# Patient Record
Sex: FEMALE | Race: WHITE
[De-identification: names, ages, dates, MRNs, and addresses within clinical notes are randomized per-mention and may not be internally consistent; named-entity substitution may affect disease eponyms.]

---

## 2017-10-20 ENCOUNTER — CHARTING TRANS (OUTPATIENT)
Dept: OTHER | Age: 47
End: 2017-10-20

## 2018-02-16 ENCOUNTER — LAB ENCOUNTER (OUTPATIENT)
Dept: LAB | Age: 48
End: 2018-02-16
Attending: INTERNAL MEDICINE
Payer: COMMERCIAL

## 2018-02-16 DIAGNOSIS — Z00.00 ROUTINE GENERAL MEDICAL EXAMINATION AT A HEALTH CARE FACILITY: Primary | ICD-10-CM

## 2018-02-16 LAB
25-HYDROXYVITAMIN D (TOTAL): 22.3 NG/ML (ref 30–100)
ALBUMIN SERPL-MCNC: 3.9 G/DL (ref 3.5–4.8)
ALP LIVER SERPL-CCNC: 76 U/L (ref 39–100)
ALT SERPL-CCNC: 27 U/L (ref 14–54)
AST SERPL-CCNC: 15 U/L (ref 15–41)
BASOPHILS # BLD AUTO: 0.03 X10(3) UL (ref 0–0.1)
BASOPHILS NFR BLD AUTO: 0.4 %
BILIRUB SERPL-MCNC: 0.3 MG/DL (ref 0.1–2)
BUN BLD-MCNC: 12 MG/DL (ref 8–20)
CALCIUM BLD-MCNC: 9.8 MG/DL (ref 8.3–10.3)
CHLORIDE: 106 MMOL/L (ref 101–111)
CHOLEST SMN-MCNC: 189 MG/DL (ref ?–200)
CO2: 28 MMOL/L (ref 22–32)
CREAT BLD-MCNC: 0.8 MG/DL (ref 0.55–1.02)
EOSINOPHIL # BLD AUTO: 0.18 X10(3) UL (ref 0–0.3)
EOSINOPHIL NFR BLD AUTO: 2.6 %
ERYTHROCYTE [DISTWIDTH] IN BLOOD BY AUTOMATED COUNT: 11.7 % (ref 11.5–16)
GLUCOSE BLD-MCNC: 73 MG/DL (ref 70–99)
HCT VFR BLD AUTO: 42.2 % (ref 34–50)
HDLC SERPL-MCNC: 46 MG/DL (ref 45–?)
HDLC SERPL: 4.11 {RATIO} (ref ?–4.44)
HGB BLD-MCNC: 13.6 G/DL (ref 12–16)
IMMATURE GRANULOCYTE COUNT: 0.01 X10(3) UL (ref 0–1)
IMMATURE GRANULOCYTE RATIO %: 0.1 %
LDLC SERPL CALC-MCNC: 107 MG/DL (ref ?–130)
LYMPHOCYTES # BLD AUTO: 2.24 X10(3) UL (ref 0.9–4)
LYMPHOCYTES NFR BLD AUTO: 32.7 %
M PROTEIN MFR SERPL ELPH: 8.4 G/DL (ref 6.1–8.3)
MCH RBC QN AUTO: 30 PG (ref 27–33.2)
MCHC RBC AUTO-ENTMCNC: 32.2 G/DL (ref 31–37)
MCV RBC AUTO: 93 FL (ref 81–100)
MONOCYTES # BLD AUTO: 0.63 X10(3) UL (ref 0.1–1)
MONOCYTES NFR BLD AUTO: 9.2 %
NEUTROPHIL ABS PRELIM: 3.76 X10 (3) UL (ref 1.3–6.7)
NEUTROPHILS # BLD AUTO: 3.76 X10(3) UL (ref 1.3–6.7)
NEUTROPHILS NFR BLD AUTO: 55 %
NONHDLC SERPL-MCNC: 143 MG/DL (ref ?–130)
PLATELET # BLD AUTO: 339 10(3)UL (ref 150–450)
POTASSIUM SERPL-SCNC: 5 MMOL/L (ref 3.6–5.1)
RBC # BLD AUTO: 4.54 X10(6)UL (ref 3.8–5.1)
RED CELL DISTRIBUTION WIDTH-SD: 39.9 FL (ref 35.1–46.3)
SODIUM SERPL-SCNC: 139 MMOL/L (ref 136–144)
TRIGL SERPL-MCNC: 178 MG/DL (ref ?–150)
TSI SER-ACNC: 0.65 MIU/ML (ref 0.35–5.5)
VLDLC SERPL CALC-MCNC: 36 MG/DL (ref 5–40)
WBC # BLD AUTO: 6.9 X10(3) UL (ref 4–13)

## 2018-02-16 PROCEDURE — 84443 ASSAY THYROID STIM HORMONE: CPT

## 2018-02-16 PROCEDURE — 82306 VITAMIN D 25 HYDROXY: CPT

## 2018-02-16 PROCEDURE — 80053 COMPREHEN METABOLIC PANEL: CPT

## 2018-02-16 PROCEDURE — 36415 COLL VENOUS BLD VENIPUNCTURE: CPT

## 2018-02-16 PROCEDURE — 85025 COMPLETE CBC W/AUTO DIFF WBC: CPT

## 2018-02-16 PROCEDURE — 80061 LIPID PANEL: CPT

## 2018-02-21 ENCOUNTER — HOSPITAL ENCOUNTER (OUTPATIENT)
Dept: MAMMOGRAPHY | Age: 48
Discharge: HOME | End: 2018-02-21
Attending: INTERNAL MEDICINE
Payer: COMMERCIAL

## 2018-02-21 DIAGNOSIS — R92.2 DENSE BREASTS: ICD-10-CM

## 2018-02-21 DIAGNOSIS — Z12.31 VISIT FOR SCREENING MAMMOGRAM: ICD-10-CM

## 2018-02-21 PROCEDURE — 77067 SCR MAMMO BI INCL CAD: CPT | Performed by: INTERNAL MEDICINE

## 2018-02-21 PROCEDURE — 77063 BREAST TOMOSYNTHESIS BI: CPT | Performed by: INTERNAL MEDICINE

## 2018-10-25 ENCOUNTER — CHARTING TRANS (OUTPATIENT)
Dept: OTHER | Age: 48
End: 2018-10-25

## 2019-02-22 ENCOUNTER — HOSPITAL ENCOUNTER (OUTPATIENT)
Dept: MAMMOGRAPHY | Age: 49
Discharge: HOME | End: 2019-02-22
Attending: INTERNAL MEDICINE
Payer: COMMERCIAL

## 2019-02-22 ENCOUNTER — LAB ENCOUNTER (OUTPATIENT)
Dept: LAB | Age: 49
End: 2019-02-22
Attending: INTERNAL MEDICINE
Payer: COMMERCIAL

## 2019-02-22 DIAGNOSIS — Z00.00 ROUTINE GENERAL MEDICAL EXAMINATION AT A HEALTH CARE FACILITY: Primary | ICD-10-CM

## 2019-02-22 DIAGNOSIS — Z12.39 ENCOUNTER FOR SCREENING FOR MALIGNANT NEOPLASM OF BREAST: ICD-10-CM

## 2019-02-22 LAB
ALBUMIN SERPL-MCNC: 3.5 G/DL (ref 3.4–5)
ALBUMIN/GLOB SERPL: 0.8 {RATIO} (ref 1–2)
ALP LIVER SERPL-CCNC: 72 U/L (ref 39–100)
ALT SERPL-CCNC: 42 U/L (ref 13–56)
ANION GAP SERPL CALC-SCNC: 11 MMOL/L (ref 0–18)
AST SERPL-CCNC: 33 U/L (ref 15–37)
BILIRUB SERPL-MCNC: 0.4 MG/DL (ref 0.1–2)
BUN BLD-MCNC: 12 MG/DL (ref 7–18)
BUN/CREAT SERPL: 16 (ref 10–20)
CALCIUM BLD-MCNC: 9.1 MG/DL (ref 8.5–10.1)
CHLORIDE SERPL-SCNC: 104 MMOL/L (ref 98–107)
CHOLEST SMN-MCNC: 214 MG/DL (ref ?–200)
CO2 SERPL-SCNC: 23 MMOL/L (ref 21–32)
CREAT BLD-MCNC: 0.75 MG/DL (ref 0.55–1.02)
GLOBULIN PLAS-MCNC: 4.2 G/DL (ref 2.8–4.4)
GLUCOSE BLD-MCNC: 79 MG/DL (ref 70–99)
HDLC SERPL-MCNC: 47 MG/DL (ref 40–59)
LDLC SERPL CALC-MCNC: 133 MG/DL (ref ?–100)
M PROTEIN MFR SERPL ELPH: 7.7 G/DL (ref 6.4–8.2)
NONHDLC SERPL-MCNC: 167 MG/DL (ref ?–130)
OSMOLALITY SERPL CALC.SUM OF ELEC: 285 MOSM/KG (ref 275–295)
POTASSIUM SERPL-SCNC: 4.7 MMOL/L (ref 3.5–5.1)
SODIUM SERPL-SCNC: 138 MMOL/L (ref 136–145)
TRIGL SERPL-MCNC: 172 MG/DL (ref 30–149)
TSI SER-ACNC: 0.55 MIU/ML (ref 0.36–3.74)
VIT D+METAB SERPL-MCNC: 34.9 NG/ML (ref 30–100)
VLDLC SERPL CALC-MCNC: 34 MG/DL (ref 0–30)

## 2019-02-22 PROCEDURE — 85025 COMPLETE CBC W/AUTO DIFF WBC: CPT

## 2019-02-22 PROCEDURE — 80053 COMPREHEN METABOLIC PANEL: CPT

## 2019-02-22 PROCEDURE — 77063 BREAST TOMOSYNTHESIS BI: CPT | Performed by: INTERNAL MEDICINE

## 2019-02-22 PROCEDURE — 84443 ASSAY THYROID STIM HORMONE: CPT

## 2019-02-22 PROCEDURE — 82306 VITAMIN D 25 HYDROXY: CPT

## 2019-02-22 PROCEDURE — 77067 SCR MAMMO BI INCL CAD: CPT | Performed by: INTERNAL MEDICINE

## 2019-02-22 PROCEDURE — 80061 LIPID PANEL: CPT

## 2019-03-01 ENCOUNTER — LAB ENCOUNTER (OUTPATIENT)
Dept: LAB | Age: 49
End: 2019-03-01
Attending: INTERNAL MEDICINE
Payer: COMMERCIAL

## 2019-03-01 DIAGNOSIS — Z00.00 ROUTINE GENERAL MEDICAL EXAMINATION AT A HEALTH CARE FACILITY: ICD-10-CM

## 2019-03-01 LAB
BASOPHILS # BLD AUTO: 0.02 X10(3) UL (ref 0–0.2)
BASOPHILS NFR BLD AUTO: 0.3 %
DEPRECATED RDW RBC AUTO: 40.6 FL (ref 35.1–46.3)
EOSINOPHIL # BLD AUTO: 0.17 X10(3) UL (ref 0–0.7)
EOSINOPHIL NFR BLD AUTO: 2.4 %
ERYTHROCYTE [DISTWIDTH] IN BLOOD BY AUTOMATED COUNT: 11.9 % (ref 11–15)
HCT VFR BLD AUTO: 40.7 % (ref 35–48)
HGB BLD-MCNC: 13.2 G/DL (ref 12–16)
IMM GRANULOCYTES # BLD AUTO: 0.03 X10(3) UL (ref 0–1)
IMM GRANULOCYTES NFR BLD: 0.4 %
LYMPHOCYTES # BLD AUTO: 1.72 X10(3) UL (ref 1–4)
LYMPHOCYTES NFR BLD AUTO: 24.1 %
MCH RBC QN AUTO: 30.3 PG (ref 26–34)
MCHC RBC AUTO-ENTMCNC: 32.4 G/DL (ref 31–37)
MCV RBC AUTO: 93.3 FL (ref 80–100)
MONOCYTES # BLD AUTO: 0.67 X10(3) UL (ref 0.1–1)
MONOCYTES NFR BLD AUTO: 9.4 %
NEUTROPHILS # BLD AUTO: 4.53 X10 (3) UL (ref 1.5–7.7)
NEUTROPHILS # BLD AUTO: 4.53 X10(3) UL (ref 1.5–7.7)
NEUTROPHILS NFR BLD AUTO: 63.4 %
PLATELET # BLD AUTO: 343 10(3)UL (ref 150–450)
RBC # BLD AUTO: 4.36 X10(6)UL (ref 3.8–5.3)
WBC # BLD AUTO: 7.1 X10(3) UL (ref 4–11)

## 2019-03-01 PROCEDURE — 36415 COLL VENOUS BLD VENIPUNCTURE: CPT

## 2019-03-01 PROCEDURE — 85025 COMPLETE CBC W/AUTO DIFF WBC: CPT

## 2019-10-18 ENCOUNTER — WALK IN (OUTPATIENT)
Dept: URGENT CARE | Age: 49
End: 2019-10-18

## 2019-10-18 DIAGNOSIS — Z23 NEED FOR IMMUNIZATION AGAINST INFLUENZA: Primary | ICD-10-CM

## 2019-10-18 PROCEDURE — 90686 IIV4 VACC NO PRSV 0.5 ML IM: CPT | Performed by: NURSE PRACTITIONER

## 2019-10-18 PROCEDURE — 90471 IMMUNIZATION ADMIN: CPT | Performed by: NURSE PRACTITIONER

## 2020-02-26 ENCOUNTER — HOSPITAL ENCOUNTER (OUTPATIENT)
Dept: MAMMOGRAPHY | Age: 50
Discharge: HOME | End: 2020-02-26
Payer: COMMERCIAL

## 2020-02-26 DIAGNOSIS — Z12.31 SCREENING MAMMOGRAM, ENCOUNTER FOR: ICD-10-CM

## 2020-02-26 DIAGNOSIS — Z12.39 SCREENING FOR BREAST CANCER: ICD-10-CM

## 2020-02-26 PROCEDURE — 77063 BREAST TOMOSYNTHESIS BI: CPT | Performed by: INTERNAL MEDICINE

## 2020-02-26 PROCEDURE — 77067 SCR MAMMO BI INCL CAD: CPT | Performed by: INTERNAL MEDICINE

## 2020-10-09 ENCOUNTER — IMMUNIZATION (OUTPATIENT)
Dept: URGENT CARE | Age: 50
End: 2020-10-09

## 2020-10-09 DIAGNOSIS — Z23 INFLUENZA VACCINE NEEDED: Primary | ICD-10-CM

## 2020-10-09 PROCEDURE — 90686 IIV4 VACC NO PRSV 0.5 ML IM: CPT | Performed by: OBSTETRICS & GYNECOLOGY

## 2020-10-09 PROCEDURE — 90471 IMMUNIZATION ADMIN: CPT | Performed by: OBSTETRICS & GYNECOLOGY

## 2021-02-01 ENCOUNTER — MED REC SCAN ONLY (OUTPATIENT)
Dept: INTERNAL MEDICINE CLINIC | Facility: CLINIC | Age: 51
End: 2021-02-01

## 2021-02-23 ENCOUNTER — OFFICE VISIT (OUTPATIENT)
Dept: INTERNAL MEDICINE CLINIC | Facility: CLINIC | Age: 51
End: 2021-02-23
Payer: COMMERCIAL

## 2021-02-23 VITALS
HEART RATE: 97 BPM | HEIGHT: 65.5 IN | WEIGHT: 199.38 LBS | RESPIRATION RATE: 16 BRPM | TEMPERATURE: 98 F | SYSTOLIC BLOOD PRESSURE: 140 MMHG | OXYGEN SATURATION: 99 % | DIASTOLIC BLOOD PRESSURE: 80 MMHG | BODY MASS INDEX: 32.82 KG/M2

## 2021-02-23 DIAGNOSIS — Z00.00 LABORATORY EXAM ORDERED AS PART OF ROUTINE GENERAL MEDICAL EXAMINATION: ICD-10-CM

## 2021-02-23 DIAGNOSIS — E55.9 HYPOVITAMINOSIS D: ICD-10-CM

## 2021-02-23 DIAGNOSIS — Z86.018 HISTORY OF UTERINE FIBROID: ICD-10-CM

## 2021-02-23 DIAGNOSIS — Z12.31 ENCOUNTER FOR SCREENING MAMMOGRAM FOR BREAST CANCER: ICD-10-CM

## 2021-02-23 DIAGNOSIS — E61.1 IRON DEFICIENCY: ICD-10-CM

## 2021-02-23 DIAGNOSIS — Z00.00 ENCOUNTER FOR ROUTINE ADULT MEDICAL EXAMINATION: Primary | ICD-10-CM

## 2021-02-23 PROCEDURE — 3079F DIAST BP 80-89 MM HG: CPT | Performed by: INTERNAL MEDICINE

## 2021-02-23 PROCEDURE — 99386 PREV VISIT NEW AGE 40-64: CPT | Performed by: INTERNAL MEDICINE

## 2021-02-23 PROCEDURE — 3008F BODY MASS INDEX DOCD: CPT | Performed by: INTERNAL MEDICINE

## 2021-02-23 PROCEDURE — 3077F SYST BP >= 140 MM HG: CPT | Performed by: INTERNAL MEDICINE

## 2021-02-23 RX ORDER — MULTIVIT-MIN/IRON/FOLIC ACID/K 18-600-40
1 CAPSULE ORAL DAILY
COMMUNITY

## 2021-02-23 RX ORDER — NYSTATIN 100000 [USP'U]/G
1 POWDER TOPICAL
COMMUNITY
Start: 2020-11-20

## 2021-02-23 RX ORDER — D-METHORPHAN/PE/ACETAMINOPHEN 10-5-325MG
1 CAPSULE ORAL 2 TIMES DAILY
COMMUNITY

## 2021-02-23 RX ORDER — PHENOL 1.4 %
1 AEROSOL, SPRAY (ML) MUCOUS MEMBRANE NIGHTLY
COMMUNITY

## 2021-02-23 NOTE — PATIENT INSTRUCTIONS
Call to schedule appointment for blood work and mammogram    Call to schedule appointment with gynecology

## 2021-02-23 NOTE — PROGRESS NOTES
[facility] Office Note  Chief Complaint:   Patient presents with:  New Patient  CPX      HPI:   This is a 53year old female coming in for establishing care  HPI    She notes she had a lot of issues around menopause.  She had irr • Nystatin 047065 UNIT/GM External Powder Apply 1 Application topically. • hydrocortisone 2.5 % External Cream Use on AA QD-BID prn 30 g 2   • ketoconazole 2 % External Cream Apply to AA QD-BID prn.  60 g 2   • Calcium Carbonate-Vit D-Min (CALCIUM 120 and affect.        ASSESSMENT AND PLAN:   Marlen Arroyo is a 53year old female with  Encounter for routine adult medical examination  (primary encounter diagnosis)  History of uterine fibroid  Laboratory exam ordered as part of routine general medical exam George Fulton MD

## 2021-03-02 ENCOUNTER — LAB ENCOUNTER (OUTPATIENT)
Dept: LAB | Age: 51
End: 2021-03-02
Attending: INTERNAL MEDICINE
Payer: COMMERCIAL

## 2021-03-02 LAB
ALBUMIN SERPL-MCNC: 3.7 G/DL (ref 3.4–5)
ALBUMIN/GLOB SERPL: 0.9 {RATIO} (ref 1–2)
ALP LIVER SERPL-CCNC: 90 U/L
ALT SERPL-CCNC: 31 U/L
ANION GAP SERPL CALC-SCNC: 5 MMOL/L (ref 0–18)
AST SERPL-CCNC: 12 U/L (ref 15–37)
BASOPHILS # BLD AUTO: 0.05 X10(3) UL (ref 0–0.2)
BASOPHILS NFR BLD AUTO: 0.6 %
BILIRUB SERPL-MCNC: 0.3 MG/DL (ref 0.1–2)
BUN BLD-MCNC: 11 MG/DL (ref 7–18)
BUN/CREAT SERPL: 14.9 (ref 10–20)
CALCIUM BLD-MCNC: 9 MG/DL (ref 8.5–10.1)
CHLORIDE SERPL-SCNC: 106 MMOL/L (ref 98–112)
CHOLEST SMN-MCNC: 234 MG/DL (ref ?–200)
CO2 SERPL-SCNC: 28 MMOL/L (ref 21–32)
CREAT BLD-MCNC: 0.74 MG/DL
DEPRECATED HBV CORE AB SER IA-ACNC: 31.2 NG/ML
DEPRECATED RDW RBC AUTO: 40.9 FL (ref 35.1–46.3)
EOSINOPHIL # BLD AUTO: 0.18 X10(3) UL (ref 0–0.7)
EOSINOPHIL NFR BLD AUTO: 2.3 %
ERYTHROCYTE [DISTWIDTH] IN BLOOD BY AUTOMATED COUNT: 12 % (ref 11–15)
GLOBULIN PLAS-MCNC: 4 G/DL (ref 2.8–4.4)
GLUCOSE BLD-MCNC: 85 MG/DL (ref 70–99)
HCT VFR BLD AUTO: 43.4 %
HDLC SERPL-MCNC: 48 MG/DL (ref 40–59)
HGB BLD-MCNC: 13.9 G/DL
IMM GRANULOCYTES # BLD AUTO: 0.02 X10(3) UL (ref 0–1)
IMM GRANULOCYTES NFR BLD: 0.3 %
IRON SATURATION: 17 %
IRON SERPL-MCNC: 80 UG/DL
LDLC SERPL CALC-MCNC: 139 MG/DL (ref ?–100)
LYMPHOCYTES # BLD AUTO: 1.95 X10(3) UL (ref 1–4)
LYMPHOCYTES NFR BLD AUTO: 24.5 %
M PROTEIN MFR SERPL ELPH: 7.7 G/DL (ref 6.4–8.2)
MCH RBC QN AUTO: 29.6 PG (ref 26–34)
MCHC RBC AUTO-ENTMCNC: 32 G/DL (ref 31–37)
MCV RBC AUTO: 92.5 FL
MONOCYTES # BLD AUTO: 0.87 X10(3) UL (ref 0.1–1)
MONOCYTES NFR BLD AUTO: 10.9 %
NEUTROPHILS # BLD AUTO: 4.89 X10 (3) UL (ref 1.5–7.7)
NEUTROPHILS # BLD AUTO: 4.89 X10(3) UL (ref 1.5–7.7)
NEUTROPHILS NFR BLD AUTO: 61.4 %
NONHDLC SERPL-MCNC: 186 MG/DL (ref ?–130)
OSMOLALITY SERPL CALC.SUM OF ELEC: 287 MOSM/KG (ref 275–295)
PATIENT FASTING Y/N/NP: YES
PATIENT FASTING Y/N/NP: YES
PLATELET # BLD AUTO: 346 10(3)UL (ref 150–450)
POTASSIUM SERPL-SCNC: 4.6 MMOL/L (ref 3.5–5.1)
RBC # BLD AUTO: 4.69 X10(6)UL
SODIUM SERPL-SCNC: 139 MMOL/L (ref 136–145)
TOTAL IRON BINDING CAPACITY: 475 UG/DL (ref 240–450)
TRANSFERRIN SERPL-MCNC: 319 MG/DL (ref 200–360)
TRIGL SERPL-MCNC: 236 MG/DL (ref 30–149)
TSI SER-ACNC: 0.87 MIU/ML (ref 0.36–3.74)
VIT D+METAB SERPL-MCNC: 37.7 NG/ML (ref 30–100)
VLDLC SERPL CALC-MCNC: 47 MG/DL (ref 0–30)
WBC # BLD AUTO: 8 X10(3) UL (ref 4–11)

## 2021-03-02 PROCEDURE — 83540 ASSAY OF IRON: CPT | Performed by: INTERNAL MEDICINE

## 2021-03-02 PROCEDURE — 84443 ASSAY THYROID STIM HORMONE: CPT | Performed by: INTERNAL MEDICINE

## 2021-03-02 PROCEDURE — 36415 COLL VENOUS BLD VENIPUNCTURE: CPT | Performed by: INTERNAL MEDICINE

## 2021-03-02 PROCEDURE — 83550 IRON BINDING TEST: CPT | Performed by: INTERNAL MEDICINE

## 2021-03-02 PROCEDURE — 82728 ASSAY OF FERRITIN: CPT | Performed by: INTERNAL MEDICINE

## 2021-03-02 PROCEDURE — 80061 LIPID PANEL: CPT | Performed by: INTERNAL MEDICINE

## 2021-03-02 PROCEDURE — 80053 COMPREHEN METABOLIC PANEL: CPT | Performed by: INTERNAL MEDICINE

## 2021-03-02 PROCEDURE — 85025 COMPLETE CBC W/AUTO DIFF WBC: CPT | Performed by: INTERNAL MEDICINE

## 2021-03-02 PROCEDURE — 82306 VITAMIN D 25 HYDROXY: CPT | Performed by: INTERNAL MEDICINE

## 2021-03-03 ENCOUNTER — HOSPITAL ENCOUNTER (OUTPATIENT)
Dept: MAMMOGRAPHY | Age: 51
Discharge: HOME | End: 2021-03-03
Attending: INTERNAL MEDICINE
Payer: COMMERCIAL

## 2021-03-03 DIAGNOSIS — Z12.31 ENCOUNTER FOR SCREENING MAMMOGRAM FOR BREAST CANCER: ICD-10-CM

## 2021-03-03 PROCEDURE — 77063 BREAST TOMOSYNTHESIS BI: CPT | Performed by: INTERNAL MEDICINE

## 2021-03-03 PROCEDURE — 77067 SCR MAMMO BI INCL CAD: CPT | Performed by: INTERNAL MEDICINE

## 2021-03-10 ENCOUNTER — TELEPHONE (OUTPATIENT)
Dept: INTERNAL MEDICINE CLINIC | Facility: CLINIC | Age: 51
End: 2021-03-10

## 2021-03-10 NOTE — TELEPHONE ENCOUNTER
Pt thinks she may have pulled her neck muscle about 7 days ago. C/o pain back right side of neck, head sensitivity, sore jaw muscle after eating and throat tightness when swallowing. Difficulty sleeping at night.   Nasal congestion started about 4-5 days

## 2021-03-10 NOTE — TELEPHONE ENCOUNTER
Patient called and stated that she has been experiencing neck pain since last Wednesday. She stated that it hurts to move her neck and swallow. She has been using ice packs and it's not helping. She would like to speak with a nurse. Please advise.

## 2021-03-10 NOTE — TELEPHONE ENCOUNTER
Future Appointments   Date Time Provider Hero Joseph   3/11/2021  1:30 PM Megan Reyes MD [facility] [place]   4/2/2021 11:45 AM Ayesha Wilson MD [facility] OB[facility] P [facility] [address]

## 2021-03-11 ENCOUNTER — TELEMEDICINE (OUTPATIENT)
Dept: INTERNAL MEDICINE CLINIC | Facility: CLINIC | Age: 51
End: 2021-03-11

## 2021-03-11 DIAGNOSIS — B34.9 VIRAL SYNDROME: ICD-10-CM

## 2021-03-11 DIAGNOSIS — M54.2 NECK PAIN: Primary | ICD-10-CM

## 2021-03-11 DIAGNOSIS — M62.838 MUSCLE SPASM: ICD-10-CM

## 2021-03-11 PROCEDURE — 99213 OFFICE O/P EST LOW 20 MIN: CPT | Performed by: INTERNAL MEDICINE

## 2021-03-11 RX ORDER — CYCLOBENZAPRINE HCL 5 MG
5 TABLET ORAL NIGHTLY
Qty: 20 TABLET | Refills: 0 | Status: SHIPPED | OUTPATIENT
Start: 2021-03-11 | End: 2021-08-03

## 2021-03-11 NOTE — PATIENT INSTRUCTIONS
Naproxen 1-2 tablets with breakfast and with dinner.      Cyclobenzaprine 5mg in evening as needed for muscle spasm

## 2021-03-11 NOTE — PROGRESS NOTES
This is a telemedicine visit with live, interactive video and audio. Patient understands and accepts financial responsibility for any deductible, co-insurance and/or co-pays associated with this service.     SUBJECTIVE  She started having a sore neck a D 800 IU          OBJECTIVE  Physical Exam:   No acute distress. Range of motion of neck is limited to the right.  She points out soreness in the right     ASSESSMENT & PLAN  Diagnoses and all orders for this visit:    Neck pain and  Muscle spasm - Naproxen

## 2021-04-08 ENCOUNTER — IMMUNIZATION (OUTPATIENT)
Dept: LAB | Age: 51
End: 2021-04-08
Attending: HOSPITALIST
Payer: COMMERCIAL

## 2021-04-08 DIAGNOSIS — Z23 NEED FOR VACCINATION: Primary | ICD-10-CM

## 2021-04-08 PROCEDURE — 0001A SARSCOV2 VAC 30MCG/0.3ML IM: CPT

## 2021-04-29 ENCOUNTER — IMMUNIZATION (OUTPATIENT)
Dept: LAB | Age: 51
End: 2021-04-29
Attending: HOSPITALIST
Payer: COMMERCIAL

## 2021-04-29 DIAGNOSIS — Z23 NEED FOR VACCINATION: Primary | ICD-10-CM

## 2021-04-29 PROCEDURE — 0002A SARSCOV2 VAC 30MCG/0.3ML IM: CPT

## 2021-08-03 ENCOUNTER — OFFICE VISIT (OUTPATIENT)
Dept: OBGYN CLINIC | Facility: CLINIC | Age: 51
End: 2021-08-03
Payer: COMMERCIAL

## 2021-08-03 VITALS
WEIGHT: 200.38 LBS | DIASTOLIC BLOOD PRESSURE: 76 MMHG | HEIGHT: 65.5 IN | SYSTOLIC BLOOD PRESSURE: 122 MMHG | HEART RATE: 80 BPM | BODY MASS INDEX: 32.98 KG/M2

## 2021-08-03 DIAGNOSIS — B37.2 CANDIDAL INTERTRIGO: ICD-10-CM

## 2021-08-03 DIAGNOSIS — D25.1 INTRAMURAL LEIOMYOMA OF UTERUS: ICD-10-CM

## 2021-08-03 DIAGNOSIS — B37.9 YEAST INFECTION: ICD-10-CM

## 2021-08-03 DIAGNOSIS — N95.1 HOT FLUSHES, PERIMENOPAUSAL: Primary | ICD-10-CM

## 2021-08-03 PROCEDURE — 3074F SYST BP LT 130 MM HG: CPT | Performed by: OBSTETRICS & GYNECOLOGY

## 2021-08-03 PROCEDURE — 3078F DIAST BP <80 MM HG: CPT | Performed by: OBSTETRICS & GYNECOLOGY

## 2021-08-03 PROCEDURE — 3008F BODY MASS INDEX DOCD: CPT | Performed by: OBSTETRICS & GYNECOLOGY

## 2021-08-03 PROCEDURE — 99204 OFFICE O/P NEW MOD 45 MIN: CPT | Performed by: OBSTETRICS & GYNECOLOGY

## 2021-08-03 RX ORDER — NYSTATIN 100000 [USP'U]/G
1 POWDER TOPICAL 3 TIMES DAILY
Qty: 30 G | Refills: 3 | Status: SHIPPED | OUTPATIENT
Start: 2021-08-03

## 2021-08-03 RX ORDER — FLUCONAZOLE 150 MG/1
TABLET ORAL
Qty: 2 TABLET | Refills: 0 | Status: SHIPPED | OUTPATIENT
Start: 2021-08-03

## 2021-08-03 NOTE — PATIENT INSTRUCTIONS
You could try the bonafide Relizen for hot flashes to see if it helps your symptoms  Please notify our office with any prolonged menses > 10 days, very heavy soaking pad per hour, or bleeding in between periods, or severe abdominal pain

## 2021-08-03 NOTE — PROGRESS NOTES
GYN H&P     8/3/2021  11:59 AM    CC: Patient is here for menopausal symptoms, fibroids, vaginal discomfort    HPI: patient is a 53year old  here for her menopausal symptoms, fibroids vaginal discomfort    Pt had AUB 10/2019 with prolonged bleeding 2   SAB TAB Ectopic Multiple Live Births   0 0 0 0 2      # Outcome Date GA Lbr Joshua/2nd Weight Sex Delivery Anes PTL Lv   2 Term 01/12/02 40w0d   F NORMAL SPONT   ESPERANZA   1 Term 10/31/99 40w0d   M NORMAL SPONT   ESPERANZA       GYN hx:    Hx Prior Abnormal Pap: No weight. Abdominal:      Palpations: Abdomen is soft. Tenderness: There is no abdominal tenderness. Genitourinary:     Labia:         Right: Rash present. Left: Rash present.            Comments: Between labia majora and minora there is a wh Suspected yeast infection: also intertrigo noted on inner thighs. Ok to use nystatin powder PRN. For yeast in vulva, recommend diflucan x 2. Rx sent.     Her MMG is UTD 3/2021    F/u in November for repeat US and annual exam    Amber Vargas MD

## 2021-08-05 PROBLEM — D25.1 INTRAMURAL LEIOMYOMA OF UTERUS: Status: ACTIVE | Noted: 2021-08-05

## 2021-08-05 PROBLEM — B37.9 YEAST INFECTION: Status: ACTIVE | Noted: 2021-08-05

## 2021-08-05 PROBLEM — E66.811 CLASS 1 OBESITY DUE TO EXCESS CALORIES WITHOUT SERIOUS COMORBIDITY WITH BODY MASS INDEX (BMI) OF 30.0 TO 30.9 IN ADULT: Status: ACTIVE | Noted: 2019-02-20

## 2021-08-05 PROBLEM — N95.1 HOT FLUSHES, PERIMENOPAUSAL: Status: ACTIVE | Noted: 2019-02-20

## 2021-08-05 PROBLEM — B37.2 CANDIDAL INTERTRIGO: Status: ACTIVE | Noted: 2019-02-20

## 2021-08-05 PROBLEM — E66.09 CLASS 1 OBESITY DUE TO EXCESS CALORIES WITHOUT SERIOUS COMORBIDITY WITH BODY MASS INDEX (BMI) OF 30.0 TO 30.9 IN ADULT: Status: ACTIVE | Noted: 2019-02-20

## 2021-10-12 ENCOUNTER — IMMUNIZATION (OUTPATIENT)
Dept: INTERNAL MEDICINE CLINIC | Facility: CLINIC | Age: 51
End: 2021-10-12
Payer: COMMERCIAL

## 2021-10-12 DIAGNOSIS — Z23 NEED FOR VACCINATION: Primary | ICD-10-CM

## 2021-10-12 PROCEDURE — 90471 IMMUNIZATION ADMIN: CPT | Performed by: INTERNAL MEDICINE

## 2021-10-12 PROCEDURE — 90686 IIV4 VACC NO PRSV 0.5 ML IM: CPT | Performed by: INTERNAL MEDICINE

## 2021-11-19 ENCOUNTER — OFFICE VISIT (OUTPATIENT)
Dept: OBGYN CLINIC | Facility: CLINIC | Age: 51
End: 2021-11-19
Payer: COMMERCIAL

## 2021-11-19 ENCOUNTER — ULTRASOUND ENCOUNTER (OUTPATIENT)
Dept: OBGYN CLINIC | Facility: CLINIC | Age: 51
End: 2021-11-19
Payer: COMMERCIAL

## 2021-11-19 VITALS
BODY MASS INDEX: 32.1 KG/M2 | SYSTOLIC BLOOD PRESSURE: 156 MMHG | WEIGHT: 195 LBS | HEIGHT: 65.5 IN | HEART RATE: 76 BPM | DIASTOLIC BLOOD PRESSURE: 100 MMHG

## 2021-11-19 DIAGNOSIS — Z12.39 ENCOUNTER FOR SCREENING FOR MALIGNANT NEOPLASM OF BREAST, UNSPECIFIED SCREENING MODALITY: ICD-10-CM

## 2021-11-19 DIAGNOSIS — Z01.419 WELL WOMAN EXAM WITH ROUTINE GYNECOLOGICAL EXAM: Primary | ICD-10-CM

## 2021-11-19 DIAGNOSIS — Z12.4 SCREENING FOR MALIGNANT NEOPLASM OF CERVIX: ICD-10-CM

## 2021-11-19 DIAGNOSIS — N81.11 CYSTOCELE, MIDLINE: ICD-10-CM

## 2021-11-19 DIAGNOSIS — N95.1 HOT FLUSHES, PERIMENOPAUSAL: ICD-10-CM

## 2021-11-19 DIAGNOSIS — D25.9 UTERINE LEIOMYOMA, UNSPECIFIED LOCATION: ICD-10-CM

## 2021-11-19 PROBLEM — B37.9 YEAST INFECTION: Status: RESOLVED | Noted: 2021-08-05 | Resolved: 2021-11-19

## 2021-11-19 PROCEDURE — 99396 PREV VISIT EST AGE 40-64: CPT | Performed by: OBSTETRICS & GYNECOLOGY

## 2021-11-19 PROCEDURE — 87624 HPV HI-RISK TYP POOLED RSLT: CPT | Performed by: OBSTETRICS & GYNECOLOGY

## 2021-11-19 PROCEDURE — 3077F SYST BP >= 140 MM HG: CPT | Performed by: OBSTETRICS & GYNECOLOGY

## 2021-11-19 PROCEDURE — 88175 CYTOPATH C/V AUTO FLUID REDO: CPT | Performed by: OBSTETRICS & GYNECOLOGY

## 2021-11-19 PROCEDURE — 76856 US EXAM PELVIC COMPLETE: CPT | Performed by: OBSTETRICS & GYNECOLOGY

## 2021-11-19 PROCEDURE — 3080F DIAST BP >= 90 MM HG: CPT | Performed by: OBSTETRICS & GYNECOLOGY

## 2021-11-19 PROCEDURE — 3008F BODY MASS INDEX DOCD: CPT | Performed by: OBSTETRICS & GYNECOLOGY

## 2021-11-19 PROCEDURE — 76830 TRANSVAGINAL US NON-OB: CPT | Performed by: OBSTETRICS & GYNECOLOGY

## 2021-11-19 NOTE — PROGRESS NOTES
GYN H&P     2021  11:52 AM    CC: Patient is here for annual exam, US review    HPI: patient is a 49year old  here for her annual exam and US review  Since her last visit she has had overall regular menses which have been short and overall li noted on impression)  Intramural fundal: 2.6x2. 4x2.0 cm  Left uterine body: 1.7x1. 7x1.5 cm  Posterior uterine body: 1.8x1. 6x1.5 cm     Also reports vaginal irritation and not feeling as fresh and normal. Unsure if she has an infection or if this is menopau medications on file prior to visit. Family History   Problem Relation Age of Onset   • Cancer Father 76        psa and lungs   • Hypertension Mother              Review of Systems   Constitutional: Positive for unexpected weight change.  Negative for act Speech: Speech normal.        Ivory Gains presents for pelvic US for fibroid surveillance  Uterus: 9x6x4 cm  Endometrium is 7 mm (having menses)  There is one visualized anterior subserosal fibroid noted measuring 1.6x1.7 cm, which when compared to outs

## 2022-02-14 RX ORDER — NYSTATIN 100000 [USP'U]/G
POWDER TOPICAL
Qty: 30 G | Refills: 3 | Status: SHIPPED | OUTPATIENT
Start: 2022-02-14 | End: 2022-02-25

## 2022-02-14 NOTE — TELEPHONE ENCOUNTER
Last OV: 11/19/21  Last refill date: 8/3/21  Follow-up: one year  Next appt.: not scheduled    Called pt and left message on VM. Did pt need this medication? Is pt having symptoms?

## 2022-02-14 NOTE — TELEPHONE ENCOUNTER
Patient states that she takes this daily per Dr Radha Seth. She uses this to prevent.  Please refill

## 2022-02-25 ENCOUNTER — OFFICE VISIT (OUTPATIENT)
Dept: INTERNAL MEDICINE CLINIC | Facility: CLINIC | Age: 52
End: 2022-02-25
Payer: COMMERCIAL

## 2022-02-25 VITALS
HEIGHT: 65 IN | TEMPERATURE: 98 F | OXYGEN SATURATION: 99 % | WEIGHT: 202.38 LBS | HEART RATE: 92 BPM | SYSTOLIC BLOOD PRESSURE: 120 MMHG | RESPIRATION RATE: 16 BRPM | BODY MASS INDEX: 33.72 KG/M2 | DIASTOLIC BLOOD PRESSURE: 70 MMHG

## 2022-02-25 DIAGNOSIS — E66.09 CLASS 1 OBESITY DUE TO EXCESS CALORIES WITH SERIOUS COMORBIDITY AND BODY MASS INDEX (BMI) OF 33.0 TO 33.9 IN ADULT: ICD-10-CM

## 2022-02-25 DIAGNOSIS — Z00.00 ROUTINE GENERAL MEDICAL EXAMINATION AT A HEALTH CARE FACILITY: Primary | ICD-10-CM

## 2022-02-25 PROCEDURE — 90715 TDAP VACCINE 7 YRS/> IM: CPT | Performed by: INTERNAL MEDICINE

## 2022-02-25 PROCEDURE — 90750 HZV VACC RECOMBINANT IM: CPT | Performed by: INTERNAL MEDICINE

## 2022-02-25 PROCEDURE — 3008F BODY MASS INDEX DOCD: CPT | Performed by: INTERNAL MEDICINE

## 2022-02-25 PROCEDURE — 99396 PREV VISIT EST AGE 40-64: CPT | Performed by: INTERNAL MEDICINE

## 2022-02-25 PROCEDURE — 3078F DIAST BP <80 MM HG: CPT | Performed by: INTERNAL MEDICINE

## 2022-02-25 PROCEDURE — 3074F SYST BP LT 130 MM HG: CPT | Performed by: INTERNAL MEDICINE

## 2022-02-25 PROCEDURE — 90472 IMMUNIZATION ADMIN EACH ADD: CPT | Performed by: INTERNAL MEDICINE

## 2022-02-25 PROCEDURE — 90471 IMMUNIZATION ADMIN: CPT | Performed by: INTERNAL MEDICINE

## 2022-02-25 NOTE — PATIENT INSTRUCTIONS
You need 3 eight ounce servings of calcium/vitamin D daily. This includes spinach, kale, broccoli, almonds, milk, yogurt, or cottage cheese. Please complete fasting labs as soon as possible.

## 2022-03-03 ENCOUNTER — LAB ENCOUNTER (OUTPATIENT)
Dept: LAB | Age: 52
End: 2022-03-03
Payer: COMMERCIAL

## 2022-03-03 DIAGNOSIS — Z00.00 ROUTINE GENERAL MEDICAL EXAMINATION AT A HEALTH CARE FACILITY: ICD-10-CM

## 2022-03-03 LAB
ALT SERPL-CCNC: 80 U/L
ANION GAP SERPL CALC-SCNC: 6 MMOL/L (ref 0–18)
AST SERPL-CCNC: 25 U/L (ref 15–37)
BASOPHILS # BLD AUTO: 0.06 X10(3) UL (ref 0–0.2)
BASOPHILS NFR BLD AUTO: 0.7 %
BUN BLD-MCNC: 10 MG/DL (ref 7–18)
CALCIUM BLD-MCNC: 9.4 MG/DL (ref 8.5–10.1)
CHLORIDE SERPL-SCNC: 104 MMOL/L (ref 98–112)
CHOLEST SERPL-MCNC: 237 MG/DL (ref ?–200)
CO2 SERPL-SCNC: 28 MMOL/L (ref 21–32)
CREAT BLD-MCNC: 0.7 MG/DL
EOSINOPHIL # BLD AUTO: 0.2 X10(3) UL (ref 0–0.7)
EOSINOPHIL NFR BLD AUTO: 2.4 %
ERYTHROCYTE [DISTWIDTH] IN BLOOD BY AUTOMATED COUNT: 12.2 %
FASTING PATIENT LIPID ANSWER: YES
GLUCOSE BLD-MCNC: 83 MG/DL (ref 70–99)
HCT VFR BLD AUTO: 42.3 %
HCV AB SERPL QL IA: NONREACTIVE
HDLC SERPL-MCNC: 41 MG/DL (ref 40–59)
HGB BLD-MCNC: 13.5 G/DL
IMM GRANULOCYTES # BLD AUTO: 0.03 X10(3) UL (ref 0–1)
IMM GRANULOCYTES NFR BLD: 0.4 %
LDLC SERPL CALC-MCNC: 148 MG/DL (ref ?–100)
LYMPHOCYTES # BLD AUTO: 2.16 X10(3) UL (ref 1–4)
MCH RBC QN AUTO: 29.3 PG (ref 26–34)
MCHC RBC AUTO-ENTMCNC: 31.9 G/DL (ref 31–37)
MCV RBC AUTO: 92 FL
MONOCYTES # BLD AUTO: 0.72 X10(3) UL (ref 0.1–1)
MONOCYTES NFR BLD AUTO: 8.7 %
NEUTROPHILS # BLD AUTO: 5.11 X10 (3) UL (ref 1.5–7.7)
NEUTROPHILS # BLD AUTO: 5.11 X10(3) UL (ref 1.5–7.7)
NEUTROPHILS NFR BLD AUTO: 61.7 %
NONHDLC SERPL-MCNC: 196 MG/DL (ref ?–130)
OSMOLALITY SERPL CALC.SUM OF ELEC: 284 MOSM/KG (ref 275–295)
PLATELET # BLD AUTO: 347 10(3)UL (ref 150–450)
POTASSIUM SERPL-SCNC: 5.1 MMOL/L (ref 3.5–5.1)
RBC # BLD AUTO: 4.6 X10(6)UL
SODIUM SERPL-SCNC: 138 MMOL/L (ref 136–145)
T4 FREE SERPL-MCNC: 1 NG/DL (ref 0.8–1.7)
TRIGL SERPL-MCNC: 260 MG/DL (ref 30–149)
TSI SER-ACNC: 0.7 MIU/ML (ref 0.36–3.74)
VLDLC SERPL CALC-MCNC: 50 MG/DL (ref 0–30)
WBC # BLD AUTO: 8.3 X10(3) UL (ref 4–11)

## 2022-03-03 PROCEDURE — 36415 COLL VENOUS BLD VENIPUNCTURE: CPT

## 2022-03-03 PROCEDURE — 84443 ASSAY THYROID STIM HORMONE: CPT

## 2022-03-03 PROCEDURE — 84460 ALANINE AMINO (ALT) (SGPT): CPT

## 2022-03-03 PROCEDURE — 84439 ASSAY OF FREE THYROXINE: CPT

## 2022-03-03 PROCEDURE — 80048 BASIC METABOLIC PNL TOTAL CA: CPT

## 2022-03-03 PROCEDURE — 85025 COMPLETE CBC W/AUTO DIFF WBC: CPT

## 2022-03-03 PROCEDURE — 84450 TRANSFERASE (AST) (SGOT): CPT

## 2022-03-03 PROCEDURE — 86803 HEPATITIS C AB TEST: CPT

## 2022-03-03 PROCEDURE — 80061 LIPID PANEL: CPT

## 2022-03-04 ENCOUNTER — HOSPITAL ENCOUNTER (OUTPATIENT)
Dept: MAMMOGRAPHY | Age: 52
Discharge: HOME | End: 2022-03-04
Attending: OBSTETRICS & GYNECOLOGY
Payer: COMMERCIAL

## 2022-03-04 DIAGNOSIS — Z12.39 ENCOUNTER FOR SCREENING FOR MALIGNANT NEOPLASM OF BREAST, UNSPECIFIED SCREENING MODALITY: ICD-10-CM

## 2022-03-04 PROCEDURE — 77067 SCR MAMMO BI INCL CAD: CPT | Performed by: OBSTETRICS & GYNECOLOGY

## 2022-03-04 PROCEDURE — 77063 BREAST TOMOSYNTHESIS BI: CPT | Performed by: OBSTETRICS & GYNECOLOGY

## 2022-03-17 ENCOUNTER — TELEPHONE (OUTPATIENT)
Dept: OBGYN CLINIC | Facility: CLINIC | Age: 52
End: 2022-03-17

## 2022-03-17 NOTE — TELEPHONE ENCOUNTER
Patient is calling with complaint of 13 days of bleeding for her cycle this month  Some days heavy some light.

## 2022-03-17 NOTE — TELEPHONE ENCOUNTER
Gyne complaint- irregular bleeding  This is her 3rd time having irregular bleeding since 2019. Tried BC pills in he past- didn't like the side effects so she stop taking them  Tired Progesterone in the past for 21 days- had heavy and more painful periods  Last period was in November 12th  Period started March 5th   Patient knows to try to take Ibuprofen to see if that can help with her bleeding. She was notified that this is not uncommon for her to go 3 months with no period and then have irregular bleeding especially if she is perimenopause. Bleeding precautions given to patient  Dr. Law Sebastian any other advice for patient? She knows we will only call her if Dr. Law Sebastian has any additional advice for her.

## 2022-03-18 NOTE — TELEPHONE ENCOUNTER
Bleeding is lighter today  Today is day 13 th for bleeding  She knows to call the office if she is still bleeding by March 23rd to schedule an appointment    PSRs, please schedule patient in an acute slot for the week of March 28th if she calls stating she needs an appointment and there are no appts.

## 2022-03-18 NOTE — TELEPHONE ENCOUNTER
Per Dr. Purvis Favorite To clarify, is she still bleeding currently? If menses last > 2 weeks she should come in to discuss. Otherwise if skipping months I am ok with that in the perimenopausal transition     Yes patient stated she started bleeding March 5th. It slowed down March 16th but started back yesterday.       Left message on voicemail for patient to return phone call

## 2022-03-30 ENCOUNTER — OFFICE VISIT (OUTPATIENT)
Dept: OBGYN CLINIC | Facility: CLINIC | Age: 52
End: 2022-03-30
Payer: COMMERCIAL

## 2022-03-30 VITALS
BODY MASS INDEX: 33.22 KG/M2 | WEIGHT: 201.81 LBS | SYSTOLIC BLOOD PRESSURE: 128 MMHG | HEART RATE: 85 BPM | HEIGHT: 65.5 IN | DIASTOLIC BLOOD PRESSURE: 82 MMHG

## 2022-03-30 DIAGNOSIS — N92.4 EXCESSIVE BLEEDING IN PREMENOPAUSAL PERIOD: Primary | ICD-10-CM

## 2022-03-30 PROCEDURE — 3079F DIAST BP 80-89 MM HG: CPT | Performed by: OBSTETRICS & GYNECOLOGY

## 2022-03-30 PROCEDURE — 3008F BODY MASS INDEX DOCD: CPT | Performed by: OBSTETRICS & GYNECOLOGY

## 2022-03-30 PROCEDURE — 3074F SYST BP LT 130 MM HG: CPT | Performed by: OBSTETRICS & GYNECOLOGY

## 2022-03-30 PROCEDURE — 99213 OFFICE O/P EST LOW 20 MIN: CPT | Performed by: OBSTETRICS & GYNECOLOGY

## 2022-03-30 RX ORDER — MEDROXYPROGESTERONE ACETATE 5 MG/1
5 TABLET ORAL DAILY
Qty: 5 TABLET | Refills: 0 | Status: SHIPPED | OUTPATIENT
Start: 2022-03-30

## 2022-03-31 ENCOUNTER — LAB ENCOUNTER (OUTPATIENT)
Dept: LAB | Age: 52
End: 2022-03-31
Attending: OBSTETRICS & GYNECOLOGY
Payer: COMMERCIAL

## 2022-03-31 DIAGNOSIS — N92.4 EXCESSIVE BLEEDING IN PREMENOPAUSAL PERIOD: ICD-10-CM

## 2022-03-31 LAB
BASOPHILS # BLD AUTO: 0.06 X10(3) UL (ref 0–0.2)
BASOPHILS NFR BLD AUTO: 0.8 %
DEPRECATED HBV CORE AB SER IA-ACNC: 25.5 NG/ML
EOSINOPHIL # BLD AUTO: 0.24 X10(3) UL (ref 0–0.7)
EOSINOPHIL NFR BLD AUTO: 3.4 %
ERYTHROCYTE [DISTWIDTH] IN BLOOD BY AUTOMATED COUNT: 12.4 %
ESTRADIOL SERPL-MCNC: 38.1 PG/ML
FSH SERPL-ACNC: 33.3 MIU/ML
HCT VFR BLD AUTO: 39 %
HGB BLD-MCNC: 12.1 G/DL
IMM GRANULOCYTES # BLD AUTO: 0.04 X10(3) UL (ref 0–1)
IMM GRANULOCYTES NFR BLD: 0.6 %
LYMPHOCYTES # BLD AUTO: 2.16 X10(3) UL (ref 1–4)
LYMPHOCYTES NFR BLD AUTO: 30.4 %
MCH RBC QN AUTO: 29.3 PG (ref 26–34)
MCHC RBC AUTO-ENTMCNC: 31 G/DL (ref 31–37)
MCV RBC AUTO: 94.4 FL
MONOCYTES # BLD AUTO: 0.76 X10(3) UL (ref 0.1–1)
MONOCYTES NFR BLD AUTO: 10.7 %
NEUTROPHILS # BLD AUTO: 3.84 X10 (3) UL (ref 1.5–7.7)
NEUTROPHILS # BLD AUTO: 3.84 X10(3) UL (ref 1.5–7.7)
NEUTROPHILS NFR BLD AUTO: 54.1 %
PLATELET # BLD AUTO: 377 10(3)UL (ref 150–450)
RBC # BLD AUTO: 4.13 X10(6)UL
WBC # BLD AUTO: 7.1 X10(3) UL (ref 4–11)

## 2022-03-31 PROCEDURE — 36415 COLL VENOUS BLD VENIPUNCTURE: CPT

## 2022-03-31 PROCEDURE — 85025 COMPLETE CBC W/AUTO DIFF WBC: CPT

## 2022-03-31 PROCEDURE — 82728 ASSAY OF FERRITIN: CPT

## 2022-03-31 PROCEDURE — 82670 ASSAY OF TOTAL ESTRADIOL: CPT

## 2022-03-31 PROCEDURE — 83001 ASSAY OF GONADOTROPIN (FSH): CPT

## 2022-04-12 ENCOUNTER — TELEPHONE (OUTPATIENT)
Dept: OBGYN CLINIC | Facility: CLINIC | Age: 52
End: 2022-04-12

## 2022-04-12 NOTE — TELEPHONE ENCOUNTER
Surgery scheduled for 4/21/22 at [address] op   Future Appointments   Date Time Provider Hero Opal   4/26/2022  3:00 PM [facility] 08 NURSE [facility] [place]   5/5/2022  2:45 PM Junior Davis MD [facility] P [facility] [address]     Orders entered  Added to calendar  PA - no auth needed  REF # [id number] Applied

## 2022-04-12 NOTE — TELEPHONE ENCOUNTER
Regarding: FW: Test results and bleeding issue  Next Thursday sounds great! Claudean Au see below. I will also respond back to the patient    Please schedule for surgery:    Date: as per above    Surgeon: Lanie Miles    Assistant: none    Type of admit: outpatient    Preop dx: dysfunctional uterine bleeding    Procedure:   1. Dilation and curettage  2.  Hysteroscopic endometrial sampling with truclear    Anesthesia type: choice    Special equipment/implants: Truclear    Preop orders: Initiate my preop orders, if none exist please use [facility]'s Standing Preop Orders    Labs: CBC, urine HCG

## 2022-04-18 RX ORDER — MULTIVIT-MIN/IRON FUM/FOLIC AC 7.5 MG-4
1 TABLET ORAL DAILY
COMMUNITY

## 2022-04-19 ENCOUNTER — LAB ENCOUNTER (OUTPATIENT)
Dept: LAB | Age: 52
End: 2022-04-19
Attending: OBSTETRICS & GYNECOLOGY
Payer: COMMERCIAL

## 2022-04-19 DIAGNOSIS — Z01.818 PREOP TESTING: ICD-10-CM

## 2022-04-20 LAB — SARS-COV-2 RNA RESP QL NAA+PROBE: NOT DETECTED

## 2022-04-21 ENCOUNTER — ANESTHESIA EVENT (OUTPATIENT)
Dept: SURGERY | Facility: HOSPITAL | Age: 52
End: 2022-04-21
Payer: COMMERCIAL

## 2022-04-21 ENCOUNTER — ANESTHESIA (OUTPATIENT)
Dept: SURGERY | Facility: HOSPITAL | Age: 52
End: 2022-04-21
Payer: COMMERCIAL

## 2022-04-21 ENCOUNTER — HOSPITAL ENCOUNTER (OUTPATIENT)
Facility: HOSPITAL | Age: 52
Setting detail: HOSPITAL OUTPATIENT SURGERY
Discharge: HOME | End: 2022-04-21
Attending: OBSTETRICS & GYNECOLOGY | Admitting: OBSTETRICS & GYNECOLOGY
Payer: COMMERCIAL

## 2022-04-21 VITALS
HEIGHT: 65.5 IN | OXYGEN SATURATION: 100 % | HEART RATE: 83 BPM | DIASTOLIC BLOOD PRESSURE: 74 MMHG | BODY MASS INDEX: 33.48 KG/M2 | RESPIRATION RATE: 18 BRPM | SYSTOLIC BLOOD PRESSURE: 138 MMHG | TEMPERATURE: 97 F | WEIGHT: 203.38 LBS

## 2022-04-21 DIAGNOSIS — N93.8 DYSFUNCTIONAL UTERINE BLEEDING: ICD-10-CM

## 2022-04-21 DIAGNOSIS — Z01.818 PREOP TESTING: Primary | ICD-10-CM

## 2022-04-21 PROBLEM — Z98.890 STATUS POST HYSTEROSCOPIC POLYPECTOMY: Status: ACTIVE | Noted: 2022-04-21

## 2022-04-21 LAB — B-HCG UR QL: NEGATIVE

## 2022-04-21 PROCEDURE — 58558 HYSTEROSCOPY BIOPSY: CPT | Performed by: OBSTETRICS & GYNECOLOGY

## 2022-04-21 PROCEDURE — 0UB98ZZ EXCISION OF UTERUS, VIA NATURAL OR ARTIFICIAL OPENING ENDOSCOPIC: ICD-10-PCS | Performed by: OBSTETRICS & GYNECOLOGY

## 2022-04-21 PROCEDURE — 0UDB7ZX EXTRACTION OF ENDOMETRIUM, VIA NATURAL OR ARTIFICIAL OPENING, DIAGNOSTIC: ICD-10-PCS | Performed by: OBSTETRICS & GYNECOLOGY

## 2022-04-21 RX ORDER — DIPHENHYDRAMINE HYDROCHLORIDE 50 MG/ML
12.5 INJECTION INTRAMUSCULAR; INTRAVENOUS AS NEEDED
Status: DISCONTINUED | OUTPATIENT
Start: 2022-04-21 | End: 2022-04-21

## 2022-04-21 RX ORDER — HYDROMORPHONE HYDROCHLORIDE 1 MG/ML
0.4 INJECTION, SOLUTION INTRAMUSCULAR; INTRAVENOUS; SUBCUTANEOUS EVERY 5 MIN PRN
Status: DISCONTINUED | OUTPATIENT
Start: 2022-04-21 | End: 2022-04-21

## 2022-04-21 RX ORDER — IBUPROFEN 600 MG/1
600 TABLET ORAL EVERY 6 HOURS PRN
Qty: 30 TABLET | Refills: 0 | Status: SHIPPED | OUTPATIENT
Start: 2022-04-21

## 2022-04-21 RX ORDER — HYDROCODONE BITARTRATE AND ACETAMINOPHEN 5; 325 MG/1; MG/1
2 TABLET ORAL AS NEEDED
Status: COMPLETED | OUTPATIENT
Start: 2022-04-21 | End: 2022-04-21

## 2022-04-21 RX ORDER — DEXAMETHASONE SODIUM PHOSPHATE 4 MG/ML
VIAL (ML) INJECTION AS NEEDED
Status: DISCONTINUED | OUTPATIENT
Start: 2022-04-21 | End: 2022-04-21 | Stop reason: SURG

## 2022-04-21 RX ORDER — MIDAZOLAM HYDROCHLORIDE 1 MG/ML
1 INJECTION INTRAMUSCULAR; INTRAVENOUS EVERY 5 MIN PRN
Status: DISCONTINUED | OUTPATIENT
Start: 2022-04-21 | End: 2022-04-21

## 2022-04-21 RX ORDER — KETOROLAC TROMETHAMINE 30 MG/ML
INJECTION, SOLUTION INTRAMUSCULAR; INTRAVENOUS AS NEEDED
Status: DISCONTINUED | OUTPATIENT
Start: 2022-04-21 | End: 2022-04-21 | Stop reason: SURG

## 2022-04-21 RX ORDER — SODIUM CHLORIDE, SODIUM LACTATE, POTASSIUM CHLORIDE, CALCIUM CHLORIDE 600; 310; 30; 20 MG/100ML; MG/100ML; MG/100ML; MG/100ML
INJECTION, SOLUTION INTRAVENOUS CONTINUOUS
Status: DISCONTINUED | OUTPATIENT
Start: 2022-04-21 | End: 2022-04-21

## 2022-04-21 RX ORDER — ACETAMINOPHEN 500 MG
1000 TABLET ORAL ONCE
Status: DISCONTINUED | OUTPATIENT
Start: 2022-04-21 | End: 2022-04-21 | Stop reason: HOSPADM

## 2022-04-21 RX ORDER — MEPERIDINE HYDROCHLORIDE 25 MG/ML
12.5 INJECTION INTRAMUSCULAR; INTRAVENOUS; SUBCUTANEOUS AS NEEDED
Status: DISCONTINUED | OUTPATIENT
Start: 2022-04-21 | End: 2022-04-21

## 2022-04-21 RX ORDER — LABETALOL HYDROCHLORIDE 5 MG/ML
5 INJECTION, SOLUTION INTRAVENOUS EVERY 5 MIN PRN
Status: DISCONTINUED | OUTPATIENT
Start: 2022-04-21 | End: 2022-04-21

## 2022-04-21 RX ORDER — HYDROCODONE BITARTRATE AND ACETAMINOPHEN 5; 325 MG/1; MG/1
1 TABLET ORAL AS NEEDED
Status: COMPLETED | OUTPATIENT
Start: 2022-04-21 | End: 2022-04-21

## 2022-04-21 RX ORDER — OXYCODONE HYDROCHLORIDE 5 MG/1
5 TABLET ORAL EVERY 4 HOURS PRN
Qty: 6 TABLET | Refills: 0 | Status: SHIPPED | OUTPATIENT
Start: 2022-04-21

## 2022-04-21 RX ORDER — KETAMINE HYDROCHLORIDE 50 MG/ML
INJECTION, SOLUTION, CONCENTRATE INTRAMUSCULAR; INTRAVENOUS AS NEEDED
Status: DISCONTINUED | OUTPATIENT
Start: 2022-04-21 | End: 2022-04-21 | Stop reason: SURG

## 2022-04-21 RX ORDER — ONDANSETRON 2 MG/ML
INJECTION INTRAMUSCULAR; INTRAVENOUS AS NEEDED
Status: DISCONTINUED | OUTPATIENT
Start: 2022-04-21 | End: 2022-04-21 | Stop reason: SURG

## 2022-04-21 RX ORDER — ONDANSETRON 2 MG/ML
4 INJECTION INTRAMUSCULAR; INTRAVENOUS AS NEEDED
Status: DISCONTINUED | OUTPATIENT
Start: 2022-04-21 | End: 2022-04-21

## 2022-04-21 RX ORDER — ACETAMINOPHEN 500 MG
1000 TABLET ORAL ONCE
COMMUNITY
End: 2022-04-21

## 2022-04-21 RX ORDER — GLYCOPYRROLATE 0.2 MG/ML
INJECTION, SOLUTION INTRAMUSCULAR; INTRAVENOUS AS NEEDED
Status: DISCONTINUED | OUTPATIENT
Start: 2022-04-21 | End: 2022-04-21 | Stop reason: SURG

## 2022-04-21 RX ORDER — NALOXONE HYDROCHLORIDE 0.4 MG/ML
80 INJECTION, SOLUTION INTRAMUSCULAR; INTRAVENOUS; SUBCUTANEOUS AS NEEDED
Status: DISCONTINUED | OUTPATIENT
Start: 2022-04-21 | End: 2022-04-21

## 2022-04-21 RX ADMIN — SODIUM CHLORIDE, SODIUM LACTATE, POTASSIUM CHLORIDE, CALCIUM CHLORIDE: 600; 310; 30; 20 INJECTION, SOLUTION INTRAVENOUS at 08:14:00

## 2022-04-21 RX ADMIN — ONDANSETRON 4 MG: 2 INJECTION INTRAMUSCULAR; INTRAVENOUS at 08:10:00

## 2022-04-21 RX ADMIN — DEXAMETHASONE SODIUM PHOSPHATE 4 MG: 4 MG/ML VIAL (ML) INJECTION at 08:14:00

## 2022-04-21 RX ADMIN — ONDANSETRON 4 MG: 2 INJECTION INTRAMUSCULAR; INTRAVENOUS at 08:14:00

## 2022-04-21 RX ADMIN — SODIUM CHLORIDE, SODIUM LACTATE, POTASSIUM CHLORIDE, CALCIUM CHLORIDE: 600; 310; 30; 20 INJECTION, SOLUTION INTRAVENOUS at 07:36:00

## 2022-04-21 RX ADMIN — KETOROLAC TROMETHAMINE 30 MG: 30 INJECTION, SOLUTION INTRAMUSCULAR; INTRAVENOUS at 08:10:00

## 2022-04-21 RX ADMIN — KETAMINE HYDROCHLORIDE 25 MG: 50 INJECTION, SOLUTION, CONCENTRATE INTRAMUSCULAR; INTRAVENOUS at 07:31:00

## 2022-04-21 RX ADMIN — GLYCOPYRROLATE 0.2 MG: 0.2 INJECTION, SOLUTION INTRAMUSCULAR; INTRAVENOUS at 07:31:00

## 2022-04-21 NOTE — OPERATIVE REPORT
OPERATIVE REPORT:     PATIENT: Margo Tan  MRN: [id number]  DATE OF PROCEDURE: 04/21/22    PRE-OP DIAGNOSIS:   Abnormal uterine bleeding, perimenopausal     POST-OP DIAGNOSIS:   Same    PROCEDURE(S):   1. Dilation and curettage  2. Hysteroscopic polypectomy    ANESTHESIA:  MAC    SURGEON(S): Dr.Kimberly Kathrin MD    ESTIMATED BLOOD LOSS: 10 mL           DRAINS: 620 mL of urine    UTERINE DISTENSION MEDIUM: Normal Saline 0.9%  DEFICIT:60 mL     SPECIMENS:   1. Endometrial polyps  2. Endometrial curettage           IMPLANTS: None           COMPLICATIONS:  None    FINDINGS:   1. Midline uterus approximately 8 week size with multiple fluffy sessile endometrial polyps throughout the cavity, easily resected    INDICATIONS: see HPI    PROCEDURE: This procedure was fully reviewed with the patient and written informed consent was obtained after discussing risks, benefits, indication and alternatives. All questions were answered. The patient was taken to operative room and  MAC anesthesia was initiated. She was placed in dorsal lithotomy position. She was prepped and draped in normal sterile fashion. The bladder was emptied using a straight catheter. A sterile speculum was placed in the vagina. A single tooth tenaculum was used to grasp the anterior lip of the cervix. The cervix was gently dilated with hegar dilators to 7 mm. After the Cognia hysteroscope system was calibrated, the hysteroscope was then gently advanced into the endometrial cavity. The findings are noted above. The TRUCLEAR hysteroscopic rotary blade was then advanced through the hysteroscope under direct visualization. The TRUCLEAR hysteroscopic rotary blade simultaneously aspirated and cut the polypoid tissue throughout without any complications. This was repeat along the anterior, posterior and lateral surface of the endometrial cavity for a thorough sampling of the endometrium and until all polyps were removed.  This tissue was collected and sent to pathology. The TRUCLEAR hysteroscopic rotary blade and hysteroscope were then removed from the uterus. A smooth curette was then gently advanced into the uterine cavity and curettage was performed until a gritty texture was noted. Minimal tissue was collected and sent to pathology. The single tooth tenaculum was removed and good hemostasis was noted. Sponge, lap, needle, and instrument counts correct by two counts. The patient was taken to recovery room in stable condition. She was instructed to avoid anything in vagina for two weeks.        DISPOSITION:  To recovery room in stable condition        CONDITION: Stable      Reyes Moses MD   [facility] - OBGYN

## 2022-04-25 ENCOUNTER — TELEPHONE (OUTPATIENT)
Dept: INTERNAL MEDICINE CLINIC | Facility: CLINIC | Age: 52
End: 2022-04-25

## 2022-04-26 ENCOUNTER — NURSE ONLY (OUTPATIENT)
Dept: INTERNAL MEDICINE CLINIC | Facility: CLINIC | Age: 52
End: 2022-04-26
Payer: COMMERCIAL

## 2022-04-26 PROCEDURE — 90471 IMMUNIZATION ADMIN: CPT | Performed by: INTERNAL MEDICINE

## 2022-04-26 PROCEDURE — 90750 HZV VACC RECOMBINANT IM: CPT | Performed by: INTERNAL MEDICINE

## 2022-05-05 ENCOUNTER — OFFICE VISIT (OUTPATIENT)
Dept: OBGYN CLINIC | Facility: CLINIC | Age: 52
End: 2022-05-05
Payer: COMMERCIAL

## 2022-05-05 VITALS
BODY MASS INDEX: 33.75 KG/M2 | SYSTOLIC BLOOD PRESSURE: 132 MMHG | HEART RATE: 91 BPM | DIASTOLIC BLOOD PRESSURE: 82 MMHG | HEIGHT: 65.5 IN | WEIGHT: 205 LBS

## 2022-05-05 DIAGNOSIS — Z98.890 STATUS POST HYSTEROSCOPIC POLYPECTOMY: Primary | ICD-10-CM

## 2022-05-05 PROCEDURE — 3008F BODY MASS INDEX DOCD: CPT | Performed by: OBSTETRICS & GYNECOLOGY

## 2022-05-05 PROCEDURE — 3079F DIAST BP 80-89 MM HG: CPT | Performed by: OBSTETRICS & GYNECOLOGY

## 2022-05-05 PROCEDURE — 3075F SYST BP GE 130 - 139MM HG: CPT | Performed by: OBSTETRICS & GYNECOLOGY

## 2022-07-26 ENCOUNTER — TELEMEDICINE (OUTPATIENT)
Dept: OBGYN CLINIC | Facility: CLINIC | Age: 52
End: 2022-07-26
Payer: COMMERCIAL

## 2022-07-26 DIAGNOSIS — N93.9 ABNORMAL UTERINE BLEEDING: Primary | ICD-10-CM

## 2022-07-26 PROCEDURE — 99213 OFFICE O/P EST LOW 20 MIN: CPT | Performed by: OBSTETRICS & GYNECOLOGY

## 2022-07-26 RX ORDER — MEDROXYPROGESTERONE ACETATE 10 MG/1
10 TABLET ORAL DAILY
Qty: 30 TABLET | Refills: 2 | Status: SHIPPED | OUTPATIENT
Start: 2022-07-26

## 2022-08-18 ENCOUNTER — ULTRASOUND ENCOUNTER (OUTPATIENT)
Dept: OBGYN CLINIC | Facility: CLINIC | Age: 52
End: 2022-08-18
Payer: COMMERCIAL

## 2022-08-18 DIAGNOSIS — N93.9 ABNORMAL UTERINE BLEEDING: Primary | ICD-10-CM

## 2022-08-18 PROCEDURE — 76856 US EXAM PELVIC COMPLETE: CPT | Performed by: OBSTETRICS & GYNECOLOGY

## 2022-08-18 PROCEDURE — 76830 TRANSVAGINAL US NON-OB: CPT | Performed by: OBSTETRICS & GYNECOLOGY

## 2022-08-22 ENCOUNTER — OFFICE VISIT (OUTPATIENT)
Dept: OBGYN CLINIC | Facility: CLINIC | Age: 52
End: 2022-08-22
Payer: COMMERCIAL

## 2022-08-22 VITALS
HEART RATE: 108 BPM | BODY MASS INDEX: 32.53 KG/M2 | SYSTOLIC BLOOD PRESSURE: 122 MMHG | WEIGHT: 202.38 LBS | DIASTOLIC BLOOD PRESSURE: 72 MMHG | HEIGHT: 66 IN

## 2022-08-22 DIAGNOSIS — N93.9 ABNORMAL UTERINE BLEEDING: Primary | ICD-10-CM

## 2022-08-22 RX ORDER — MEDROXYPROGESTERONE ACETATE 10 MG/1
10 TABLET ORAL 3 TIMES DAILY
Qty: 90 TABLET | Refills: 3 | Status: SHIPPED | OUTPATIENT
Start: 2022-08-22

## 2022-09-12 ENCOUNTER — TELEPHONE (OUTPATIENT)
Dept: OBGYN CLINIC | Facility: CLINIC | Age: 52
End: 2022-09-12

## 2022-09-12 DIAGNOSIS — N92.0 MENORRHAGIA WITH REGULAR CYCLE: Primary | ICD-10-CM

## 2022-09-12 NOTE — TELEPHONE ENCOUNTER
----- Message from Delmar Giles MD sent at 9/6/2022 11:21 AM CDT -----  Regarding: FW: Follow up  Please schedule for surgery:    Date: as soon as we can    Surgeon: Tania Juan    Assistant: none    Type of admit: outpatient    Preop dx: menorrhagia    Procedure:   1. Hysteroscopy  2. Dilation and curettage  3. Astrid endometrial ablation    Anesthesia type: choice    Special equipment/implants: Astrid ablation    Preop orders: Initiate my preop orders, if none exist please use [employer] Preop Orders    Labs: urine HCG,. CBC    ----- Message -----  From: Ezra Marie  Sent: 9/6/2022  10:18 AM CDT  To: Delmar Giles MD  Subject: Follow up                                        Hi Dr. Tania Juan,  I assume the ablation procedure can be done even when I am bleeding, correct? I have no idea when I may stop bleeding again. This is the sixth day so far of heavier bleeding. The flow usually slows down some during the day and then gets heavier in the evening.   Aris Casarez

## 2022-09-12 NOTE — TELEPHONE ENCOUNTER
Surgery scheduled for 10/19/22 at 230  Post op   Future Appointments   Date Time Provider Hero Joseph   11/9/2022 12:15 PM Perez Hawley MD [facility] P [facility] [address]   1/20/2023  2:15 PM Perez Hawley MD [facility] P [facility] [address]     Orders entered  Added to talya PATHAK -   The notification/prior authorization case information was transmitted on 09/14/2022 at 10:00 AM CDT. The notification/prior authorization reference number is [id number]. Please print this page for your records.     Your Notification/Prior Authorization submission has been Approved and no further action is required for this request. Please note that it may take a few days for the procedure coverage

## 2022-10-14 RX ORDER — SCOLOPAMINE TRANSDERMAL SYSTEM 1 MG/1
1 PATCH, EXTENDED RELEASE TRANSDERMAL ONCE
OUTPATIENT
Start: 2022-10-14 | End: 2022-10-14

## 2022-10-17 ENCOUNTER — LABORATORY ENCOUNTER (OUTPATIENT)
Dept: LAB | Age: 52
End: 2022-10-17
Attending: OBSTETRICS & GYNECOLOGY
Payer: COMMERCIAL

## 2022-10-17 ENCOUNTER — LAB ENCOUNTER (OUTPATIENT)
Dept: LAB | Age: 52
End: 2022-10-17
Attending: OBSTETRICS & GYNECOLOGY
Payer: COMMERCIAL

## 2022-10-17 DIAGNOSIS — N92.0 MENORRHAGIA WITH REGULAR CYCLE: ICD-10-CM

## 2022-10-17 LAB
ERYTHROCYTE [DISTWIDTH] IN BLOOD BY AUTOMATED COUNT: 11.9 %
HCT VFR BLD AUTO: 45.5 %
HGB BLD-MCNC: 14.8 G/DL
MCH RBC QN AUTO: 30.1 PG (ref 26–34)
MCHC RBC AUTO-ENTMCNC: 32.5 G/DL (ref 31–37)
MCV RBC AUTO: 92.7 FL
PLATELET # BLD AUTO: 363 10(3)UL (ref 150–450)
RBC # BLD AUTO: 4.91 X10(6)UL
WBC # BLD AUTO: 6.3 X10(3) UL (ref 4–11)

## 2022-10-17 PROCEDURE — 85027 COMPLETE CBC AUTOMATED: CPT

## 2022-10-17 PROCEDURE — 36415 COLL VENOUS BLD VENIPUNCTURE: CPT

## 2022-10-18 ENCOUNTER — ANESTHESIA EVENT (OUTPATIENT)
Dept: SURGERY | Facility: HOSPITAL | Age: 52
End: 2022-10-18
Payer: COMMERCIAL

## 2022-10-18 LAB — SARS-COV-2 RNA RESP QL NAA+PROBE: NOT DETECTED

## 2022-10-19 ENCOUNTER — ANESTHESIA (OUTPATIENT)
Dept: SURGERY | Facility: HOSPITAL | Age: 52
End: 2022-10-19
Payer: COMMERCIAL

## 2022-10-19 ENCOUNTER — HOSPITAL ENCOUNTER (OUTPATIENT)
Facility: HOSPITAL | Age: 52
Setting detail: HOSPITAL OUTPATIENT SURGERY
Discharge: HOME | End: 2022-10-19
Attending: OBSTETRICS & GYNECOLOGY | Admitting: OBSTETRICS & GYNECOLOGY
Payer: COMMERCIAL

## 2022-10-19 VITALS
BODY MASS INDEX: 31.66 KG/M2 | SYSTOLIC BLOOD PRESSURE: 140 MMHG | WEIGHT: 197 LBS | HEIGHT: 66 IN | DIASTOLIC BLOOD PRESSURE: 79 MMHG | HEART RATE: 82 BPM | TEMPERATURE: 97 F | OXYGEN SATURATION: 98 % | RESPIRATION RATE: 16 BRPM

## 2022-10-19 DIAGNOSIS — N92.0 MENORRHAGIA WITH REGULAR CYCLE: Primary | ICD-10-CM

## 2022-10-19 LAB — B-HCG UR QL: NEGATIVE

## 2022-10-19 PROCEDURE — 0U5B8ZZ DESTRUCTION OF ENDOMETRIUM, VIA NATURAL OR ARTIFICIAL OPENING ENDOSCOPIC: ICD-10-PCS | Performed by: OBSTETRICS & GYNECOLOGY

## 2022-10-19 PROCEDURE — 0UDB8ZX EXTRACTION OF ENDOMETRIUM, VIA NATURAL OR ARTIFICIAL OPENING ENDOSCOPIC, DIAGNOSTIC: ICD-10-PCS | Performed by: OBSTETRICS & GYNECOLOGY

## 2022-10-19 PROCEDURE — 58563 HYSTEROSCOPY ABLATION: CPT | Performed by: OBSTETRICS & GYNECOLOGY

## 2022-10-19 RX ORDER — NALOXONE HYDROCHLORIDE 0.4 MG/ML
80 INJECTION, SOLUTION INTRAMUSCULAR; INTRAVENOUS; SUBCUTANEOUS AS NEEDED
Status: DISCONTINUED | OUTPATIENT
Start: 2022-10-19 | End: 2022-10-19

## 2022-10-19 RX ORDER — HYDROCODONE BITARTRATE AND ACETAMINOPHEN 5; 325 MG/1; MG/1
1 TABLET ORAL ONCE AS NEEDED
Status: COMPLETED | OUTPATIENT
Start: 2022-10-19 | End: 2022-10-19

## 2022-10-19 RX ORDER — PROCHLORPERAZINE EDISYLATE 5 MG/ML
5 INJECTION INTRAMUSCULAR; INTRAVENOUS EVERY 8 HOURS PRN
Status: DISCONTINUED | OUTPATIENT
Start: 2022-10-19 | End: 2022-10-19

## 2022-10-19 RX ORDER — HYDROMORPHONE HYDROCHLORIDE 1 MG/ML
0.6 INJECTION, SOLUTION INTRAMUSCULAR; INTRAVENOUS; SUBCUTANEOUS EVERY 5 MIN PRN
Status: DISCONTINUED | OUTPATIENT
Start: 2022-10-19 | End: 2022-10-19

## 2022-10-19 RX ORDER — IBUPROFEN 600 MG/1
600 TABLET ORAL EVERY 6 HOURS PRN
Refills: 0 | Status: SHIPPED | COMMUNITY
Start: 2022-10-19

## 2022-10-19 RX ORDER — ACETAMINOPHEN 500 MG
1000 TABLET ORAL ONCE
Status: DISCONTINUED | OUTPATIENT
Start: 2022-10-19 | End: 2022-10-19 | Stop reason: HOSPADM

## 2022-10-19 RX ORDER — ACETAMINOPHEN 500 MG
1000 TABLET ORAL EVERY 6 HOURS PRN
COMMUNITY

## 2022-10-19 RX ORDER — DEXAMETHASONE SODIUM PHOSPHATE 4 MG/ML
VIAL (ML) INJECTION AS NEEDED
Status: DISCONTINUED | OUTPATIENT
Start: 2022-10-19 | End: 2022-10-19 | Stop reason: SURG

## 2022-10-19 RX ORDER — HYDROMORPHONE HYDROCHLORIDE 1 MG/ML
INJECTION, SOLUTION INTRAMUSCULAR; INTRAVENOUS; SUBCUTANEOUS
Status: COMPLETED
Start: 2022-10-19 | End: 2022-10-19

## 2022-10-19 RX ORDER — ONDANSETRON 2 MG/ML
INJECTION INTRAMUSCULAR; INTRAVENOUS AS NEEDED
Status: DISCONTINUED | OUTPATIENT
Start: 2022-10-19 | End: 2022-10-19 | Stop reason: SURG

## 2022-10-19 RX ORDER — KETOROLAC TROMETHAMINE 30 MG/ML
INJECTION, SOLUTION INTRAMUSCULAR; INTRAVENOUS AS NEEDED
Status: DISCONTINUED | OUTPATIENT
Start: 2022-10-19 | End: 2022-10-19 | Stop reason: SURG

## 2022-10-19 RX ORDER — HYDROMORPHONE HYDROCHLORIDE 1 MG/ML
0.2 INJECTION, SOLUTION INTRAMUSCULAR; INTRAVENOUS; SUBCUTANEOUS EVERY 5 MIN PRN
Status: DISCONTINUED | OUTPATIENT
Start: 2022-10-19 | End: 2022-10-19

## 2022-10-19 RX ORDER — SODIUM CHLORIDE, SODIUM LACTATE, POTASSIUM CHLORIDE, CALCIUM CHLORIDE 600; 310; 30; 20 MG/100ML; MG/100ML; MG/100ML; MG/100ML
INJECTION, SOLUTION INTRAVENOUS CONTINUOUS
Status: DISCONTINUED | OUTPATIENT
Start: 2022-10-19 | End: 2022-10-19

## 2022-10-19 RX ORDER — ONDANSETRON 2 MG/ML
4 INJECTION INTRAMUSCULAR; INTRAVENOUS EVERY 6 HOURS PRN
Status: DISCONTINUED | OUTPATIENT
Start: 2022-10-19 | End: 2022-10-19

## 2022-10-19 RX ORDER — LIDOCAINE HYDROCHLORIDE 10 MG/ML
INJECTION, SOLUTION EPIDURAL; INFILTRATION; INTRACAUDAL; PERINEURAL AS NEEDED
Status: DISCONTINUED | OUTPATIENT
Start: 2022-10-19 | End: 2022-10-19 | Stop reason: SURG

## 2022-10-19 RX ORDER — HYDROMORPHONE HYDROCHLORIDE 1 MG/ML
0.4 INJECTION, SOLUTION INTRAMUSCULAR; INTRAVENOUS; SUBCUTANEOUS EVERY 5 MIN PRN
Status: DISCONTINUED | OUTPATIENT
Start: 2022-10-19 | End: 2022-10-19

## 2022-10-19 RX ORDER — HYDROCODONE BITARTRATE AND ACETAMINOPHEN 5; 325 MG/1; MG/1
2 TABLET ORAL ONCE AS NEEDED
Status: COMPLETED | OUTPATIENT
Start: 2022-10-19 | End: 2022-10-19

## 2022-10-19 RX ORDER — ACETAMINOPHEN 500 MG
1000 TABLET ORAL ONCE AS NEEDED
Status: COMPLETED | OUTPATIENT
Start: 2022-10-19 | End: 2022-10-19

## 2022-10-19 RX ADMIN — DEXAMETHASONE SODIUM PHOSPHATE 8 MG: 4 MG/ML VIAL (ML) INJECTION at 14:55:00

## 2022-10-19 RX ADMIN — ONDANSETRON 4 MG: 2 INJECTION INTRAMUSCULAR; INTRAVENOUS at 15:25:00

## 2022-10-19 RX ADMIN — LIDOCAINE HYDROCHLORIDE 50 MG: 10 INJECTION, SOLUTION EPIDURAL; INFILTRATION; INTRACAUDAL; PERINEURAL at 14:53:00

## 2022-10-19 RX ADMIN — SODIUM CHLORIDE, SODIUM LACTATE, POTASSIUM CHLORIDE, CALCIUM CHLORIDE: 600; 310; 30; 20 INJECTION, SOLUTION INTRAVENOUS at 14:50:00

## 2022-10-19 RX ADMIN — KETOROLAC TROMETHAMINE 30 MG: 30 INJECTION, SOLUTION INTRAMUSCULAR; INTRAVENOUS at 15:25:00

## 2022-10-19 NOTE — ANESTHESIA PROCEDURE NOTES
Airway  Date/Time: 10/19/2022 2:54 PM  Urgency: Elective      General Information and Staff    Patient location during procedure: OR  Anesthesiologist: Alejandrina Whaley MD  Resident/CRNA: Mak Dennis CRNA  Performed: CRNA     Indications and Patient Condition  Indications for airway management: anesthesia  Sedation level: deep  Preoxygenated: yes  Patient position: sniffing  Mask difficulty assessment: 1 - vent by mask    Final Airway Details  Final airway type: supraglottic airway      Successful airway: classic  Size 3      Number of attempts at approach: 1    Additional Comments  Atraumatic insertion, dentition and lips as preop

## 2022-10-19 NOTE — ANESTHESIA POSTPROCEDURE EVALUATION
[facility]    Daniel Stoner Patient Status:  Hospital Outpatient Surgery   Age/Gender 47year old female MRN [id number]   [facility] SURGERY Attending Maria Dolores Root MD   [facility] Day # 0 PCP Teressa Chavez MD       Anesthesia Post-op Note    TRUCLEAR HYSTEROSCOPY, Dilation and curettage, Astrid endometrial ablation    Procedure Summary     Date: 10/19/22 Room / Location: [facility] MAIN OR 11 / [facility] MAIN OR    Anesthesia Start: 3710 Anesthesia Stop: 8369    Procedure: Trang Lopez HYSTEROSCOPY, Dilation and curettage, Astrid endometrial ablation (N/A ) Diagnosis:       Menorrhagia with regular cycle      (Menorrhagia with regular cycle [N92.0])    Surgeons: Maria Dolores Root MD Anesthesiologist: Kameron Cerda MD    Anesthesia Type: general ASA Status: 1          Anesthesia Type: general    Vitals Value Taken Time   /75 10/19/22 1538   Temp 98.7 10/19/22 1540   Pulse 82 10/19/22 1539   Resp 17 10/19/22 1539   SpO2 95 % 10/19/22 1539   Vitals shown include unvalidated device data. Patient Location: PACU    Anesthesia Type: general    Airway Patency: patent    Postop Pain Control: adequate    Mental Status: mildly sedated but able to meaningfully participate in the post-anesthesia evaluation    Nausea/Vomiting: none    Cardiopulmonary/Hydration status: stable euvolemic    Complications: no apparent anesthesia related complications    Postop vital signs: stable    Dental Exam: Unchanged from Preop    Patient to be discharged from PACU when criteria met.

## 2022-10-19 NOTE — OPERATIVE REPORT
OPERATIVE REPORT:     PATIENT: Ruben Alcaraz  MRN: [id number]  DATE OF PROCEDURE: 10/19/22    PRE-OP DIAGNOSIS:   1. 48 yo  with abnormal perimenopausal bleeding     POST-OP DIAGNOSIS:   Same    PROCEDURE(S):   1. Hysteroscopy, dilation and curettage  2. Astrid endometrial ablation    ANESTHESIA: general    SURGEON(S): Dr.Kimberly Kathrin MD    ESTIMATED BLOOD LOSS: 2 mL           DRAINS: urine output 200 mL via straight cath    UTERINE DISTENSION MEDIUM: Normal Saline 0.9%  DEFICIT:90 mL     SPECIMENS:   1. Endometrial curettings           IMPLANTS: None           COMPLICATIONS:  None    FINDINGS:   1. Midposition uterus sounded to 10 cm, cervical length 4 cm, cavity length 6 cm  2. Moderate amount of non-adherent endometrium floating within the uterus  3. Stage 2 cystocele  4. Endometrium without any polyps or lesions prior to ablation  5. Post ablation uterus intact without any evidence of uterine perforation or defect    INDICATIONS: see HPI    PROCEDURE: This procedure was fully reviewed with the patient and written informed consent was obtained after discussing risks, benefits, indication and alternatives. All questions were answered. The patient was taken to operative room and general anesthesia was initiated. She was placed in dorsal lithotomy position. She was prepped and draped in normal sterile fashion. The bladder was emptied using a straight catheter. Once the patient was relaxed, worsening of her cystocele was noted. A sterile speculum was placed in the vagina. A single tooth tenaculum was used to grasp the anterior lip of the cervix. The cervix was gently dilated with hegar dilators to 7 mm. After the RETCtronic hysteroscope system was calibrated, the hysteroscope was then gently advanced into the endometrial cavity. The findings are noted above. With infusion of saline, the endometrium floating within the cavity was cleared easily and not adherent.  There was nothing present that needed hysteroscopic resection. The hysteroscope were then removed from the uterus. A smooth curette was then gently advanced into the uterine cavity and curettage was performed until a gritty texture was noted. Minimal tissue was collected and sent to pathology. Then, the Astrid endometrial ablation device was opened. The cervix was dilated to 8 mm. The device was then inserted into the uterus until the fundus reached, retracted slightly, and then the device was deployed. The cervical seal balloon was inflated. Then the cavity assessment test was activated and passed. The device then activated and completed the full 2 minute cycle. The device was then removed, with charred endometrium noted on the device. The hysteroscope was reinserted and the cavity surveyed. A good diffuse mark was noted and there were no defects to the integrity of the uterus. The single tooth tenaculum was removed and good hemostasis was noted. Sponge, lap, needle, and instrument counts correct by two counts. The patient was taken to recovery room in stable condition. She was instructed to avoid anything in vagina for two weeks.        DISPOSITION:  To recovery room in stable condition        CONDITION: Stable      MD LAZARA Espinosa - OBGYN

## 2022-10-25 ENCOUNTER — IMMUNIZATION (OUTPATIENT)
Dept: INTERNAL MEDICINE CLINIC | Facility: CLINIC | Age: 52
End: 2022-10-25
Payer: COMMERCIAL

## 2022-10-25 DIAGNOSIS — Z23 NEED FOR VACCINATION: Primary | ICD-10-CM

## 2022-10-25 PROCEDURE — 90471 IMMUNIZATION ADMIN: CPT | Performed by: INTERNAL MEDICINE

## 2022-10-25 PROCEDURE — 90686 IIV4 VACC NO PRSV 0.5 ML IM: CPT | Performed by: INTERNAL MEDICINE

## 2022-11-08 PROBLEM — Z98.890 STATUS POST ENDOMETRIAL ABLATION: Status: ACTIVE | Noted: 2022-11-08

## 2022-11-09 ENCOUNTER — OFFICE VISIT (OUTPATIENT)
Dept: OBGYN CLINIC | Facility: CLINIC | Age: 52
End: 2022-11-09
Payer: COMMERCIAL

## 2022-11-09 VITALS
DIASTOLIC BLOOD PRESSURE: 76 MMHG | HEIGHT: 65.5 IN | HEART RATE: 100 BPM | WEIGHT: 199.38 LBS | BODY MASS INDEX: 32.82 KG/M2 | SYSTOLIC BLOOD PRESSURE: 118 MMHG

## 2022-11-09 DIAGNOSIS — Z12.31 ENCOUNTER FOR SCREENING MAMMOGRAM FOR MALIGNANT NEOPLASM OF BREAST: ICD-10-CM

## 2022-11-09 DIAGNOSIS — Z98.890 STATUS POST ENDOMETRIAL ABLATION: Primary | ICD-10-CM

## 2022-11-09 NOTE — PROGRESS NOTES
GYN Post Operative Note    Juve Cagle is a 47year old  who presents status post D&C, hysteroscopy, Dedrick Andre performed on 10/19/2022 for AUB. She reports she is doing ok today. Vaginal bleeding has mostly stopped, just light brown now and continued discharge. Occasional cramping  Wants to discuss cystocele  Noted at time of surgery  Denies fevers or chills. 22  1207   BP: 118/76   Pulse: 100           General: patient awake, alert, oriented, in no acute distress  Abdomen: soft, nontender, nondistended  Extremities: no edema noted    Speculum exam:  Cervix: visibly normal  Uterus: normal size, nontender  Adnexa: no masses    Pathology:   Final Diagnosis:   Endometrium, curettage:  -Fragments of weakly proliferative endometrium with patchy acute and chronic inflammation and benign metaplastic change. A/P: 47year old  here for post op visit s/p D&C. Hysteroscopy endometrial ablation  1. Vitals WNL, afebrile  2. Post op visit: reviewed expectations from ablation. I would suggest she try o give it 6 months to assess if successful  3. We reviewed if cystocele asymptomatic I would not recommend intervention. We reviewed kegals  4.  Recheck in 6 months for annual exam    Danielle Obregon MD, 22, 12:42 PM

## 2023-02-27 ENCOUNTER — OFFICE VISIT (OUTPATIENT)
Dept: INTERNAL MEDICINE CLINIC | Facility: CLINIC | Age: 53
End: 2023-02-27
Payer: COMMERCIAL

## 2023-02-27 VITALS
HEIGHT: 65.25 IN | WEIGHT: 201 LBS | SYSTOLIC BLOOD PRESSURE: 130 MMHG | HEART RATE: 77 BPM | BODY MASS INDEX: 33.09 KG/M2 | DIASTOLIC BLOOD PRESSURE: 80 MMHG | OXYGEN SATURATION: 98 % | RESPIRATION RATE: 16 BRPM | TEMPERATURE: 98 F

## 2023-02-27 DIAGNOSIS — Z00.00 LABORATORY EXAM ORDERED AS PART OF ROUTINE GENERAL MEDICAL EXAMINATION: ICD-10-CM

## 2023-02-27 DIAGNOSIS — Z00.00 ROUTINE GENERAL MEDICAL EXAMINATION AT A HEALTH CARE FACILITY: Primary | ICD-10-CM

## 2023-02-27 DIAGNOSIS — R74.8 ELEVATED LIVER ENZYMES: ICD-10-CM

## 2023-02-27 DIAGNOSIS — E66.9 OBESITY (BMI 30-39.9): ICD-10-CM

## 2023-02-27 DIAGNOSIS — E55.9 HYPOVITAMINOSIS D: ICD-10-CM

## 2023-02-27 PROCEDURE — 99396 PREV VISIT EST AGE 40-64: CPT | Performed by: INTERNAL MEDICINE

## 2023-02-27 PROCEDURE — 3075F SYST BP GE 130 - 139MM HG: CPT | Performed by: INTERNAL MEDICINE

## 2023-02-27 PROCEDURE — 3008F BODY MASS INDEX DOCD: CPT | Performed by: INTERNAL MEDICINE

## 2023-02-27 PROCEDURE — 3079F DIAST BP 80-89 MM HG: CPT | Performed by: INTERNAL MEDICINE

## 2023-03-01 ENCOUNTER — LAB ENCOUNTER (OUTPATIENT)
Dept: LAB | Age: 53
End: 2023-03-01
Attending: INTERNAL MEDICINE
Payer: COMMERCIAL

## 2023-03-01 LAB
ALBUMIN SERPL-MCNC: 3.5 G/DL (ref 3.4–5)
ALBUMIN/GLOB SERPL: 0.8 {RATIO} (ref 1–2)
ALP LIVER SERPL-CCNC: 99 U/L
ALT SERPL-CCNC: 42 U/L
ANION GAP SERPL CALC-SCNC: 8 MMOL/L (ref 0–18)
AST SERPL-CCNC: 20 U/L (ref 15–37)
BILIRUB SERPL-MCNC: 0.3 MG/DL (ref 0.1–2)
BUN BLD-MCNC: 14 MG/DL (ref 7–18)
CALCIUM BLD-MCNC: 9.6 MG/DL (ref 8.5–10.1)
CHLORIDE SERPL-SCNC: 105 MMOL/L (ref 98–112)
CHOLEST SERPL-MCNC: 237 MG/DL (ref ?–200)
CO2 SERPL-SCNC: 27 MMOL/L (ref 21–32)
CREAT BLD-MCNC: 0.79 MG/DL
FASTING PATIENT LIPID ANSWER: YES
FASTING STATUS PATIENT QL REPORTED: YES
GFR SERPLBLD BASED ON 1.73 SQ M-ARVRAT: 91 ML/MIN/1.73M2 (ref 60–?)
GLOBULIN PLAS-MCNC: 4.5 G/DL (ref 2.8–4.4)
GLUCOSE BLD-MCNC: 94 MG/DL (ref 70–99)
HDLC SERPL-MCNC: 45 MG/DL (ref 40–59)
LDLC SERPL CALC-MCNC: 150 MG/DL (ref ?–100)
NONHDLC SERPL-MCNC: 192 MG/DL (ref ?–130)
OSMOLALITY SERPL CALC.SUM OF ELEC: 290 MOSM/KG (ref 275–295)
POTASSIUM SERPL-SCNC: 4.3 MMOL/L (ref 3.5–5.1)
PROT SERPL-MCNC: 8 G/DL (ref 6.4–8.2)
SODIUM SERPL-SCNC: 140 MMOL/L (ref 136–145)
TRIGL SERPL-MCNC: 232 MG/DL (ref 30–149)
TSI SER-ACNC: 0.79 MIU/ML (ref 0.36–3.74)
VIT D+METAB SERPL-MCNC: 33.2 NG/ML (ref 30–100)
VLDLC SERPL CALC-MCNC: 45 MG/DL (ref 0–30)

## 2023-03-01 PROCEDURE — 80053 COMPREHEN METABOLIC PANEL: CPT | Performed by: INTERNAL MEDICINE

## 2023-03-01 PROCEDURE — 82306 VITAMIN D 25 HYDROXY: CPT | Performed by: INTERNAL MEDICINE

## 2023-03-01 PROCEDURE — 84443 ASSAY THYROID STIM HORMONE: CPT | Performed by: INTERNAL MEDICINE

## 2023-03-01 PROCEDURE — 80061 LIPID PANEL: CPT | Performed by: INTERNAL MEDICINE

## 2023-03-01 PROCEDURE — 36415 COLL VENOUS BLD VENIPUNCTURE: CPT | Performed by: INTERNAL MEDICINE

## 2023-03-06 ENCOUNTER — PATIENT MESSAGE (OUTPATIENT)
Dept: INTERNAL MEDICINE CLINIC | Facility: CLINIC | Age: 53
End: 2023-03-06

## 2023-03-06 NOTE — TELEPHONE ENCOUNTER
From: Ernie Spicer  To: Kirstin Frausto MD  Sent: 3/6/2023 12:17 PM CST  Subject: Follow-up physical blood work questions    Hi Dr. Patrick Shook,  In your previous response you mentioned I should take more vitamin D supplement. I am currently taking 3,000 IU daily of D3. I am taking a 2,000IU (50mcg)supplement and a 1,ooo IU in my daily vitamin. How much more should I take daily? You said my cholesterol is high. What would you recommend I do to help lower the number to under 237? I am still planning to get a heart scan next week. Thank you.   Poncho Fox

## 2023-03-07 ENCOUNTER — HOSPITAL ENCOUNTER (OUTPATIENT)
Dept: MAMMOGRAPHY | Age: 53
Discharge: HOME | End: 2023-03-07
Attending: OBSTETRICS & GYNECOLOGY
Payer: COMMERCIAL

## 2023-03-07 DIAGNOSIS — Z12.31 ENCOUNTER FOR SCREENING MAMMOGRAM FOR MALIGNANT NEOPLASM OF BREAST: ICD-10-CM

## 2023-03-07 PROCEDURE — 77067 SCR MAMMO BI INCL CAD: CPT | Performed by: OBSTETRICS & GYNECOLOGY

## 2023-03-07 PROCEDURE — 77063 BREAST TOMOSYNTHESIS BI: CPT | Performed by: OBSTETRICS & GYNECOLOGY

## 2023-03-15 ENCOUNTER — HOSPITAL ENCOUNTER (OUTPATIENT)
Dept: CT IMAGING | Age: 53
Discharge: HOME | End: 2023-03-15
Attending: INTERNAL MEDICINE

## 2023-03-15 DIAGNOSIS — Z13.6 ENCOUNTER FOR SCREENING FOR CARDIOVASCULAR DISORDERS: ICD-10-CM

## 2023-03-15 DIAGNOSIS — Z13.9 SCREENING DUE: ICD-10-CM

## 2023-05-08 ENCOUNTER — APPOINTMENT (OUTPATIENT)
Dept: GENERAL RADIOLOGY | Age: 53
End: 2023-05-08
Attending: PHYSICIAN ASSISTANT
Payer: COMMERCIAL

## 2023-05-08 ENCOUNTER — HOSPITAL ENCOUNTER (OUTPATIENT)
Age: 53
Discharge: HOME | End: 2023-05-08
Payer: COMMERCIAL

## 2023-05-08 ENCOUNTER — OFFICE VISIT (OUTPATIENT)
Dept: OBGYN CLINIC | Facility: CLINIC | Age: 53
End: 2023-05-08
Payer: COMMERCIAL

## 2023-05-08 VITALS
WEIGHT: 200 LBS | OXYGEN SATURATION: 99 % | BODY MASS INDEX: 33 KG/M2 | DIASTOLIC BLOOD PRESSURE: 77 MMHG | TEMPERATURE: 98 F | HEART RATE: 85 BPM | RESPIRATION RATE: 18 BRPM | SYSTOLIC BLOOD PRESSURE: 160 MMHG

## 2023-05-08 VITALS
DIASTOLIC BLOOD PRESSURE: 90 MMHG | HEART RATE: 89 BPM | BODY MASS INDEX: 33 KG/M2 | WEIGHT: 200.13 LBS | SYSTOLIC BLOOD PRESSURE: 122 MMHG

## 2023-05-08 DIAGNOSIS — N95.1 HOT FLUSHES, PERIMENOPAUSAL: ICD-10-CM

## 2023-05-08 DIAGNOSIS — Z98.890 STATUS POST ENDOMETRIAL ABLATION: Primary | ICD-10-CM

## 2023-05-08 DIAGNOSIS — T14.8XXA ABRASION OF SKIN: ICD-10-CM

## 2023-05-08 DIAGNOSIS — S69.92XA INJURY OF LEFT WRIST, INITIAL ENCOUNTER: Primary | ICD-10-CM

## 2023-05-08 PROCEDURE — 73110 X-RAY EXAM OF WRIST: CPT | Performed by: PHYSICIAN ASSISTANT

## 2023-05-08 PROCEDURE — 99213 OFFICE O/P EST LOW 20 MIN: CPT

## 2023-05-08 PROCEDURE — 99204 OFFICE O/P NEW MOD 45 MIN: CPT
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
## 2023-05-08 NOTE — DISCHARGE INSTRUCTIONS
Wear the wrist splint as directed. Ice and elevate the affected area. Tylenol or ibuprofen as needed for pain. Activity as tolerated. Keep the wounds clean and dry. Apply bacitracin twice daily. Monitor for signs of infection. If new, changing or worsening symptoms return for reevaluation. Follow-up with Ortho. See your discharge paperwork for referral information. If there are any new, changing or worsening symptoms return for reevaluation or go to the ER.

## 2023-05-18 PROBLEM — D12.2 BENIGN NEOPLASM OF ASCENDING COLON: Status: ACTIVE | Noted: 2023-05-18

## 2023-05-18 PROBLEM — Z12.11 SPECIAL SCREENING FOR MALIGNANT NEOPLASM OF COLON: Status: ACTIVE | Noted: 2023-05-18

## 2023-10-03 ENCOUNTER — IMMUNIZATION (OUTPATIENT)
Dept: INTERNAL MEDICINE CLINIC | Facility: CLINIC | Age: 53
End: 2023-10-03
Payer: COMMERCIAL

## 2023-10-03 DIAGNOSIS — Z23 NEED FOR VACCINATION: Primary | ICD-10-CM

## 2023-10-03 PROCEDURE — 90471 IMMUNIZATION ADMIN: CPT | Performed by: INTERNAL MEDICINE

## 2023-10-03 PROCEDURE — 90686 IIV4 VACC NO PRSV 0.5 ML IM: CPT | Performed by: INTERNAL MEDICINE

## 2023-11-27 PROBLEM — D12.4 BENIGN NEOPLASM OF DESCENDING COLON: Status: ACTIVE | Noted: 2023-11-27

## 2023-11-27 PROBLEM — Z86.0101 HISTORY OF ADENOMATOUS POLYP OF COLON: Status: ACTIVE | Noted: 2023-11-27

## 2023-11-27 PROBLEM — Z86.010 HISTORY OF ADENOMATOUS POLYP OF COLON: Status: ACTIVE | Noted: 2023-11-27

## 2024-03-13 ENCOUNTER — OFFICE VISIT (OUTPATIENT)
Dept: INTERNAL MEDICINE CLINIC | Facility: CLINIC | Age: 54
End: 2024-03-13
Payer: COMMERCIAL

## 2024-03-13 VITALS
OXYGEN SATURATION: 96 % | WEIGHT: 193.38 LBS | HEART RATE: 86 BPM | SYSTOLIC BLOOD PRESSURE: 120 MMHG | RESPIRATION RATE: 16 BRPM | TEMPERATURE: 98 F | BODY MASS INDEX: 31.83 KG/M2 | DIASTOLIC BLOOD PRESSURE: 80 MMHG | HEIGHT: 65.25 IN

## 2024-03-13 DIAGNOSIS — Z00.00 ENCOUNTER FOR ROUTINE ADULT MEDICAL EXAMINATION: Primary | ICD-10-CM

## 2024-03-13 DIAGNOSIS — G47.00 INSOMNIA, UNSPECIFIED TYPE: ICD-10-CM

## 2024-03-13 DIAGNOSIS — Z12.31 SCREENING MAMMOGRAM FOR BREAST CANCER: ICD-10-CM

## 2024-03-13 DIAGNOSIS — Z00.00 LABORATORY EXAM ORDERED AS PART OF ROUTINE GENERAL MEDICAL EXAMINATION: ICD-10-CM

## 2024-03-13 DIAGNOSIS — R23.2 HOT FLASHES: ICD-10-CM

## 2024-03-13 DIAGNOSIS — E55.9 HYPOVITAMINOSIS D: ICD-10-CM

## 2024-03-13 DIAGNOSIS — I73.9 CLAUDICATION (HCC): ICD-10-CM

## 2024-03-13 PROCEDURE — 99213 OFFICE O/P EST LOW 20 MIN: CPT | Performed by: INTERNAL MEDICINE

## 2024-03-13 PROCEDURE — 99396 PREV VISIT EST AGE 40-64: CPT | Performed by: INTERNAL MEDICINE

## 2024-03-13 RX ORDER — TRAZODONE HYDROCHLORIDE 50 MG/1
TABLET ORAL NIGHTLY PRN
Qty: 30 TABLET | Refills: 0 | Status: SHIPPED | OUTPATIENT
Start: 2024-03-13

## 2024-03-13 NOTE — PROGRESS NOTES
[facility] Internal Medicine Office Note  Chief Complaint:   Chief Complaint   Patient presents with    Physical       HPI:   This is a 53 year old female coming in for physical   HPI    Her daughter has been sick with the flu. She worse a mask when caring for her. Patient reports some minor nasal congestion and otherwise feels ok      Received shingirx vaccine     Mammogram due 3/2024    Colonoscopy due in 11/2025  Multiple colon polyps seen on previous colonoscopy    Having menopause symptoms. Taking menoquil supplement   Having trouble sleeping   She has appt with Dr. Zamudio coming up  Hot flashes are more often     She notes her legs get tired with walking   She denies pain   She gets leg cramps at night       Patient Active Problem List   Diagnosis    Candidal intertrigo    Class 1 obesity due to excess calories without serious comorbidity with body mass index (BMI) of 30.0 to 30.9 in adult    Hot flushes, perimenopausal    Intramural leiomyoma of uterus    Cystocele, midline    Status post hysteroscopic polypectomy    Preop testing    Abnormal uterine bleeding    Menorrhagia with regular cycle    Status post endometrial ablation    Special screening for malignant neoplasm of colon    Benign neoplasm of ascending colon    History of adenomatous polyp of colon    Benign neoplasm of descending colon     Past Surgical History:   Procedure Laterality Date    Colonoscopy  5-18-23    D & c  04/21/2022    Endometrial biopsy - jar(s): 2  04/21/2022    Hysteroscopy  04/21/2022    [place] teeth removed       Family History   Problem Relation Age of Onset    Cancer Father 74        psa and lungs    Prostate Cancer Father     Hypertension Mother         I reviewed her's Past Medical History, Past Surgical History, Family History and   Social History updated shows  Social History     Socioeconomic History    Marital status:    Tobacco Use    Smoking status: Never     Passive exposure: Never    Smokeless  tobacco: Never   Vaping Use    Vaping Use: Never used   Substance and Sexual Activity    Alcohol use: Never    Drug use: Never    Sexual activity: Not Currently     Partners: Male   Other Topics Concern    Caffeine Concern Yes     Comment: occ pop    Exercise Yes     Comment: 3x/week or daily walking     Seat Belt Yes     Allergies:  Allergies   Allergen Reactions    Penicillins ITCHING     Current Outpatient Medications   Medication Sig Dispense Refill    traZODone 50 MG Oral Tab Take 0.5-1 tablets (25-50 mg total) by mouth nightly as needed for Sleep. 30 tablet 0    Calcium Carbonate (CALCIUM 600 OR) Take 600 mg by mouth daily.      Multiple Vitamins-Minerals (MULTI-VITAMIN/MINERALS) Oral Tab Take 1 tablet by mouth daily.      NON FORMULARY Take 2 tablets by mouth daily. MENOQUIL MENOPAUSE      Cholecalciferol (VITAMIN D) 50 MCG (2000 UT) Oral Cap Take 1 capsule (2,000 Units total) by mouth daily.      Nystatin 783528 UNIT/GM External Powder Apply 1 Application topically daily.      hydrocortisone 2.5 % External Cream Use on AA QD-BID prn 30 g 2    ketoconazole 2 % External Cream Apply to AA QD-BID prn. 60 g 2         REVIEW OF SYSTEMS:   Review of Systems   Constitutional:  Negative for fever.   HENT:  Negative for congestion.    Eyes:  Negative for visual disturbance.   Respiratory:  Negative for shortness of breath.    Cardiovascular:  Negative for chest pain.   Gastrointestinal:  Negative for constipation.   Genitourinary:  Negative for dysuria.   Neurological: Negative.    Hematological: Negative.    Psychiatric/Behavioral: Negative.          EXAM:   /80   Pulse 86   Temp 98.3 °F (36.8 °C) (Temporal)   Resp 16   Ht 5' 5.25\" (1.657 m)   Wt 193 lb 6.4 oz (87.7 kg)   LMP 08/31/2022 (Within Days)   SpO2 96%   BMI 31.94 kg/m²  Estimated body mass index is 31.94 kg/m² as calculated from the following:    Height as of this encounter: 5' 5.25\" (1.657 m).    Weight as of this encounter: 193 lb 6.4 oz  (87.7 kg).   Vital signs reviewed. Appears stated age, well groomed.  Physical Exam  Vitals reviewed.   Constitutional:       General: She is not in acute distress.     Appearance: She is well-developed.   HENT:      Head: Normocephalic and atraumatic.      Right Ear: Tympanic membrane normal.      Left Ear: Tympanic membrane normal.   Eyes:      Conjunctiva/sclera: Conjunctivae normal.   Cardiovascular:      Rate and Rhythm: Normal rate and regular rhythm.      Heart sounds: Normal heart sounds.   Pulmonary:      Effort: Pulmonary effort is normal.      Breath sounds: Normal breath sounds.   Musculoskeletal:      Cervical back: Neck supple.      Right lower leg: No edema.      Left lower leg: No edema.   Lymphadenopathy:      Cervical: No cervical adenopathy.   Skin:     General: Skin is warm and dry.   Neurological:      General: No focal deficit present.      Mental Status: She is alert.   Psychiatric:         Mood and Affect: Mood normal.          ASSESSMENT AND PLAN:   Anna Palacios is a 53 year old female with  1. Encounter for routine adult medical examination    2. Screening mammogram for breast cancer    3. Laboratory exam ordered as part of routine general medical examination    4. Insomnia, unspecified type    5. Hypovitaminosis D    6. Claudication (HCC)          The plan is as follows  Anna was seen today for physical.    Diagnoses and all orders for this visit:    Encounter for routine adult medical examination  -due for mammo  Blood work  -flu and covid vaccine this fall   -colonoscopy due in 2025  -pap smear up to date; per gyne    Screening mammogram for breast cancer  -     [facility] NAOMY 2D+3D SCREENING BILAT (CPT=77067/76072); Future    Laboratory exam ordered as part of routine general medical examination  -     CBC With Differential With Platelet  -     Comp Metabolic Panel (14)  -     Lipid Panel  -     TSH W Reflex To Free T4    Insomnia, unspecified type - she notes difficulty falling asleep since  menopause. She went to sleep at 3am this morning due to insomnia. Discussed non-medication strategies such as getting out of bed if she can't fall asleep after 20 min and doing a quiet activity and then getting into bed when she is feeling drowsy. Exercise can help.   -start trazodone 25mg nightly as needed and take 15 min prior to intended sleep     Hypovitaminosis D -low in past; recheck   -     Vitamin D [E]; Future    Claudication (HCC) -legs feel uncomfortable and \"tired\" after walking short distances. Will check ALEXSANDER.   -     US ANKLE BRACHIAL INDEX (CPT=93922); Future    Hot flashes - recommend discussing further with gynecology. Discussed option of brisdelle but would not prescribe in the setting of trazodone due both affect serotonin. She wants to try increasing the dose of otc supplement she is taking as she is not yet on max dose to see if this helps. The hot flashes are not waking her up from sleep.    Other orders  -     traZODone 50 MG Oral Tab; Take 0.5-1 tablets (25-50 mg total) by mouth nightly as needed for Sleep.        Orders Placed This Encounter   Procedures    CBC With Differential With Platelet    Comp Metabolic Panel (14)    Lipid Panel    TSH W Reflex To Free T4    Vitamin D [E]       Meds & Refills for this Visit:  Requested Prescriptions     Signed Prescriptions Disp Refills    traZODone 50 MG Oral Tab 30 tablet 0     Sig: Take 0.5-1 tablets (25-50 mg total) by mouth nightly as needed for Sleep.       Imaging & Consults:  [facility] NAOMY 2D+3D SCREENING BILAT (CPT=77067/29912)  US ANKLE BRACHIAL INDEX (CPT=93922)    Health Maintenance Due   Topic Date Due    HTN: BP Follow-Up  06/08/2023    COVID-19 Vaccine (5 - 2023-24 season) 09/01/2023    Annual Depression Screening  01/01/2024    Annual Physical  02/27/2024    Mammogram  03/07/2024     Patient/Caregiver Education: Patient/Caregiver Education: There are no barriers to learning. Medical education done. Outcome: Patient verbalizes understanding.  Patient is notified to call with any questions, complications, allergies, or worsening or changing symptoms.  Patient is to call with any side effects or complications from the treatments as a result of today.     Katty Blanton MD

## 2024-03-13 NOTE — PATIENT INSTRUCTIONS
Trazodone is for insomnia. Take 15 min prior to intended sleep     Blood work       Check with your insurance about coverage for Wegovy, Zepbound, and Contrave. Follow up for dedicated weight loss appointment       Call to schedule ankle brachial index test [phone]    Call to schedule mammogram

## 2024-03-15 ENCOUNTER — LAB ENCOUNTER (OUTPATIENT)
Dept: LAB | Age: 54
End: 2024-03-15
Attending: INTERNAL MEDICINE
Payer: COMMERCIAL

## 2024-03-15 DIAGNOSIS — E55.9 HYPOVITAMINOSIS D: ICD-10-CM

## 2024-03-15 LAB
ALBUMIN SERPL-MCNC: 3.7 G/DL (ref 3.4–5)
ALBUMIN/GLOB SERPL: 0.8 {RATIO} (ref 1–2)
ALP LIVER SERPL-CCNC: 127 U/L
ALT SERPL-CCNC: 43 U/L
ANION GAP SERPL CALC-SCNC: 3 MMOL/L (ref 0–18)
AST SERPL-CCNC: 21 U/L (ref 15–37)
BASOPHILS # BLD AUTO: 0.04 X10(3) UL (ref 0–0.2)
BASOPHILS NFR BLD AUTO: 0.6 %
BILIRUB SERPL-MCNC: 0.4 MG/DL (ref 0.1–2)
BUN BLD-MCNC: 16 MG/DL (ref 9–23)
CALCIUM BLD-MCNC: 10.2 MG/DL (ref 8.5–10.1)
CHLORIDE SERPL-SCNC: 107 MMOL/L (ref 98–112)
CHOLEST SERPL-MCNC: 232 MG/DL (ref ?–200)
CO2 SERPL-SCNC: 28 MMOL/L (ref 21–32)
CREAT BLD-MCNC: 0.94 MG/DL
EGFRCR SERPLBLD CKD-EPI 2021: 73 ML/MIN/1.73M2 (ref 60–?)
EOSINOPHIL # BLD AUTO: 0.22 X10(3) UL (ref 0–0.7)
EOSINOPHIL NFR BLD AUTO: 3.5 %
ERYTHROCYTE [DISTWIDTH] IN BLOOD BY AUTOMATED COUNT: 11.9 %
FASTING PATIENT LIPID ANSWER: YES
FASTING STATUS PATIENT QL REPORTED: YES
GLOBULIN PLAS-MCNC: 4.4 G/DL (ref 2.8–4.4)
GLUCOSE BLD-MCNC: 93 MG/DL (ref 70–99)
HCT VFR BLD AUTO: 43.5 %
HDLC SERPL-MCNC: 48 MG/DL (ref 40–59)
HGB BLD-MCNC: 14 G/DL
IMM GRANULOCYTES # BLD AUTO: 0.02 X10(3) UL (ref 0–1)
IMM GRANULOCYTES NFR BLD: 0.3 %
LDLC SERPL CALC-MCNC: 152 MG/DL (ref ?–100)
LYMPHOCYTES # BLD AUTO: 2.12 X10(3) UL (ref 1–4)
LYMPHOCYTES NFR BLD AUTO: 34.1 %
MCH RBC QN AUTO: 29.3 PG (ref 26–34)
MCHC RBC AUTO-ENTMCNC: 32.2 G/DL (ref 31–37)
MCV RBC AUTO: 91 FL
MONOCYTES # BLD AUTO: 0.63 X10(3) UL (ref 0.1–1)
MONOCYTES NFR BLD AUTO: 10.1 %
NEUTROPHILS # BLD AUTO: 3.18 X10 (3) UL (ref 1.5–7.7)
NEUTROPHILS # BLD AUTO: 3.18 X10(3) UL (ref 1.5–7.7)
NEUTROPHILS NFR BLD AUTO: 51.4 %
NONHDLC SERPL-MCNC: 184 MG/DL (ref ?–130)
OSMOLALITY SERPL CALC.SUM OF ELEC: 287 MOSM/KG (ref 275–295)
PLATELET # BLD AUTO: 339 10(3)UL (ref 150–450)
POTASSIUM SERPL-SCNC: 4.8 MMOL/L (ref 3.5–5.1)
PROT SERPL-MCNC: 8.1 G/DL (ref 6.4–8.2)
RBC # BLD AUTO: 4.78 X10(6)UL
SODIUM SERPL-SCNC: 138 MMOL/L (ref 136–145)
TRIGL SERPL-MCNC: 175 MG/DL (ref 30–149)
TSI SER-ACNC: 0.68 MIU/ML (ref 0.36–3.74)
VIT D+METAB SERPL-MCNC: 49 NG/ML (ref 30–100)
VLDLC SERPL CALC-MCNC: 34 MG/DL (ref 0–30)
WBC # BLD AUTO: 6.2 X10(3) UL (ref 4–11)

## 2024-03-15 PROCEDURE — 84443 ASSAY THYROID STIM HORMONE: CPT | Performed by: INTERNAL MEDICINE

## 2024-03-15 PROCEDURE — 82306 VITAMIN D 25 HYDROXY: CPT

## 2024-03-15 PROCEDURE — 36415 COLL VENOUS BLD VENIPUNCTURE: CPT

## 2024-03-15 PROCEDURE — 85025 COMPLETE CBC W/AUTO DIFF WBC: CPT | Performed by: INTERNAL MEDICINE

## 2024-03-15 PROCEDURE — 80053 COMPREHEN METABOLIC PANEL: CPT | Performed by: INTERNAL MEDICINE

## 2024-03-15 PROCEDURE — 80061 LIPID PANEL: CPT | Performed by: INTERNAL MEDICINE

## 2024-03-20 ENCOUNTER — HOSPITAL ENCOUNTER (OUTPATIENT)
Dept: MAMMOGRAPHY | Age: 54
Discharge: HOME | End: 2024-03-20
Attending: INTERNAL MEDICINE
Payer: COMMERCIAL

## 2024-03-20 DIAGNOSIS — Z12.31 SCREENING MAMMOGRAM FOR BREAST CANCER: ICD-10-CM

## 2024-03-20 PROCEDURE — 77067 SCR MAMMO BI INCL CAD: CPT | Performed by: INTERNAL MEDICINE

## 2024-03-20 PROCEDURE — 77063 BREAST TOMOSYNTHESIS BI: CPT | Performed by: INTERNAL MEDICINE

## 2024-04-02 ENCOUNTER — HOSPITAL ENCOUNTER (OUTPATIENT)
Dept: MAMMOGRAPHY | Age: 54
Discharge: HOME | End: 2024-04-02
Attending: INTERNAL MEDICINE
Payer: COMMERCIAL

## 2024-04-02 ENCOUNTER — HOSPITAL ENCOUNTER (OUTPATIENT)
Dept: ULTRASOUND IMAGING | Age: 54
Discharge: HOME | End: 2024-04-02
Attending: INTERNAL MEDICINE
Payer: COMMERCIAL

## 2024-04-02 ENCOUNTER — TELEPHONE (OUTPATIENT)
Dept: INTERNAL MEDICINE CLINIC | Facility: CLINIC | Age: 54
End: 2024-04-02

## 2024-04-02 DIAGNOSIS — R92.2 INCONCLUSIVE MAMMOGRAM: ICD-10-CM

## 2024-04-02 DIAGNOSIS — N63.0 BREAST NODULE: Primary | ICD-10-CM

## 2024-04-02 PROCEDURE — 77061 BREAST TOMOSYNTHESIS UNI: CPT | Performed by: INTERNAL MEDICINE

## 2024-04-02 PROCEDURE — 76642 ULTRASOUND BREAST LIMITED: CPT | Performed by: INTERNAL MEDICINE

## 2024-04-02 PROCEDURE — 77065 DX MAMMO INCL CAD UNI: CPT | Performed by: INTERNAL MEDICINE

## 2024-04-02 NOTE — TELEPHONE ENCOUNTER
LS - order placed, ty!    Incoming call from Samaria in Radiology for pt's additional views and US of left breast. Biopsy is recommended.     RN called LS on the phone with results. VO received to place order for biopsy.     Order placed.

## 2024-04-02 NOTE — IMAGING NOTE
This Breast Care RN assisted Dr. Stromberg with recommendation for a left breast 1 site ultrasound guided biopsy for mass.  Procedure reviewed and all questions answered. Emotional and educational support given. On the day of the biopsy, pt instructed to take Tylenol 1000mg PO, eat a light meal & bring or wear a sports bra.  Post biopsy care also reviewed with pt to include NO lifting more than 5lbs, no exercising or housework (limit upper body movement) for 24-48 hrs post biopsy.  Patient denies blood thinners, bleeding disorders, liver disease, and chemo. Pt verbalized understanding. Our breast center schedulers will be calling to schedule an appt that is convenient for pt.

## 2024-04-12 ENCOUNTER — HOSPITAL ENCOUNTER (OUTPATIENT)
Dept: MAMMOGRAPHY | Facility: HOSPITAL | Age: 54
Discharge: HOME | End: 2024-04-12
Attending: INTERNAL MEDICINE
Payer: COMMERCIAL

## 2024-04-12 DIAGNOSIS — R92.8 ABNORMAL MAMMOGRAM: ICD-10-CM

## 2024-04-12 DIAGNOSIS — N63.0 BREAST NODULE: ICD-10-CM

## 2024-04-12 PROCEDURE — 77065 DX MAMMO INCL CAD UNI: CPT | Performed by: INTERNAL MEDICINE

## 2024-04-12 PROCEDURE — 88342 IMHCHEM/IMCYTCHM 1ST ANTB: CPT | Performed by: INTERNAL MEDICINE

## 2024-04-12 PROCEDURE — 19083 BX BREAST 1ST LESION US IMAG: CPT | Performed by: INTERNAL MEDICINE

## 2024-04-12 PROCEDURE — 88341 IMHCHEM/IMCYTCHM EA ADD ANTB: CPT | Performed by: INTERNAL MEDICINE

## 2024-04-12 PROCEDURE — 88305 TISSUE EXAM BY PATHOLOGIST: CPT | Performed by: INTERNAL MEDICINE

## 2024-04-12 PROCEDURE — 88360 TUMOR IMMUNOHISTOCHEM/MANUAL: CPT | Performed by: INTERNAL MEDICINE

## 2024-04-17 ENCOUNTER — TELEPHONE (OUTPATIENT)
Dept: MAMMOGRAPHY | Facility: HOSPITAL | Age: 54
End: 2024-04-17

## 2024-04-17 NOTE — TELEPHONE ENCOUNTER
Telephoned Anna Palacios and name,  verified with patient. Notified Anna Palacios of left breast positive for IDC biopsy result. Concordance pending. Anna Palacios reports biopsy site is healing well. Radiologist recommends surgical consultation. Dr Blanton's office is referring patient to Dr Davenport. Patient was provided with the contact information for Dr Davenport, the Breast Program Navigators, and myself and informed that one of us will be calling her back with an appt date and time with Dr Davenport.  Pt verbalized understanding and had no further questions at this time.

## 2024-04-17 NOTE — TELEPHONE ENCOUNTER
Spoke to patient and patient accepted an appt with Dr Davenport on Tues, 4-30-24 at 9:45.Address confirmed. Patient verbalized understanding and has no further questions at this time.

## 2024-04-18 ENCOUNTER — TELEPHONE (OUTPATIENT)
Dept: HEMATOLOGY/ONCOLOGY | Facility: HOSPITAL | Age: 54
End: 2024-04-18

## 2024-04-18 NOTE — TELEPHONE ENCOUNTER
Left voicemail for patient to call back, calling to introduce myself as a navigator, new cancer diagnosis.

## 2024-04-19 ENCOUNTER — TELEPHONE (OUTPATIENT)
Dept: HEMATOLOGY/ONCOLOGY | Facility: HOSPITAL | Age: 54
End: 2024-04-19

## 2024-04-19 NOTE — TELEPHONE ENCOUNTER
Spoke to patient over the phone and we discussed newly diagnosed breast cancer. Introduced myself as one of the breast nurse navigators and explained the role of the breast nurse navigator. Explained the role of the physicians on her breast cancer care team. Reviewed over pathology report and discussed the type of breast cancer, grade, and ER/NM and Her 2. Briefly discussed breast cancer treatments including surgery, radiation, hormone therapy, and chemotherapy. Explained the breast cancer multidisciplinary meeting and that her case will be discussed. Patient given my contact information to call with any further questions or concerns.

## 2024-04-22 RX ORDER — TRAZODONE HYDROCHLORIDE 50 MG/1
TABLET ORAL NIGHTLY PRN
Qty: 30 TABLET | Refills: 0 | OUTPATIENT
Start: 2024-04-22

## 2024-04-22 NOTE — TELEPHONE ENCOUNTER
Left message on her cell and home number.     Wanted to check how she is doing with the trazodone new prescription and let her know that I saw the new diagnosis of breast cancer and let her know I am thinking of her.     Ok to talk to RN in triage if I am not in office.

## 2024-04-22 NOTE — TELEPHONE ENCOUNTER
Spoke with patient  She has appointment scheduled with Dr. Davenport on 4/30/24    Insomnia is improved. She did not start the trazodone as she was concerned about indication of treatment for depression.     Menoquil supplement she asks about - we discussed she would stop it 2 weeks prior to surgery. She can discuss with Dr. Davenport if it should be stopped completely. Patient feels like it helps with hot flashes and would like to continue.

## 2024-04-24 ENCOUNTER — HOSPITAL ENCOUNTER (OUTPATIENT)
Dept: ULTRASOUND IMAGING | Age: 54
Discharge: HOME | End: 2024-04-24
Attending: INTERNAL MEDICINE
Payer: COMMERCIAL

## 2024-04-24 DIAGNOSIS — I73.9 CLAUDICATION (HCC): ICD-10-CM

## 2024-04-24 PROCEDURE — 93922 UPR/L XTREMITY ART 2 LEVELS: CPT | Performed by: INTERNAL MEDICINE

## 2024-04-30 ENCOUNTER — OFFICE VISIT (OUTPATIENT)
Dept: SURGERY | Facility: CLINIC | Age: 54
End: 2024-04-30
Payer: COMMERCIAL

## 2024-04-30 ENCOUNTER — NURSE NAVIGATOR ENCOUNTER (OUTPATIENT)
Dept: HEMATOLOGY/ONCOLOGY | Facility: HOSPITAL | Age: 54
End: 2024-04-30

## 2024-04-30 VITALS
SYSTOLIC BLOOD PRESSURE: 143 MMHG | OXYGEN SATURATION: 96 % | WEIGHT: 196 LBS | DIASTOLIC BLOOD PRESSURE: 78 MMHG | RESPIRATION RATE: 18 BRPM | BODY MASS INDEX: 32.65 KG/M2 | TEMPERATURE: 98 F | HEIGHT: 65 IN | HEART RATE: 77 BPM

## 2024-04-30 DIAGNOSIS — C50.912 INVASIVE DUCTAL CARCINOMA OF LEFT BREAST (HCC): Primary | ICD-10-CM

## 2024-04-30 PROCEDURE — 99205 OFFICE O/P NEW HI 60 MIN: CPT | Performed by: SURGERY

## 2024-04-30 NOTE — PATIENT INSTRUCTIONS
Dr. 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    _______________________________________________________________________________    Someone must accompany you the day of the procedure to drive you home safely, because of anesthesia.  You will need an adult  to stay with you the first night following your surgery.  You must remove any kind of makeup, acrylic nails, lotions, powders, creams or deodorant.  [facility] ONLY: Pre-admission will give instruct you on when to take Gatorade and Tylenol/acetaminophen prior to your surgery, purchase 2 - 12oz bottles of regular Gatorade (NOT RED/SUGAR FREE). Otherwise, you may not eat or drink anything else after 11PM the night before surgery.    [place] ONLY: You may not eat or drink anything after midnight the day of your surgery.   Wear comfortable clothing that can be easily removed.  If you wear dentures, contacts lenses, or any prosthesis, you will be asked to remove them.  Do not drink alcohol or smoke 24 hours prior to your procedure.  Bring a picture ID and your insurance card.  Covid-19 testing is no longer required before surgery unless you are experiencing symptoms such as fever, cough, congestion, etc.   The Pre-Admission Testing Department will call the day before to confirm your procedure, give you the time you need to arrive by and directions on where to go. They begin making calls after 2pm, if you are not contacted by 4pm, please  call the surgeon's office listed above.  Do not take any blood thinners at least one week prior to the procedure/surgery. This includes aspirin, baby aspirin, Ibuprofen products, herbal supplements, diet medications, vitamin E, fish oil and green tea supplements. Please check other supplements for these ingredients. *TYLENOL or acetaminophen is acceptable*  If you take Coumadin, Plavix, Xarelto, or Eliquis, please contact your prescribing physician for special instructions on how long to hold. If you take insulin contact your primary care physician for special instructions.  Our surgery scheduler, Katty, will be contacting you to discuss surgery dates. If you have any questions related to scheduling your surgery, please reach out to her at [phone].  _____________________________________________________________________  PRE-OPERATIVE TESTING IF INDICATED BELOW  PLEASE COMPLETE ASAP (AT LEAST 14-21 DAYS PRIOR TO SURGERY)  [] CBC [] BMP [] CMP [] EKG    [] PT, PTT, INR [] Cardiac Clearance  [] H&P Medical Clearance [] Chest X-ray     Please call Central Scheduling to schedule an appointment for pre-operative labs/tests @ [phone]  Does the patient have a pacemaker or ICD?           Does the patient have sleep apnea?  [] Yes   [x] No                               [] Yes   [x] No

## 2024-04-30 NOTE — PROGRESS NOTES
Met with patient and spouse in Dr. Davenport's clinic. Introduced myself as one the breast nurse navigators and explained the role of the breast nurse navigator and coordination of care. Explained the role of all of the physicians involved in her care including the surgeon, medical oncologist, radiation oncologist, and possibly plastic surgeon. Reviewed over the patients pathology report and discussed receptors including ER/PA/ and Her 2. Will contact patient with these results if they are not available. Patient was given the breast cancer treatment handbook and reviewed over how to use this resource. Discussed the breast multidisciplinary conference and that her case was discussed. Patient was given the contact information for the social workers at the [facility] and resources for support as requested. Breast cancer journey map provided to patient and discussed possible Oncotype, if applicable, and other cancer treatments such as chemotherapy and radiation. Provided lumpectomy surgical sheet. Next step in care will be to schedule lumpectomy with [facility].     Pt was provided with breast nurse navigator contact information and was encouraged to phone with any other questions or concerns.

## 2024-04-30 NOTE — PROGRESS NOTES
Breast Surgery New Patient Consultation    This is the first visit for this 53 year old woman, referred by Dr. Urrutia, who presents for evaluation of left breast cancer.    History of Present Illness:   Ms. Anna Palacios is a 53 year old woman who presents with imaging detected left breast cancer.  The patient denies any palpable masses, nipple discharge, skin changes or axillary symptoms.  She does not have any known family history of breast cancer.  She has no personal prior history of breast disease or biopsies.  She presented for screening mammogram on March 7, 2023 and was found to have scattered densities with no suspicious findings.  Her screening mammogram in March 2024 demonstrated nodular asymmetries in the left breast for which additional imaging was recommended.  A left diagnostic evaluation on April 2, 2024 confirmed a 1.5 cm nodule at 3:00, 6 cm from the nipple for which biopsy was recommended.  She was noted to otherwise have a benign and incidental cyst and was noted to have normal-appearing axillary lymph nodes.  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 She is here today for evaluation and recommendations for further therapy.        Past Medical History:    DUB (dysfunctional uterine bleeding)    Fatigue    more often through menopause    Flatulence/gas pain/belching    once in awhile not consistently    Headache disorder    sometimes    High cholesterol    Night sweats    since began premenopause    Sleep disturbance    some insomnia lately due to menopause    Visual impairment    reading glasses    Wears glasses    only for reading    Weight gain    through menopause       Past Surgical History:   Procedure Laterality Date    Colonoscopy  5-18-23    D & c  04/21/2022    Endometrial biopsy  - jar(s): 2  2022    Hysteroscopy  2022    [place] teeth removed         Gynecological History:  Pt is a   Pt was 28 years old at time of first pregnancy.    She has cumulative breastfeeding history of 10 months.  She achieved menarche at age 14 and LMP age 52, pt underwent ablation   She denies any history of hormone replacement therapy   She has history of oral contraceptive use for 1 years, last in .  She denies infertility treatment to achieve pregnancy.    Medications:    No outpatient medications have been marked as taking for the 24 encounter (Appointment) with Lucero Davenport MD.       Allergies:    Allergies   Allergen Reactions    Penicillins ITCHING       Family History:   Family History   Problem Relation Age of Onset    Hypertension Mother     Cancer Father 74        psa and lungs    Prostate Cancer Father     Other (lung cancer) Paternal Uncle     Other (esophagus cancer) Paternal Cousin        She is not of Ashkenazi [place] ancestry.    Social History:  History   Alcohol Use Never       History   Smoking Status    Never   Smokeless Tobacco    Never     Ms. Anna Palacios is  with 2 children. She has 2 siblings. She is currently Homemaker      Review of Systems:  General:   The patient denies, fever, chills, +night sweats, +fatigue, generalized weakness, change in appetite or weight loss.    HEENT:     The patient denies eye irritation, cataracts, redness, glaucoma, yellowing of the eyes, change in vision, color blindness, or +wearing contacts/glasses. The patient denies hearing loss, ringing in the ears, ear drainage, earaches, nasal congestion, nose bleeds, snoring, pain in mouth/throat, hoarseness, change in voice, facial trauma.    Respiratory:  The patient denies chronic cough, phlegm, hemoptysis, pleurisy/chest pain, pneumonia, asthma, wheezing, difficulty in breathing with exertion, emphysema, chronic bronchitis, shortness of breath or abnormal sound when  breathing.     Cardiovascular:  There is no history of chest pain, chest pressure/discomfort, palpitations, irregular heartbeat, fainting or near-fainting, difficulty breathing when lying flat, SOB/Coughing at night, swelling of the legs or chest pain while walking.    Breasts:  See history of present illness    Gastrointestinal:     There is no history of difficulty or pain with swallowing, reflux symptoms, vomiting, dark or bloody stools, constipation, yellowing of the skin, indigestion, nausea, change in bowel habits, diarrhea, abdominal pain or vomiting blood.     Genitourinary:  The patient denies frequent urination, needing to get up at night to urinate, urinary hesitancy or retaining urine, painful urination, urinary incontinence, decreased urine stream, blood in the urine or vaginal/penile discharge.    Skin:    The patient denies rash, itching, skin lesions, dry skin, change in skin color or change in moles.     Hematologic/Lymphatic:  The patient denies easily bruising or bleeding or persistent swollen glands or lymph nodes.     Musculoskeletal:  The patient denies muscle aches/pain, joint pain, stiff joints, neck pain, back pain or bone pain.    Neuropsychiatric:  There is no history of migraines or severe headaches, seizure/epilepsy, speech problems, coordination problems, trembling/tremors, fainting/black outs, dizziness, memory problems, loss of sensation/numbness, problems walking, weakness, tingling or burning in hands/feet. There is no history of abusive relationship, bipolar disorder, +sleep disturbance, anxiety, depression or feeling of despair.    Endocrine:    There is no history of poor/slow wound healing, weight loss/gain, fertility or hormone problems, cold intolerance, thyroid disease.     Allergic/Immunologic:  There is no history of hives, hay fever, angioedema or anaphylaxis.    LMP 08/31/2022 (Within Days)     Physical Exam:  The patient is an alert, oriented, well-nourished and   well-developed woman who appears her stated age. Her speech patterns and movements are normal. Her affect is appropriate.    HEENT: The head is normocephalic. The neck is supple. The thyroid is not enlarged and is without palpable masses/nodules. There are no palpable masses. The trachea is in the midline. Conjunctiva are clear, non-icteric.    Chest: The chest expands symmetrically. The lungs are clear to auscultation.    Heart: The rhythm is regular.  There are no murmurs, rubs, gallops or thrills.    Breasts:  Her breasts are symmetrical with a cup size 38B.  Right breast: The skin, nipple ,and areola appear normal. There is no skin dimpling with movement of the pectoralis. There is no nipple retraction. No nipple discharge can be elicited. The parenchyma is mildly nodular. There are no dominant masses in the breast. The axillary tail is normal.  Left breast:   The skin, nipple, and areola appear normal. There is no skin dimpling with movement of the pectoralis. There is no nipple retraction. No nipple discharge can be elicited. The parenchyma is mildly nodular. There are no dominant masses in the breast. The axillary tail is normal.    Abdomen:  The abdomen is soft, flat and non tender. The liver is not enlarged. There are no palpable masses.    Lymph Nodes:  The supraclavicular, axillary and cervical regions are free of significant lymphadenopathy.    Back: There is no vertebral column tenderness.    Skin: The skin appears normal. There are no suspicious appearing rashes or lesions.    Extremities: The extremities are without deformity, cyanosis or edema.    Impression:   Ms. Anna Palacios is a 53 year old woman presents with left breast cancer, clinical stage T1 NX MX.    Discussion and Plan:  I had a discussion with the Patient regarding her breast exam. On exam today I found her to be healing well since recent biopsy with no other clinical findings.  I personally reviewed the recent imaging and pathology and  we discussed this at length.    The natural history and evolution of breast cancer were discussed with Ms. Anna Palacios and her  family, including the difference between in-situ and invasive carcinoma, and the distinction  between local and systemic disease and local and systemic therapy. For local treatment options,  I explained the risks and benefits of breast conservation and mastectomy (with or without  reconstruction), including the fact that survival rates are equal with these two approaches.  If breast conservation is elected, I explained the need for free margins, the possibility of re-  excision to achieve free margins, and the need for post-operative radiotherapy. The approach  to george staging was also described, including the technique, risks and benefits of sentinel node  biopsy, the possible need for axillary dissection, and the long-term sequelae of this procedure.  With regard to systemic therapy, final recommendation will be made following receipt of final  pathology post-op, but I outlined the possibility of endocrine therapy, chemotherapy, and  herceptin, depending on tumor marker profile.  Following this discussion, where all of the patient's questions were answered, we agreed to  proceed with left breast wire localized lumpectomy with left sentinel lymph node biopsy. The risks and possible complications of the procedure were explained to the patient and her family and she understood and agreed to the proposed plan. She was given ample opportunity for questions and those questions were answered to her satisfaction. She has been  encouraged to contact the office with any questions or concerns prior to her next appointment.     This note was created by Dragon voice recognition. Errors in content may be related to improper recognition by the system; efforts to review and correct have been done but errors may still exist. Please be advised the primary purpose of this note is for me to communicate  medical care. Standard sentence structure is not always used. Medical terminology and medical abbreviations may be used. There may be grammatical, typographical, and automated fill ins that may have errors missed in proofreading.

## 2024-05-01 ENCOUNTER — TELEPHONE (OUTPATIENT)
Dept: SURGERY | Facility: CLINIC | Age: 54
End: 2024-05-01

## 2024-05-01 DIAGNOSIS — C50.912 INVASIVE DUCTAL CARCINOMA OF LEFT BREAST (HCC): Primary | ICD-10-CM

## 2024-05-01 NOTE — TELEPHONE ENCOUNTER
Called patient and spoke about possible surgery dates and told her we will call her back with a date

## 2024-05-03 ENCOUNTER — TELEPHONE (OUTPATIENT)
Dept: MAMMOGRAPHY | Facility: HOSPITAL | Age: 54
End: 2024-05-03

## 2024-05-03 NOTE — TELEPHONE ENCOUNTER
Spoke with Anna Palacios regarding [place] Lymph Node mapping to be done in nuclear medicine department before lumpectomy surgery scheduled for 5-16-24 with Dr Davenport. Also reviewed wire localization to be done in [facility]. Both procedures explained and all questions answered.  Pt to be transported to each department by  through [facility]. Will make every effort to ensure privacy and safety when transported between departments. Breast Care Coordinator to provide education and written information regarding lumpectomy surgery, breast cancer and lymphedema. Pt verbalized understanding and had no further questions at this time.

## 2024-05-03 NOTE — TELEPHONE ENCOUNTER
Attempted to reach patient regarding sentinel lymph node mapping and localization procedures education. Message left to call back.

## 2024-05-16 ENCOUNTER — ANESTHESIA EVENT (OUTPATIENT)
Dept: SURGERY | Facility: HOSPITAL | Age: 54
End: 2024-05-16

## 2024-05-16 ENCOUNTER — HOSPITAL ENCOUNTER (OUTPATIENT)
Dept: NUCLEAR MEDICINE | Facility: HOSPITAL | Age: 54
Discharge: HOME | End: 2024-05-16
Attending: SURGERY | Admitting: SURGERY

## 2024-05-16 ENCOUNTER — HOSPITAL ENCOUNTER (OUTPATIENT)
Facility: HOSPITAL | Age: 54
Setting detail: HOSPITAL OUTPATIENT SURGERY
Discharge: HOME | End: 2024-05-16
Attending: SURGERY | Admitting: SURGERY

## 2024-05-16 ENCOUNTER — HOSPITAL ENCOUNTER (OUTPATIENT)
Dept: MAMMOGRAPHY | Facility: HOSPITAL | Age: 54
Discharge: HOME | End: 2024-05-16
Attending: SURGERY | Admitting: SURGERY

## 2024-05-16 ENCOUNTER — ANESTHESIA (OUTPATIENT)
Dept: SURGERY | Facility: HOSPITAL | Age: 54
End: 2024-05-16

## 2024-05-16 VITALS
OXYGEN SATURATION: 98 % | SYSTOLIC BLOOD PRESSURE: 132 MMHG | TEMPERATURE: 98 F | DIASTOLIC BLOOD PRESSURE: 80 MMHG | HEART RATE: 61 BPM | HEIGHT: 65 IN | BODY MASS INDEX: 31.99 KG/M2 | WEIGHT: 192 LBS | RESPIRATION RATE: 18 BRPM

## 2024-05-16 DIAGNOSIS — C50.912 INVASIVE DUCTAL CARCINOMA OF LEFT BREAST (HCC): ICD-10-CM

## 2024-05-16 DIAGNOSIS — C50.912 INVASIVE DUCTAL CARCINOMA OF LEFT BREAST (HCC): Primary | ICD-10-CM

## 2024-05-16 LAB — B-HCG UR QL: NEGATIVE

## 2024-05-16 PROCEDURE — 88305 TISSUE EXAM BY PATHOLOGIST: CPT | Performed by: SURGERY

## 2024-05-16 PROCEDURE — 81025 URINE PREGNANCY TEST: CPT

## 2024-05-16 PROCEDURE — 88307 TISSUE EXAM BY PATHOLOGIST: CPT | Performed by: SURGERY

## 2024-05-16 PROCEDURE — 0HBU0ZZ EXCISION OF LEFT BREAST, OPEN APPROACH: ICD-10-PCS | Performed by: SURGERY

## 2024-05-16 PROCEDURE — 07B60ZX EXCISION OF LEFT AXILLARY LYMPHATIC, OPEN APPROACH, DIAGNOSTIC: ICD-10-PCS | Performed by: SURGERY

## 2024-05-16 PROCEDURE — 78195 LYMPH SYSTEM IMAGING: CPT | Performed by: SURGERY

## 2024-05-16 PROCEDURE — 19281 PERQ DEVICE BREAST 1ST IMAG: CPT | Performed by: SURGERY

## 2024-05-16 RX ORDER — PROCHLORPERAZINE EDISYLATE 5 MG/ML
5 INJECTION INTRAMUSCULAR; INTRAVENOUS EVERY 8 HOURS PRN
Status: DISCONTINUED | OUTPATIENT
Start: 2024-05-16 | End: 2024-05-16

## 2024-05-16 RX ORDER — SODIUM CHLORIDE 9 MG/ML
INJECTION, SOLUTION INTRAMUSCULAR; INTRAVENOUS; SUBCUTANEOUS AS NEEDED
Status: DISCONTINUED | OUTPATIENT
Start: 2024-05-16 | End: 2024-05-16 | Stop reason: HOSPADM

## 2024-05-16 RX ORDER — SCOLOPAMINE TRANSDERMAL SYSTEM 1 MG/1
1 PATCH, EXTENDED RELEASE TRANSDERMAL ONCE
Status: DISCONTINUED | OUTPATIENT
Start: 2024-05-16 | End: 2024-05-16 | Stop reason: HOSPADM

## 2024-05-16 RX ORDER — LIDOCAINE AND PRILOCAINE 25; 25 MG/G; MG/G
CREAM TOPICAL ONCE
Status: COMPLETED | OUTPATIENT
Start: 2024-05-16 | End: 2024-05-16

## 2024-05-16 RX ORDER — HYDROMORPHONE HYDROCHLORIDE 1 MG/ML
INJECTION, SOLUTION INTRAMUSCULAR; INTRAVENOUS; SUBCUTANEOUS
Status: COMPLETED
Start: 2024-05-16 | End: 2024-05-16

## 2024-05-16 RX ORDER — SODIUM CHLORIDE, SODIUM LACTATE, POTASSIUM CHLORIDE, CALCIUM CHLORIDE 600; 310; 30; 20 MG/100ML; MG/100ML; MG/100ML; MG/100ML
INJECTION, SOLUTION INTRAVENOUS CONTINUOUS
Status: DISCONTINUED | OUTPATIENT
Start: 2024-05-16 | End: 2024-05-16

## 2024-05-16 RX ORDER — DEXAMETHASONE SODIUM PHOSPHATE 4 MG/ML
VIAL (ML) INJECTION AS NEEDED
Status: DISCONTINUED | OUTPATIENT
Start: 2024-05-16 | End: 2024-05-16 | Stop reason: SURG

## 2024-05-16 RX ORDER — KETOROLAC TROMETHAMINE 30 MG/ML
INJECTION, SOLUTION INTRAMUSCULAR; INTRAVENOUS AS NEEDED
Status: DISCONTINUED | OUTPATIENT
Start: 2024-05-16 | End: 2024-05-16 | Stop reason: SURG

## 2024-05-16 RX ORDER — PHENYLEPHRINE HCL 10 MG/ML
VIAL (ML) INJECTION AS NEEDED
Status: DISCONTINUED | OUTPATIENT
Start: 2024-05-16 | End: 2024-05-16 | Stop reason: SURG

## 2024-05-16 RX ORDER — LIDOCAINE HYDROCHLORIDE 10 MG/ML
INJECTION, SOLUTION EPIDURAL; INFILTRATION; INTRACAUDAL; PERINEURAL AS NEEDED
Status: DISCONTINUED | OUTPATIENT
Start: 2024-05-16 | End: 2024-05-16 | Stop reason: SURG

## 2024-05-16 RX ORDER — ACETAMINOPHEN 500 MG
1000 TABLET ORAL ONCE AS NEEDED
Status: COMPLETED | OUTPATIENT
Start: 2024-05-16 | End: 2024-05-16

## 2024-05-16 RX ORDER — HYDROMORPHONE HYDROCHLORIDE 1 MG/ML
0.4 INJECTION, SOLUTION INTRAMUSCULAR; INTRAVENOUS; SUBCUTANEOUS EVERY 5 MIN PRN
Status: DISCONTINUED | OUTPATIENT
Start: 2024-05-16 | End: 2024-05-16

## 2024-05-16 RX ORDER — DIAZEPAM 5 MG/1
5 TABLET ORAL AS NEEDED
Status: DISCONTINUED | OUTPATIENT
Start: 2024-05-16 | End: 2024-05-16 | Stop reason: HOSPADM

## 2024-05-16 RX ORDER — HYDROCODONE BITARTRATE AND ACETAMINOPHEN 5; 325 MG/1; MG/1
1-2 TABLET ORAL EVERY 6 HOURS PRN
Qty: 20 TABLET | Refills: 0 | Status: SHIPPED | OUTPATIENT
Start: 2024-05-16

## 2024-05-16 RX ORDER — CEFAZOLIN SODIUM 1 G/3ML
INJECTION, POWDER, FOR SOLUTION INTRAMUSCULAR; INTRAVENOUS AS NEEDED
Status: DISCONTINUED | OUTPATIENT
Start: 2024-05-16 | End: 2024-05-16 | Stop reason: SURG

## 2024-05-16 RX ORDER — MIDAZOLAM HYDROCHLORIDE 1 MG/ML
INJECTION INTRAMUSCULAR; INTRAVENOUS AS NEEDED
Status: DISCONTINUED | OUTPATIENT
Start: 2024-05-16 | End: 2024-05-16 | Stop reason: SURG

## 2024-05-16 RX ORDER — NALOXONE HYDROCHLORIDE 0.4 MG/ML
0.08 INJECTION, SOLUTION INTRAMUSCULAR; INTRAVENOUS; SUBCUTANEOUS AS NEEDED
Status: DISCONTINUED | OUTPATIENT
Start: 2024-05-16 | End: 2024-05-16

## 2024-05-16 RX ORDER — ACETAMINOPHEN 500 MG
1000 TABLET ORAL ONCE
Status: DISCONTINUED | OUTPATIENT
Start: 2024-05-16 | End: 2024-05-16 | Stop reason: HOSPADM

## 2024-05-16 RX ORDER — HYDROCODONE BITARTRATE AND ACETAMINOPHEN 5; 325 MG/1; MG/1
2 TABLET ORAL ONCE AS NEEDED
Status: COMPLETED | OUTPATIENT
Start: 2024-05-16 | End: 2024-05-16

## 2024-05-16 RX ORDER — BUPIVACAINE HYDROCHLORIDE 5 MG/ML
INJECTION, SOLUTION EPIDURAL; INTRACAUDAL AS NEEDED
Status: DISCONTINUED | OUTPATIENT
Start: 2024-05-16 | End: 2024-05-16 | Stop reason: HOSPADM

## 2024-05-16 RX ORDER — ONDANSETRON 2 MG/ML
4 INJECTION INTRAMUSCULAR; INTRAVENOUS EVERY 6 HOURS PRN
Status: DISCONTINUED | OUTPATIENT
Start: 2024-05-16 | End: 2024-05-16

## 2024-05-16 RX ORDER — LIDOCAINE HYDROCHLORIDE AND EPINEPHRINE 10; 10 MG/ML; UG/ML
INJECTION, SOLUTION INFILTRATION; PERINEURAL AS NEEDED
Status: DISCONTINUED | OUTPATIENT
Start: 2024-05-16 | End: 2024-05-16 | Stop reason: HOSPADM

## 2024-05-16 RX ORDER — HYDROMORPHONE HYDROCHLORIDE 1 MG/ML
0.6 INJECTION, SOLUTION INTRAMUSCULAR; INTRAVENOUS; SUBCUTANEOUS EVERY 5 MIN PRN
Status: DISCONTINUED | OUTPATIENT
Start: 2024-05-16 | End: 2024-05-16

## 2024-05-16 RX ORDER — HYDROMORPHONE HYDROCHLORIDE 1 MG/ML
0.2 INJECTION, SOLUTION INTRAMUSCULAR; INTRAVENOUS; SUBCUTANEOUS EVERY 5 MIN PRN
Status: DISCONTINUED | OUTPATIENT
Start: 2024-05-16 | End: 2024-05-16

## 2024-05-16 RX ORDER — HYDROCODONE BITARTRATE AND ACETAMINOPHEN 5; 325 MG/1; MG/1
1 TABLET ORAL ONCE AS NEEDED
Status: COMPLETED | OUTPATIENT
Start: 2024-05-16 | End: 2024-05-16

## 2024-05-16 RX ORDER — ONDANSETRON 2 MG/ML
INJECTION INTRAMUSCULAR; INTRAVENOUS AS NEEDED
Status: DISCONTINUED | OUTPATIENT
Start: 2024-05-16 | End: 2024-05-16 | Stop reason: SURG

## 2024-05-16 RX ADMIN — PHENYLEPHRINE HCL 100 MCG: 10 MG/ML VIAL (ML) INJECTION at 15:38:00

## 2024-05-16 RX ADMIN — PHENYLEPHRINE HCL 100 MCG: 10 MG/ML VIAL (ML) INJECTION at 15:47:00

## 2024-05-16 RX ADMIN — ONDANSETRON 4 MG: 2 INJECTION INTRAMUSCULAR; INTRAVENOUS at 16:10:00

## 2024-05-16 RX ADMIN — DEXAMETHASONE SODIUM PHOSPHATE 8 MG: 4 MG/ML VIAL (ML) INJECTION at 15:26:00

## 2024-05-16 RX ADMIN — LIDOCAINE HYDROCHLORIDE 50 MG: 10 INJECTION, SOLUTION EPIDURAL; INFILTRATION; INTRACAUDAL; PERINEURAL at 15:20:00

## 2024-05-16 RX ADMIN — CEFAZOLIN SODIUM 2 G: 1 INJECTION, POWDER, FOR SOLUTION INTRAMUSCULAR; INTRAVENOUS at 15:27:00

## 2024-05-16 RX ADMIN — MIDAZOLAM HYDROCHLORIDE 2 MG: 1 INJECTION INTRAMUSCULAR; INTRAVENOUS at 15:13:00

## 2024-05-16 RX ADMIN — SODIUM CHLORIDE, SODIUM LACTATE, POTASSIUM CHLORIDE, CALCIUM CHLORIDE: 600; 310; 30; 20 INJECTION, SOLUTION INTRAVENOUS at 15:13:00

## 2024-05-16 RX ADMIN — KETOROLAC TROMETHAMINE 30 MG: 30 INJECTION, SOLUTION INTRAMUSCULAR; INTRAVENOUS at 16:10:00

## 2024-05-16 NOTE — BRIEF OP NOTE
Pre-Operative Diagnosis: Invasive ductal carcinoma of left breast (HCC) [C50.912]     Post-Operative Diagnosis: Invasive ductal carcinoma of left breast (HCC) [C50.912]      Procedure Performed:   Left breast wire localized lumpectomy, left lymphoscintigraphy, left sentinel lymph node biopsy    Surgeons and Role:     * Lucero Davenport MD - Primary    Assistant(s):   Narcisa Levi     Surgical Findings: Clip visualized on specimen radiogram, left sentinel lymph node x 2     Specimen: Left breast lumpectomy, left breast margins x 6, left breast sentinel lymph node x 2     Estimated Blood Loss: 5cc    Lucero Davenport MD  5/16/2024  3:23 PM

## 2024-05-16 NOTE — ANESTHESIA POSTPROCEDURE EVALUATION
[facility]    Anna Palacios Patient Status:  Hospital Outpatient Surgery   Age/Gender 53 year old female MRN [id number]   Location [facility] SURGERY Attending Lucero Davenport MD   Hosp Day # 0 PCP Katty Blanton MD       Anesthesia Post-op Note    Left breast wire localized lumpectomy, left lymphoscintigraphy, left sentinel lymph node biopsy    Procedure Summary       Date: 05/16/24 Room / Location:  MAIN OR 05 / [facility] MAIN OR    Anesthesia Start: 1513 Anesthesia Stop: 1627    Procedure: Left breast wire localized lumpectomy, left lymphoscintigraphy, left sentinel lymph node biopsy (Left: Breast) Diagnosis:       Invasive ductal carcinoma of left breast (HCC)      (Invasive ductal carcinoma of left breast (HCC) [C50.912])    Surgeons: Lucero Davenport MD Anesthesiologist: Chris Stuart MD    Anesthesia Type: general ASA Status: 2            Anesthesia Type: general    Vitals Value Taken Time   /60 05/16/24 1627   Temp 97.7 °F (36.5 °C) 05/16/24 1627   Pulse 86 05/16/24 1627   Resp 13 05/16/24 1627   SpO2 100 % 05/16/24 1627       Patient Location: PACU    Anesthesia Type: general    Airway Patency: patent    Postop Pain Control: adequate    Mental Status: preanesthetic baseline    Nausea/Vomiting: none    Cardiopulmonary/Hydration status: stable euvolemic    Complications: no apparent anesthesia related complications    Postop vital signs: stable    Dental Exam: Unchanged from Preop    Patient to be discharged from PACU when criteria met.

## 2024-05-16 NOTE — ANESTHESIA PROCEDURE NOTES
Airway  Date/Time: 5/16/2024 3:23 PM  Urgency: elective    Airway not difficult    General Information and Staff    Patient location during procedure: OR  Anesthesiologist: Chris Stuart MD  Performed: anesthesiologist   Performed by: Chris Stuart MD  Authorized by: Chris Stuart MD      Indications and Patient Condition  Indications for airway management: anesthesia  Sedation level: deep  Preoxygenated: yes  Patient position: sniffing  Mask difficulty assessment: 1 - vent by mask    Final Airway Details  Final airway type: supraglottic airway      Successful airway: classic  Size 3       Number of attempts at approach: 1

## 2024-05-16 NOTE — DISCHARGE INSTRUCTIONS
Breast Surgery  Post-operative Instructions   Lumpectomy,  [place] Node Biopsy  Lucero Davenport MD  General Instructions  The following instructions will provide helpful information that will assist your recovery. These are designed to be general guidelines. Please remember that everyone heals and recovers differently. Listen to your body and rest when you are tired. If you have any questions or concerns, please do not hesitate to contact my office. I would like to see you in the office about one week after surgery, please schedule and appointment through my office to make a post-operative appointment if you do not already have one.     Restrictions  There are no lifting weight restrictions for the arm on the surgical side. You may gradually increase the amount of weight based on your comfort level. You should avoid a lot of repetitious activity with the arm until the wound is well-healed (about two weeks).   You should not drive a car until you believe you can react to an emergency situation and you’re no longer taking narcotic pain medications.   You may shower the day after surgery. You should not bathe or swim (i.e. submerge wound) until the wound is well healed (about two weeks).  There are no dietary restrictions.    Exercise  You may begin arm exercises within a couple days. Do these 2 or 3 times per day, beginning with light exercise and gradually increase your range of motion and repetitions. This will help your arm regain full mobility. We will address your activity level again at your post-operative visit.   You will have pain medication prescribed before discharge. Take this as directed to relieve pain. It is important that you be comfortable so that you may continue your stretching exercises.   If you find the medication prescribed is too strong, try Tylenol (Acetaminophen) or Ibuprofen.    Wound Care  You may remove the gauze dressing on the first or second postoperative day and then shower. You  should leave the steri-strips in place; they will start to peel off about 10 days after your surgery. The stitches are all underneath the skin and will dissolve on their own. You will not need any stitches removed except if you have a drain in place.  I encourage you to shower once the outer bandage is removed, you may use soap and water directly over the steri-strips and pat dry following.  You should keep gauze dressing on the wound until the wound is completely dry and without drainage-usually 1-3 days.   If a surgical bra was placed after the surgery, I encourage you to wear it as much as possible during the week following the procedure (including during sleep). Alternatively, you may choose to wear your own bra provided it is comfortable, provides support and does not have an underwire. If the breast doesn’t move it is less painful.  If an elastic bandage was placed around your chest after the surgery you may remove it on the 1st or 2nd day after surgery. If you prefer to leave it on longer, you may.  It is normal to feel a lump in the area of the incisions for up to 6 months. This is part of the healing process. Eventually the breast will return to its normal condition.   .    Pain Medication  You will be given a prescription for a narcotic pain medication (usually Norco) upon discharge. Many patients have very little pain and don’t want to use the narcotic. Don’t be afraid to use it if you’re uncomfortable. If you’d prefer you may substitute Tylenol or Ibuprofen (Motrin, Advil). You must wait until AFTER 10:10 PM if you take any Advil, Motrin, or Ibuprofen. Always follow all label directions. Using an ice pack for a few minutes over the incision can also alleviate pain. If you do use the narcotic medication, use an over the counter stool softener or gentle laxative and stay well-hydrated as constipation is not uncommon with narcotics.    Pathology Report  The Pathology report is usually available 4-5 business  days following the surgery. I will call you  with the results once the report is available.    Notify my office if:   Your temperature is over 101.5 F   You notice increasing swelling, redness, warmth or drainage from around the incision or drain site.    If you experience any problems please call my office and either my nurse or myself will respond. After hours, you will be forwarded to my answering service which will help you get in touch with myself or the physician covering for me.

## 2024-05-16 NOTE — IMAGING NOTE
Assisted Dr.Nimesh Blackburn with mammography guided needle localization of the left breast.   Anna Palacios identified with spelling of name and date of birth.   Medications and allergies reviewed. The following allergies were reported:   PenicillinsITCHING    History:  left breast-Invasive ductal carcinoma   Surgery: Left breast wire localized lumpectomy, left lymphoscintigraphy, left sentinel lymph node biopsy     Order verified.  Procedure explained and questions answered. Anna Palacios verbalized understanding and agreement.  1334: Written consent obtained.     1335: Scans taken by Juany- mammography technologist    1343: Dr. Yohannes Blackburn  present    Time out complete- RN not present    Site prepped in a sterile manner by the imaging technologist.   1343: Lidocaine administered for anesthetic affect.  1344: Metcalf 20G x 5cm needle placed- Left breast, 3 o'clock position, coil shaped clip, biopsy demonstrates invasive ductal carcinoma     Emotional support provided.  Anna Palacios tolerated procedure well.     Site cleaned.  Wire secured with blue clip, steri strips, sterile 4x4 gauze dressing and Tegaderm.    Anna Palacios transported via wheelchair to pre-op/surgery holding in stable condition. Ms. Palacios without complaints or concerns at this time.

## 2024-05-16 NOTE — H&P
History of Present Illness:   Ms. Anna Palacios is a 53 year old woman who presents with imaging detected left breast cancer.  The patient denies any palpable masses, nipple discharge, skin changes or axillary symptoms.  She does not have any known family history of breast cancer.  She has no personal prior history of breast disease or biopsies.  She presented for screening mammogram on March 7, 2023 and was found to have scattered densities with no suspicious findings.  Her screening mammogram in March 2024 demonstrated nodular asymmetries in the left breast for which additional imaging was recommended.  A left diagnostic evaluation on April 2, 2024 confirmed a 1.5 cm nodule at 3:00, 6 cm from the nipple for which biopsy was recommended.  She was noted to otherwise have a benign and incidental cyst and was noted to have normal-appearing axillary lymph nodes.  She had the left breast ultrasound-guided biopsy on April 12, 2024 and was found to have invasive ductal carcinoma with prominent lymphocytic response that was grade 3 measuring up to 7 mm, ER 15%, SC 15%, HER2/mark negative with a Ki-67 greater than 90%. She is here today for evaluation and recommendations for further therapy.        Past Medical History       Past Medical History:    DUB (dysfunctional uterine bleeding)    Fatigue     more often through menopause    Flatulence/gas pain/belching     once in awhile not consistently    Headache disorder     sometimes    High cholesterol    Night sweats     since began premenopause    Sleep disturbance     some insomnia lately due to menopause    Visual impairment     reading glasses    Wears glasses     only for reading    Weight gain     through menopause            Past Surgical History         Past Surgical History:   Procedure Laterality Date    Colonoscopy   5-18-23    D & c   04/21/2022    Endometrial biopsy - jar(s): 2   04/21/2022    Hysteroscopy   04/21/2022    [place] teeth removed                 Gynecological History:  Pt is a   Pt was 28 years old at time of first pregnancy.    She has cumulative breastfeeding history of 10 months.  She achieved menarche at age 14 and LMP age 52, pt underwent ablation   She denies any history of hormone replacement therapy   She has history of oral contraceptive use for 1 years, last in .  She denies infertility treatment to achieve pregnancy.     Medications:    Medications Ordered Today   No outpatient medications have been marked as taking for the 24 encounter (Appointment) with Lucero Davenport MD.            Allergies:    Allergies        Allergies   Allergen Reactions    Penicillins ITCHING            Family History:   Family History         Family History   Problem Relation Age of Onset    Hypertension Mother      Cancer Father 74         psa and lungs    Prostate Cancer Father      Other (lung cancer) Paternal Uncle      Other (esophagus cancer) Paternal Cousin              She is not of Ashkenazi Tenriism ancestry.     Social History:      History   Alcohol Use Never             History   Smoking Status    Never   Smokeless Tobacco    Never      Ms. Anna Palacios is  with 2 children. She has 2 siblings. She is currently Homemaker        Review of Systems:  General:   The patient denies, fever, chills, +night sweats, +fatigue, generalized weakness, change in appetite or weight loss.     HEENT:     The patient denies eye irritation, cataracts, redness, glaucoma, yellowing of the eyes, change in vision, color blindness, or +wearing contacts/glasses. The patient denies hearing loss, ringing in the ears, ear drainage, earaches, nasal congestion, nose bleeds, snoring, pain in mouth/throat, hoarseness, change in voice, facial trauma.     Respiratory:  The patient denies chronic cough, phlegm, hemoptysis, pleurisy/chest pain, pneumonia, asthma, wheezing, difficulty in breathing with exertion, emphysema, chronic bronchitis, shortness of breath or  abnormal sound when breathing.      Cardiovascular:  There is no history of chest pain, chest pressure/discomfort, palpitations, irregular heartbeat, fainting or near-fainting, difficulty breathing when lying flat, SOB/Coughing at night, swelling of the legs or chest pain while walking.     Breasts:  See history of present illness     Gastrointestinal:     There is no history of difficulty or pain with swallowing, reflux symptoms, vomiting, dark or bloody stools, constipation, yellowing of the skin, indigestion, nausea, change in bowel habits, diarrhea, abdominal pain or vomiting blood.      Genitourinary:  The patient denies frequent urination, needing to get up at night to urinate, urinary hesitancy or retaining urine, painful urination, urinary incontinence, decreased urine stream, blood in the urine or vaginal/penile discharge.     Skin:    The patient denies rash, itching, skin lesions, dry skin, change in skin color or change in moles.      Hematologic/Lymphatic:  The patient denies easily bruising or bleeding or persistent swollen glands or lymph nodes.      Musculoskeletal:  The patient denies muscle aches/pain, joint pain, stiff joints, neck pain, back pain or bone pain.     Neuropsychiatric:  There is no history of migraines or severe headaches, seizure/epilepsy, speech problems, coordination problems, trembling/tremors, fainting/black outs, dizziness, memory problems, loss of sensation/numbness, problems walking, weakness, tingling or burning in hands/feet. There is no history of abusive relationship, bipolar disorder, +sleep disturbance, anxiety, depression or feeling of despair.     Endocrine:    There is no history of poor/slow wound healing, weight loss/gain, fertility or hormone problems, cold intolerance, thyroid disease.      Allergic/Immunologic:  There is no history of hives, hay fever, angioedema or anaphylaxis.     LMP 08/31/2022 (Within Days)      Physical Exam:  The patient is an alert,  oriented, well-nourished and  well-developed woman who appears her stated age. Her speech patterns and movements are normal. Her affect is appropriate.     HEENT: The head is normocephalic. The neck is supple. The thyroid is not enlarged and is without palpable masses/nodules. There are no palpable masses. The trachea is in the midline. Conjunctiva are clear, non-icteric.     Chest: The chest expands symmetrically. The lungs are clear to auscultation.     Heart: The rhythm is regular.  There are no murmurs, rubs, gallops or thrills.     Breasts:  Her breasts are symmetrical with a cup size 38B.  Right breast: The skin, nipple ,and areola appear normal. There is no skin dimpling with movement of the pectoralis. There is no nipple retraction. No nipple discharge can be elicited. The parenchyma is mildly nodular. There are no dominant masses in the breast. The axillary tail is normal.  Left breast:   The skin, nipple, and areola appear normal. There is no skin dimpling with movement of the pectoralis. There is no nipple retraction. No nipple discharge can be elicited. The parenchyma is mildly nodular. There are no dominant masses in the breast. The axillary tail is normal.     Abdomen:  The abdomen is soft, flat and non tender. The liver is not enlarged. There are no palpable masses.     Lymph Nodes:  The supraclavicular, axillary and cervical regions are free of significant lymphadenopathy.     Back: There is no vertebral column tenderness.     Skin: The skin appears normal. There are no suspicious appearing rashes or lesions.     Extremities: The extremities are without deformity, cyanosis or edema.     Impression:   Ms. Anna Palacios is a 53 year old woman presents with left breast cancer, clinical stage T1 NX MX.     Discussion and Plan:  I had a discussion with the Patient regarding her breast exam. On exam today I found her to be healing well since recent biopsy with no other clinical findings.  I personally  reviewed the recent imaging and pathology and we discussed this at length.     The natural history and evolution of breast cancer were discussed with Ms. Anna Palacios and her  family, including the difference between in-situ and invasive carcinoma, and the distinction  between local and systemic disease and local and systemic therapy. For local treatment options,  I explained the risks and benefits of breast conservation and mastectomy (with or without  reconstruction), including the fact that survival rates are equal with these two approaches.  If breast conservation is elected, I explained the need for free margins, the possibility of re-  excision to achieve free margins, and the need for post-operative radiotherapy. The approach  to george staging was also described, including the technique, risks and benefits of sentinel node  biopsy, the possible need for axillary dissection, and the long-term sequelae of this procedure.  With regard to systemic therapy, final recommendation will be made following receipt of final  pathology post-op, but I outlined the possibility of endocrine therapy, chemotherapy, and  herceptin, depending on tumor marker profile.  Following this discussion, where all of the patient's questions were answered, we agreed to  proceed with left breast wire localized lumpectomy with left sentinel lymph node biopsy. The risks and possible complications of the procedure were explained to the patient and her family and she understood and agreed to the proposed plan. She was given ample opportunity for questions and those questions were answered to her satisfaction. She has been  encouraged to contact the office with any questions or concerns prior to her next appointment.     Pre-op Diagnosis: Invasive ductal carcinoma of left breast (HCC) [C50.912]    The above referenced H&P was reviewed by Lucero Davenport MD on 5/16/2024, the patient was examined and no significant changes have occurred in the  patient's condition since the H&P was performed.  I discussed with the patient and/or legal representative the potential benefits, risks and side effects of this procedure; the likelihood of the patient achieving goals; and potential problems that might occur during recuperation.  I discussed reasonable alternatives to the procedure, including risks, benefits and side effects related to the alternatives and risks related to not receiving this procedure.  We will proceed with procedure as planned.

## 2024-05-16 NOTE — ANESTHESIA PREPROCEDURE EVALUATION
PRE-OP EVALUATION    Patient Name: Anna Palacios    Admit Diagnosis: Invasive ductal carcinoma of left breast (HCC) [C50.912]    Pre-op Diagnosis: Invasive ductal carcinoma of left breast (HCC) [C50.912]    Left breast wire localized lumpectomy, left lymphoscintigraphy, left sentinel lymph node biopsy    Anesthesia Procedure: Left breast wire localized lumpectomy, left lymphoscintigraphy, left sentinel lymph node biopsy (Left)    Surgeons and Role:     * Lucero Davenport MD - Primary    Pre-op vitals reviewed.  Temp: 98 °F (36.7 °C)  Pulse: 88  Resp: 16  BP: 150/92  SpO2: 98 %  Body mass index is 31.95 kg/m².    Current medications reviewed.  Hospital Medications:   [Transfer Hold] acetaminophen (Tylenol Extra Strength) tab 1,000 mg  1,000 mg Oral Once    [Transfer Hold] scopolamine (Transderm-Scop) 1 MG/3DAYS patch 1 patch  1 patch Transdermal Once    lactated ringers infusion   Intravenous Continuous    ceFAZolin (Ancef) 2g in 10mL IV syringe premix  2 g Intravenous Once    [Transfer Hold] diazePAM (Valium) tab 5 mg  5 mg Oral PRN    [COMPLETED] lidocaine-prilocaine (Emla) 2.5-2.5 % cream   Topical Once       Outpatient Medications:     Medications Prior to Admission   Medication Sig Dispense Refill Last Dose    Calcium Carbonate (CALCIUM 600 OR) Take 600 mg by mouth daily.   5/15/2024    Multiple Vitamins-Minerals (MULTI-VITAMIN/MINERALS) Oral Tab Take 1 tablet by mouth daily.   5/15/2024    NON FORMULARY Take 2 tablets by mouth daily. MENOQUIL MENOPAUSE   Past Month    Cholecalciferol (VITAMIN D) 50 MCG (2000 UT) Oral Cap Take 1 capsule (2,000 Units total) by mouth daily.   5/15/2024    Nystatin 629473 UNIT/GM External Powder Apply 1 Application topically daily.       hydrocortisone 2.5 % External Cream Use on AA QD-BID prn 30 g 2     ketoconazole 2 % External Cream Apply to AA QD-BID prn. 60 g 2        Allergies: Penicillins      Anesthesia Evaluation        Anesthetic Complications            GI/Hepatic/Renal                                 Cardiovascular                     (+) hyperlipidemia                                  Endo/Other                                  Pulmonary                           Neuro/Psych                              Breast CA        Patient Active Problem List   Diagnosis    Candidal intertrigo    Class 1 obesity due to excess calories without serious comorbidity with body mass index (BMI) of 30.0 to 30.9 in adult    Hot flushes, perimenopausal    Intramural leiomyoma of uterus    Cystocele, midline    Status post hysteroscopic polypectomy    Preop testing    Abnormal uterine bleeding    Menorrhagia with regular cycle    Status post endometrial ablation    Special screening for malignant neoplasm of colon    Benign neoplasm of ascending colon    History of adenomatous polyp of colon    Benign neoplasm of descending colon    Invasive ductal carcinoma of left breast (HCC)        Past Medical History:    Anesthesia complication    gets shakey    Cancer (HCC)    DCIS (ductal carcinoma in situ) of breast    DUB (dysfunctional uterine bleeding)    Fatigue    more often through menopause    Flatulence/gas pain/belching    once in awhile not consistently    Headache disorder    sometimes    High cholesterol    Night sweats    since began premenopause    Sleep disturbance    some insomnia lately due to menopause    Visual impairment    reading glasses    Wears glasses    only for reading    Weight gain    through menopause        Past Surgical History:   Procedure Laterality Date    Colonoscopy  5-18-23    Colonoscopy      D & c  04/21/2022    Dilation/curettage,diagnostic      Endometrial ablation      Endometrial biopsy - jar(s): 2  04/21/2022    Hysteroscopy  04/21/2022    [place] teeth removed       Social History     Socioeconomic History    Marital status:    Tobacco Use    Smoking status: Never     Passive exposure: Never    Smokeless tobacco: Never   Vaping Use    Vaping  status: Never Used   Substance and Sexual Activity    Alcohol use: Never    Drug use: Never    Sexual activity: Not Currently     Partners: Male   Other Topics Concern    Caffeine Concern Yes     Comment: occ pop    Exercise Yes     Comment: 3x/week or daily walking     Seat Belt Yes     History   Drug Use Unknown     Available pre-op labs reviewed.  Lab Results   Component Value Date    WBC 6.2 03/15/2024    RBC 4.78 03/15/2024    HGB 14.0 03/15/2024    HCT 43.5 03/15/2024    MCV 91.0 03/15/2024    MCH 29.3 03/15/2024    MCHC 32.2 03/15/2024    RDW 11.9 03/15/2024    .0 03/15/2024     Lab Results   Component Value Date     03/15/2024    K 4.8 03/15/2024     03/15/2024    CO2 28.0 03/15/2024    BUN 16 03/15/2024    CREATSERUM 0.94 03/15/2024    GLU 93 03/15/2024    CA 10.2 (H) 03/15/2024            Airway      Mallampati: II  Mouth opening: <3 FB  TM distance: 4 - 6 cm  Neck ROM: full Cardiovascular    Cardiovascular exam normal.         Dental  Comment: Dentition appears grossly intact. Patient denies any loose, chipped or missing teeth other than as noted. Risks of dental trauma related to anesthesia including intubation and during emergence explained.             Pulmonary    Pulmonary exam normal.                 Other findings              ASA: 2   Plan: general  NPO status verified and patient meets guidelines.    Post-procedure pain management plan discussed with surgeon and patient.    Comment: Options, risks and benefits of anesthesia as outlined in the anesthesia consent were reviewed with the patient. Risks and benefits of GA including sore throat, allergy, nausea, vomiting, dental trauma, pain management modalities were all discussed. Particularly the risk of dental trauma with weakened teeth or crowns, partials, fillings and any non natural teeth due to instrumentation of oral cavity and airway. Patient understands risks and verbally agreed to proceed. All questions answered.The  consent was signed without further questions.      Plan/risks discussed with: patient and spouse                Present on Admission:  **None**

## 2024-05-17 ENCOUNTER — TELEPHONE (OUTPATIENT)
Dept: HEMATOLOGY/ONCOLOGY | Facility: HOSPITAL | Age: 54
End: 2024-05-17

## 2024-05-17 NOTE — OPERATIVE REPORT
[facility]    PATIENT'S NAME: SASCHA AVILEZ   ATTENDING PHYSICIAN: Lucero Davenport M.D.   OPERATING PHYSICIAN: Lucero Davenport M.D.   PATIENT ACCOUNT#:   [id number]    LOCATION:  [facility] 10  MEDICAL RECORD #:   [id number]       YOB: 1970  ADMISSION DATE:       05/16/2024      OPERATION DATE:  05/16/2024    OPERATIVE REPORT      PREOPERATIVE DIAGNOSIS:  Left breast cancer.  POSTOPERATIVE DIAGNOSIS:  Left breast cancer.  PROCEDURE:  Left breast wire-localized lumpectomy with left breast specimen radiography, left breast advancement flap mastopexy for defect measuring 12 sq cm, left breast injection of blue dye for sentinel lymph node identification and left sentinel lymph node biopsy.    ASSISTANT:  Narcisa Levi CSA.    ANESTHESIA:  General anesthesia and local.    ESTIMATED BLOOD LOSS:  5 mL.    DRAINS:  None.    COMPLICATIONS:  None.    DISPOSITION:  Stable on transfer to recovery room.    INDICATIONS:  The patient is a 53-year-old female who presents with an imaging-detected concern in the left breast and biopsy confirmed invasive carcinoma.  We presented her case at multidisciplinary tumor board, and she was deemed to be a suitable candidate for upfront surgical management.  We discussed local treatment options including the option of breast conservation versus mastectomy.  She is motivated for a breast-conserving approach.  We discussed the need to achieve free margins, the possibility of re-excision to achieve free margins, as well as possible need for postoperative systemic and/or radiation therapy.  The approach to george staging was described including the technique of sentinel biopsy, possible need for axillary dissection, possible long-term sequelae of the procedure, and she agreed to proceed.    OPERATIVE TECHNIQUE:  The patient was brought to the imaging suite.  She had a wire localization of the area of concern in the left breast.  She also underwent  injection of radioisotope as well as lymphoscintigraphy for sentinel lymph node identification preoperatively to the left breast.  She was brought to the OR, placed in supine position, properly padded and secured, given a dose of IV antibiotics, and sequential compression devices were applied to her legs for DVT prophylaxis.  General anesthesia was induced.  Diluted methylene blue dye was injected in a periareolar location and massaged for approximately 5 minutes.  The left breast and arm were prepped and draped in usual sterile fashion.  Then, 1% lidocaine with epinephrine was used to infiltrate the skin and subcutaneous tissues at the targeted incision site.  A transverse incision was made with a 15-blade knife at the inferior aspect of the axillary hairline after confirming uptake with a hand-held gamma counter.  Using sharp dissection and electrocautery, I dissected through the various layers of the axilla including dissection through the clavipectoral fascia into the deep axilla where 2 sentinel lymph nodes were identified, brought in the field, excised, and sent for permanent pathologic evaluation with no gross abnormality.  The wound was irrigated.  Hemostasis was assured with electrocautery and hemoclips, and closed with an interrupted 3-0 Vicryl for deep layer and a running 4-0 subcuticular Monocryl for skin.  Mastisol and Steri-Strips were applied.  Then, 0.5% Marcaine was instilled in the cavity to assist with postoperative analgesia.  A sterile dressing was applied.  Attention was taken toward the breast.  Then, 1% lidocaine with epinephrine was used to infiltrate the skin and subcutaneous tissues at the targeted incision site.  A curvilinear incision was made along the lateral areolar border with a 15-blade knife in the skin.  Using sharp dissection and electrocautery, I identified the wire.  This was brought in the field, and a segment of breast tissue surrounding the tip of the wire was carefully  excised with a short stitch single clip superiorly and a long stitch double clip laterally in order to allow for appropriate pathological margin assessment and review.  It was placed in the imaging device where specimen x-ray confirmed the presence of the targeted clip residual area with adequate margins as deemed by myself.  Using sharp dissection and electrocautery, 6 margins were taken from the interior of the lumpectomy cavity, each were marked with a clip at the true margin, individually labeled, and sent for permanent pathologic evaluation.  Wound was irrigated with warm sterile saline.  Hemostasis was assured with electrocautery, and hemoclips were placed around the periphery of the cavity to assist in subsequent surveillance.  The wound was then irrigated with warm sterile saline.  Hemostasis was assured with electrocautery.  She was found have a large defect in the lateral breast measuring at least 12 sq cm thought to impair both optimal wound healing and cosmesis, for which we proceeded with an advancement flap mastopexy.  This required that a counterincision be placed through the immediate adjacent superior and lateral breast parenchyma down to the level of the pectoralis fascia via a superior vascular pedicle and mobilized this tissue into the aforementioned defect and secured this at the level of the pectoralis fascia with a running 3-0 PDS suture.  This wound was then closed with an interrupted 3-0 Vicryl for deep layer and a running 4-0 subcuticular Monocryl for skin.  Mastisol and Steri-Strips were applied.  Then, 0.5% Marcaine was instilled in the cavity to assist with postoperative analgesia.  A sterile dressing and compression bra were placed.  Blood loss was minimal.  All counts were correct at the conclusion of the procedure.  She tolerated the procedure well.  She was transferred to recovery area in stable condition.    Dictated By Lucero Davenport M.D.  d: 05/16/2024  16:36:53  t: 05/16/2024 21:39:48  [facility] [id number]  [facility] [place]    cc: MD Eugenie Garcia MD    [place] Node Biopsy for Breast Cancer - Left  Operation performed with curative intent. Yes   Tracer(s) used to identify sentinel nodes in the upfront surgery (non-neoadjuvant) setting (select all that apply). Dye and Radioactive tracer   Tracer(s) used to identify sentinel nodes in the neoadjuvant setting (select all that apply). N/A   All nodes (colored or non-colored) present at the end of a dye-filled lymphatic channel were removed. Yes   All significantly radioactive nodes were removed. Yes   All palpably suspicious nodes were removed. Yes   Biopsy-proven positive nodes marked with clips prior to chemotherapy were identified and removed. N/A

## 2024-05-17 NOTE — TELEPHONE ENCOUNTER
Called patient in regards to her condition status post lumpectomy. Patient states she is using her prescribed pain medication and is relieving her pain and is eating, drinking, and slept last night without difficulties. She stated she and her daughter looked at her incisions and denies any signs of infection or drainage. Denies nausea or vomiting and is eating without difficulty. Discussed scheduling appointment with medical oncologist and patient chose Dr. Multani as her medical oncologist for her care at [facility]. Patient is scheduled at the [facility] on June 18, 2024 at 0930. I stated we will be discussing her case on Tuesday May 21, 2024 at the multidisciplinary conference and the breast RN navigators will contact her with any updates. She thanked me for the phone call and assistance.

## 2024-05-17 NOTE — TELEPHONE ENCOUNTER
Left voicemail for patient to call back. Calling post op day 1 to check in and schedule with medical oncology.

## 2024-05-20 ENCOUNTER — NURSE NAVIGATOR ENCOUNTER (OUTPATIENT)
Dept: HEMATOLOGY/ONCOLOGY | Facility: HOSPITAL | Age: 54
End: 2024-05-20

## 2024-05-20 ENCOUNTER — TELEPHONE (OUTPATIENT)
Dept: HEMATOLOGY/ONCOLOGY | Facility: HOSPITAL | Age: 54
End: 2024-05-20

## 2024-05-20 NOTE — PROGRESS NOTES
Oncotype ordered per Dr. Davenport. Patient has been informed of the test.  She is scheduled on 6/18 with Dr. Multani, if results are high may reschedule to a different medical oncologist with a sooner appointment.  She is in agreement.

## 2024-05-20 NOTE — TELEPHONE ENCOUNTER
Patient called stating she saw her MyChart results for her breast cancer surgery with Dr. Davenport. Patient stated she read Dr. Davenport's note attached to her results and called the navigators for clarification. Discussed Oncotype DX testing with patient and stated that Dr. Davenport, Dr. Multani, myself, and Joanna have discussed her case today and if her Oncotype DX test is high we will schedule her with another medical oncologist. Patient was agreeable. She thanked me for the update and information.

## 2024-05-21 ENCOUNTER — NURSE NAVIGATOR ENCOUNTER (OUTPATIENT)
Dept: HEMATOLOGY/ONCOLOGY | Facility: HOSPITAL | Age: 54
End: 2024-05-21

## 2024-05-21 NOTE — PROGRESS NOTES
Placed prior authorization in [employer] provider online portal for Oncotype DX test. Prior authorization is pending approval upon note from Dr. Davenport. Will attach noted once provided.

## 2024-05-23 ENCOUNTER — OFFICE VISIT (OUTPATIENT)
Dept: SURGERY | Facility: CLINIC | Age: 54
End: 2024-05-23

## 2024-05-23 VITALS
RESPIRATION RATE: 16 BRPM | BODY MASS INDEX: 32 KG/M2 | SYSTOLIC BLOOD PRESSURE: 136 MMHG | DIASTOLIC BLOOD PRESSURE: 77 MMHG | TEMPERATURE: 98 F | HEART RATE: 65 BPM | WEIGHT: 195 LBS | OXYGEN SATURATION: 97 %

## 2024-05-23 DIAGNOSIS — C50.912 INVASIVE DUCTAL CARCINOMA OF LEFT BREAST (HCC): Primary | ICD-10-CM

## 2024-05-23 PROCEDURE — 99024 POSTOP FOLLOW-UP VISIT: CPT

## 2024-05-24 NOTE — PROGRESS NOTES
Breast Surgery Post-Operative Visit    Diagnosis: Invasive ductal carcinoma, left breast, status post lumpectomy with sentinel lymph node biopsy on 5/16/2024.    Stage: Cancer Staging   No matching staging information was found for the patient.      Disease Status:  Surgical treatment complete, Oncotype Dx, medical and radiation oncology recommendations pending.      History of Present Illness:   Ms. Anna Palacios is a 53 year old woman who presents with imaging detected left breast cancer.  The patient denies any palpable masses, nipple discharge, skin changes or axillary symptoms.  She does not have any known family history of breast cancer.  She has no personal prior history of breast disease or biopsies.  She presented for screening mammogram on March 7, 2023 and was found to have scattered densities with no suspicious findings.  Her screening mammogram in March 2024 demonstrated nodular asymmetries in the left breast for which additional imaging was recommended.  A left diagnostic evaluation on April 2, 2024 confirmed a 1.5 cm nodule at 3:00, 6 cm from the nipple for which biopsy was recommended.  She was noted to otherwise have a benign and incidental cyst and was noted to have normal-appearing axillary lymph nodes.  She had the left breast ultrasound-guided biopsy on April 12, 2024 and was found to have invasive ductal carcinoma with prominent lymphocytic response that was grade 3 measuring up to 7 mm, ER 15%, AL 15%, HER2/mark negative with a Ki-67 greater than 90%. She underwent lumpectomy with sentinel lymph node biopsy, which occurred without complication. She is here for postoperative visit. She reports her pain is under control. She denies erythema, warmth, drainage, or fevers.  She is here today for evaluation and recommendations for further therapy.        Past Medical History:    Anesthesia complication    gets shakey    Cancer (HCC)    DCIS (ductal carcinoma in situ) of breast    DUB (dysfunctional  uterine bleeding)    Fatigue    more often through menopause    Flatulence/gas pain/belching    once in awhile not consistently    Headache disorder    sometimes    High cholesterol    Night sweats    since began premenopause    Sleep disturbance    some insomnia lately due to menopause    Visual impairment    reading glasses    Wears glasses    only for reading    Weight gain    through menopause       Past Surgical History:   Procedure Laterality Date    Colonoscopy  24    Colonoscopy      D & c  2022    Dilation/curettage,diagnostic      Endometrial ablation      Endometrial biopsy - jar(s): 2  2022    Hysteroscopy  2022    [place] teeth removed         Gynecological History:  Pt is a   Pt was 28 years old at time of first pregnancy.    She has cumulative breastfeeding history of 10 months.  She achieved menarche at age 14 and LMP age 52, pt underwent ablation   She denies any history of hormone replacement therapy   She has history of oral contraceptive use for 1 years, last in .  She denies infertility treatment to achieve pregnancy.    Medications:     Calcium Carbonate (CALCIUM 600 OR) Take 600 mg by mouth daily.      Multiple Vitamins-Minerals (MULTI-VITAMIN/MINERALS) Oral Tab Take 1 tablet by mouth daily.      NON FORMULARY Take 2 tablets by mouth daily. MENOQUIL MENOPAUSE      Cholecalciferol (VITAMIN D) 50 MCG (2000 UT) Oral Cap Take 1 capsule (2,000 Units total) by mouth daily.      Nystatin 928665 UNIT/GM External Powder Apply 1 Application topically daily.      hydrocortisone 2.5 % External Cream Use on AA QD-BID prn 30 g 2    ketoconazole 2 % External Cream Apply to AA QD-BID prn. 60 g 2       Allergies:    Allergies   Allergen Reactions    Penicillins ITCHING     As a child       Family History:   Family History   Problem Relation Age of Onset    Hypertension Mother     Cancer Father 74        psa and lungs    Prostate Cancer Father     Other (lung cancer) Paternal Uncle      Other (esophagus cancer) Paternal Cousin        She is not of Ashkenazi Taoist ancestry.    Social History:  History   Alcohol Use Never       History   Smoking Status    Never   Smokeless Tobacco    Never     Ms. Anna Palacios is  with 2 children. She has 2 siblings. She is currently Homemaker      Review of Systems:  General:   The patient denies, fever, chills, +night sweats, +fatigue, generalized weakness, change in appetite or weight loss.    HEENT:     The patient denies eye irritation, cataracts, redness, glaucoma, yellowing of the eyes, change in vision, color blindness, or +wearing contacts/glasses. The patient denies hearing loss, ringing in the ears, ear drainage, earaches, nasal congestion, nose bleeds, snoring, pain in mouth/throat, hoarseness, change in voice, facial trauma.    Respiratory:  The patient denies chronic cough, phlegm, hemoptysis, pleurisy/chest pain, pneumonia, asthma, wheezing, difficulty in breathing with exertion, emphysema, chronic bronchitis, shortness of breath or abnormal sound when breathing.     Cardiovascular:  There is no history of chest pain, chest pressure/discomfort, palpitations, irregular heartbeat, fainting or near-fainting, difficulty breathing when lying flat, SOB/Coughing at night, swelling of the legs or chest pain while walking.    Breasts:  See history of present illness    Gastrointestinal:     There is no history of difficulty or pain with swallowing, reflux symptoms, vomiting, dark or bloody stools, constipation, yellowing of the skin, indigestion, nausea, change in bowel habits, diarrhea, abdominal pain or vomiting blood.     Genitourinary:  The patient denies frequent urination, needing to get up at night to urinate, urinary hesitancy or retaining urine, painful urination, urinary incontinence, decreased urine stream, blood in the urine or vaginal/penile discharge.    Skin:    The patient denies rash, itching, skin lesions, dry skin, change in skin  color or change in moles.     Hematologic/Lymphatic:  The patient denies easily bruising or bleeding or persistent swollen glands or lymph nodes.     Musculoskeletal:  The patient denies muscle aches/pain, joint pain, stiff joints, neck pain, back pain or bone pain.    Neuropsychiatric:  There is no history of migraines or severe headaches, seizure/epilepsy, speech problems, coordination problems, trembling/tremors, fainting/black outs, dizziness, memory problems, loss of sensation/numbness, problems walking, weakness, tingling or burning in hands/feet. There is no history of abusive relationship, bipolar disorder, +sleep disturbance, anxiety, depression or feeling of despair.    Endocrine:    There is no history of poor/slow wound healing, weight loss/gain, fertility or hormone problems, cold intolerance, thyroid disease.     Allergic/Immunologic:  There is no history of hives, hay fever, angioedema or anaphylaxis.    /77 (BP Location: Right arm, Patient Position: Sitting, Cuff Size: adult)   Pulse 65   Temp 98.3 °F (36.8 °C)   Resp 16   Wt 88.5 kg (195 lb)   SpO2 97%   BMI 32.45 kg/m²     Physical Exam:  The patient is an alert, oriented, well-nourished and  well-developed woman who appears her stated age. Her speech patterns and movements are normal. Her affect is appropriate.    HEENT: The head is normocephalic. The neck is supple. The thyroid is not enlarged and is without palpable masses/nodules. There are no palpable masses. The trachea is in the midline. Conjunctiva are clear, non-icteric.    Chest: The chest expands symmetrically. The lungs are clear to auscultation.    Heart: The rhythm is regular.  There are no murmurs, rubs, gallops or thrills.    Breasts:  Her breasts are symmetrical with a cup size 38B.  Right breast: The skin, nipple ,and areola appear normal. There is no skin dimpling with movement of the pectoralis. There is no nipple retraction. No nipple discharge can be elicited. The  parenchyma is mildly nodular. There are no dominant masses in the breast. The axillary tail is normal.  Left breast:   The skin, nipple, and areola appear normal. There is no skin dimpling with movement of the pectoralis. There is no nipple retraction. No nipple discharge can be elicited. The parenchyma is mildly nodular. There are no dominant masses in the breast. The axillary tail is normal.    Abdomen:  The abdomen is soft, flat and non tender. The liver is not enlarged. There are no palpable masses.    Lymph Nodes:  The supraclavicular, axillary and cervical regions are free of significant lymphadenopathy.    Back: There is no vertebral column tenderness.    Skin: The skin appears normal. There are no suspicious appearing rashes or lesions.    Extremities: The extremities are without deformity, cyanosis or edema.    Impression:   Ms. Anna Palacios is a 53 year old woman presents with left breast cancer, clinical stage T1 NX MX, s/p lumpectomy with SLNB    Recommendations:   I had a discussion with the Patient regarding her breast exam.  She is healing well since surgery with no signs of infection. I  reviewed her pathology.  She will follow-up with Dr. Davenport for second postoperative visit.  Oncotype Dx is pending.  I encouraged her to continue monitoring her ROM and strength and explained that a referral to physical therapy may be warranted in the future if she identifies any limitations or restrictions. She was given ample opportunity for questions and those questions were answered to her satisfaction. She was encouraged to contact the office with any questions or concerns prior to her next scheduled appointment.       This note was created by Dragon voice recognition. Errors in content may be related to improper recognition by the system; efforts to review and correct have been done but errors may still exist. Please be advised the primary purpose of this note is for me to communicate medical care. Standard  sentence structure is not always used. Medical terminology and medical abbreviations may be used. There may be grammatical, typographical, and automated fill ins that may have errors missed in proofreading.

## 2024-05-28 ENCOUNTER — OFFICE VISIT (OUTPATIENT)
Dept: SURGERY | Facility: CLINIC | Age: 54
End: 2024-05-28

## 2024-05-28 VITALS
TEMPERATURE: 99 F | SYSTOLIC BLOOD PRESSURE: 132 MMHG | HEART RATE: 76 BPM | DIASTOLIC BLOOD PRESSURE: 77 MMHG | OXYGEN SATURATION: 97 % | BODY MASS INDEX: 32 KG/M2 | WEIGHT: 195 LBS | RESPIRATION RATE: 17 BRPM

## 2024-05-28 DIAGNOSIS — C50.912 INVASIVE DUCTAL CARCINOMA OF LEFT BREAST (HCC): Primary | ICD-10-CM

## 2024-05-28 PROCEDURE — 99024 POSTOP FOLLOW-UP VISIT: CPT | Performed by: SURGERY

## 2024-05-28 NOTE — PROGRESS NOTES
Breast Surgery Post-Operative Visit    Diagnosis: Invasive ductal carcinoma, left breast, status post lumpectomy with sentinel lymph node biopsy on 5/16/2024.    Stage: T1cN0 (I+)Mx    Disease Status:  Surgical treatment complete, Oncotype Dx, medical and radiation oncology recommendations pending.    History of Present Illness:   Ms. Anna Palacios is a 53 year old woman who presents with imaging detected left breast cancer.  The patient denies any palpable masses, nipple discharge, skin changes or axillary symptoms.  She does not have any known family history of breast cancer.  She has no personal prior history of breast disease or biopsies.  She presented for screening mammogram on March 7, 2023 and was found to have scattered densities with no suspicious findings.  Her screening mammogram in March 2024 demonstrated nodular asymmetries in the left breast for which additional imaging was recommended.  A left diagnostic evaluation on April 2, 2024 confirmed a 1.5 cm nodule at 3:00, 6 cm from the nipple for which biopsy was recommended.  She was noted to otherwise have a benign and incidental cyst and was noted to have normal-appearing axillary lymph nodes.  She had the left breast ultrasound-guided biopsy on April 12, 2024 and was found to have invasive ductal carcinoma with prominent lymphocytic response that was grade 3 measuring up to 7 mm, ER 15%, WV 15%, HER2/mark negative with a Ki-67 greater than 90%. She underwent lumpectomy with sentinel lymph node biopsy, which occurred without complication. She is here for postoperative visit. She reports her pain is under control. She denies erythema, warmth, drainage, or fevers.She is here today for evaluation and recommendations for further therapy.        Past Medical History:    Anesthesia complication    gets shakey    Cancer (HCC)    DCIS (ductal carcinoma in situ) of breast    DUB (dysfunctional uterine bleeding)    Fatigue    more often through menopause     Flatulence/gas pain/belching    once in awhile not consistently    Headache disorder    sometimes    High cholesterol    Night sweats    since began premenopause    Sleep disturbance    some insomnia lately due to menopause    Visual impairment    reading glasses    Wears glasses    only for reading    Weight gain    through menopause       Past Surgical History:   Procedure Laterality Date    Colonoscopy  24    Colonoscopy      D & c  2022    Dilation/curettage,diagnostic      Endometrial ablation      Endometrial biopsy - jar(s): 2  2022    Hysteroscopy  2022    [place] teeth removed         Gynecological History:  Pt is a   Pt was 28 years old at time of first pregnancy.    She has cumulative breastfeeding history of 10 months.  She achieved menarche at age 14 and LMP age 52, pt underwent ablation   She denies any history of hormone replacement therapy   She has history of oral contraceptive use for 1 years, last in .  She denies infertility treatment to achieve pregnancy.    Medications:     Calcium Carbonate (CALCIUM 600 OR) Take 600 mg by mouth daily.      Multiple Vitamins-Minerals (MULTI-VITAMIN/MINERALS) Oral Tab Take 1 tablet by mouth daily.      NON FORMULARY Take 2 tablets by mouth daily. MENOQUIL MENOPAUSE      Cholecalciferol (VITAMIN D) 50 MCG (2000 UT) Oral Cap Take 1 capsule (2,000 Units total) by mouth daily.      Nystatin [id number] UNIT/GM External Powder Apply 1 Application topically daily.      hydrocortisone 2.5 % External Cream Use on AA QD-BID prn 30 g 2    ketoconazole 2 % External Cream Apply to AA QD-BID prn. 60 g 2       Allergies:    Allergies   Allergen Reactions    Penicillins ITCHING     As a child       Family History:   Family History   Problem Relation Age of Onset    Hypertension Mother     Cancer Father 74        psa and lungs    Prostate Cancer Father     Other (lung cancer) Paternal Uncle     Other (esophagus cancer) Paternal Cousin        She is not  of Ashkenazi [employer] ancestry.    Social History:  History   Alcohol Use Never       History   Smoking Status    Never   Smokeless Tobacco    Never     Ms. Anna Palacios is  with 2 children. She has 2 siblings. She is currently Homemaker      Review of Systems:  General:   The patient denies, fever, chills, +night sweats, +fatigue, generalized weakness, change in appetite or weight loss.    HEENT:     The patient denies eye irritation, cataracts, redness, glaucoma, yellowing of the eyes, change in vision, color blindness, or +wearing contacts/glasses. The patient denies hearing loss, ringing in the ears, ear drainage, earaches, nasal congestion, nose bleeds, snoring, pain in mouth/throat, hoarseness, change in voice, facial trauma.    Respiratory:  The patient denies chronic cough, phlegm, hemoptysis, pleurisy/chest pain, pneumonia, asthma, wheezing, difficulty in breathing with exertion, emphysema, chronic bronchitis, shortness of breath or abnormal sound when breathing.     Cardiovascular:  There is no history of chest pain, chest pressure/discomfort, palpitations, irregular heartbeat, fainting or near-fainting, difficulty breathing when lying flat, SOB/Coughing at night, swelling of the legs or chest pain while walking.    Breasts:  See history of present illness    Gastrointestinal:     There is no history of difficulty or pain with swallowing, reflux symptoms, vomiting, dark or bloody stools, constipation, yellowing of the skin, indigestion, nausea, change in bowel habits, diarrhea, abdominal pain or vomiting blood.     Genitourinary:  The patient denies frequent urination, needing to get up at night to urinate, urinary hesitancy or retaining urine, painful urination, urinary incontinence, decreased urine stream, blood in the urine or vaginal/penile discharge.    Skin:    The patient denies rash, itching, skin lesions, dry skin, change in skin color or change in moles.     Hematologic/Lymphatic:  The  patient denies easily bruising or bleeding or persistent swollen glands or lymph nodes.     Musculoskeletal:  The patient denies muscle aches/pain, joint pain, stiff joints, neck pain, back pain or bone pain.    Neuropsychiatric:  There is no history of migraines or severe headaches, seizure/epilepsy, speech problems, coordination problems, trembling/tremors, fainting/black outs, dizziness, memory problems, loss of sensation/numbness, problems walking, weakness, tingling or burning in hands/feet. There is no history of abusive relationship, bipolar disorder, +sleep disturbance, anxiety, depression or feeling of despair.    Endocrine:    There is no history of poor/slow wound healing, weight loss/gain, fertility or hormone problems, cold intolerance, thyroid disease.     Allergic/Immunologic:  There is no history of hives, hay fever, angioedema or anaphylaxis.    /77 (BP Location: Left arm, Patient Position: Sitting, Cuff Size: adult)   Pulse 76   Temp 98.5 °F (36.9 °C) (Temporal)   Resp 17   Wt 88.5 kg (195 lb)   SpO2 97%   BMI 32.45 kg/m²     Physical Exam:  The patient is an alert, oriented, well-nourished and  well-developed woman who appears her stated age. Her speech patterns and movements are normal. Her affect is appropriate.    HEENT: The head is normocephalic. The neck is supple. The thyroid is not enlarged and is without palpable masses/nodules. There are no palpable masses. The trachea is in the midline. Conjunctiva are clear, non-icteric.    Chest: The chest expands symmetrically. The lungs are clear to auscultation.    Heart: The rhythm is regular.  There are no murmurs, rubs, gallops or thrills.    Breasts:  Her breasts are symmetrical with a cup size 38B.  Right breast: The skin, nipple ,and areola appear normal. There is no skin dimpling with movement of the pectoralis. There is no nipple retraction. No nipple discharge can be elicited. The parenchyma is mildly nodular. There are no  dominant masses in the breast. The axillary tail is normal.  Left breast:   The skin, nipple, and areola appear normal. There is no skin dimpling with movement of the pectoralis. There is no nipple retraction. No nipple discharge can be elicited. The parenchyma is mildly nodular. There are no dominant masses in the breast. The axillary tail is normal.  There is a well-healed incision with no signs of infection.  Abdomen:  The abdomen is soft, flat and non tender. The liver is not enlarged. There are no palpable masses.    Lymph Nodes:  The supraclavicular, axillary and cervical regions are free of significant lymphadenopathy.    Back: There is no vertebral column tenderness.    Skin: The skin appears normal. There are no suspicious appearing rashes or lesions.    Extremities: The extremities are without deformity, cyanosis or edema.    Impression:   Ms. Anna Palacios is a 53 year old woman presents with left breast cancer, s/p lumpectomy with SLNB.    Recommendations:   I had a discussion with the Patient regarding her breast exam.  She is healing well since surgery with no signs of infection. I  reviewed her pathology.  Pathology demonstrated a 1.8 cm tumor with a satellite nodule also measuring 6 mm, clear margins and 1 out of 3 lymph nodes with isolated tumor cells.  No further surgery is recommended this time.  We discussed systemic treatment recommendations and the need for Oncotype DX to determine the role of chemotherapy versus endocrine therapy in this setting.  Oncotype Dx is being requested per medical oncology to help with this decision.  She will also need to meet with radiation oncology for further augmentation of local regional control.  Anticipate her first set of imaging will take place maximally 6 months following completion of the radiation with a clinical exam due at that time.  I encouraged her to continue monitoring her ROM and strength and explained that a referral to physical therapy may be  warranted in the future if she identifies any limitations or restrictions. She was given ample opportunity for questions and those questions were answered to her satisfaction. She was encouraged to contact the office with any questions or concerns prior to her next scheduled appointment.       This note was created by ZAP voice recognition. Errors in content may be related to improper recognition by the system; efforts to review and correct have been done but errors may still exist. Please be advised the primary purpose of this note is for me to communicate medical care. Standard sentence structure is not always used. Medical terminology and medical abbreviations may be used. There may be grammatical, typographical, and automated fill ins that may have errors missed in proofreading.

## 2024-06-03 ENCOUNTER — TELEPHONE (OUTPATIENT)
Dept: HEMATOLOGY/ONCOLOGY | Facility: HOSPITAL | Age: 54
End: 2024-06-03

## 2024-06-03 NOTE — TELEPHONE ENCOUNTER
Received phone call from [facility] for the PA number for Oncotype [id number], PA#s are [phone] and [phone]

## 2024-06-04 ENCOUNTER — NURSE NAVIGATOR ENCOUNTER (OUTPATIENT)
Dept: HEMATOLOGY/ONCOLOGY | Facility: HOSPITAL | Age: 54
End: 2024-06-04

## 2024-06-04 ENCOUNTER — OFFICE VISIT (OUTPATIENT)
Dept: HEMATOLOGY/ONCOLOGY | Facility: HOSPITAL | Age: 54
End: 2024-06-04
Attending: INTERNAL MEDICINE
Payer: COMMERCIAL

## 2024-06-04 VITALS
TEMPERATURE: 98 F | DIASTOLIC BLOOD PRESSURE: 85 MMHG | RESPIRATION RATE: 16 BRPM | HEIGHT: 65.16 IN | HEART RATE: 85 BPM | WEIGHT: 194.63 LBS | BODY MASS INDEX: 32.04 KG/M2 | SYSTOLIC BLOOD PRESSURE: 135 MMHG | OXYGEN SATURATION: 96 %

## 2024-06-04 DIAGNOSIS — R74.8 ELEVATED ALKALINE PHOSPHATASE LEVEL: ICD-10-CM

## 2024-06-04 DIAGNOSIS — C50.812 MALIGNANT NEOPLASM OF OVERLAPPING SITES OF LEFT BREAST IN FEMALE, ESTROGEN RECEPTOR POSITIVE (HCC): Primary | ICD-10-CM

## 2024-06-04 DIAGNOSIS — Z17.0 MALIGNANT NEOPLASM OF OVERLAPPING SITES OF LEFT BREAST IN FEMALE, ESTROGEN RECEPTOR POSITIVE (HCC): Primary | ICD-10-CM

## 2024-06-04 PROCEDURE — 99205 OFFICE O/P NEW HI 60 MIN: CPT | Performed by: INTERNAL MEDICINE

## 2024-06-04 NOTE — CONSULTS
[facility] Report of Consultation    Patient Name: Anna Palacios   YOB: 1970   Medical Record Number: [id number]   CSN: [id number]   Consulting Physician: Nilson Sinclair MD  Referring Physician(s): No ref. provider found  Date of Consultation: 6/4/2024     Reason for Consultation:  Anna Palacios was seen today in the Cancer Center for evaluation and management of left breast cancer.    History of Present Illness:     53 year old woman had a screening mammogram on 3/20/2024 that showed asymmetry in the left breast. She had a diagnostic mammogram and US on 4/2/2024 that showed a 1.5 cm mass at the 3 o'clock left breast. She had a biopsy on 4/12/2024 that showed grade III invasive ductal carcinoma with ER 15%, SC 15%, Ki-67 >90% and Her2 1+. She proceeded to have lumpectomy and SLN procedure on 5/10/2024. The left breast had 1.8 cm IDC, grade III. There was an additional 0.6 cm satellite lesion. The SLN had 0/3 nodes but one node had isolated tumor cells. The Oncotype Dx RS was 68. The Oncotype Dx evaluation showed negative estrogen and progesterone receptor quantitative single-gene scores.     She has been otherwise well. She has no bone pain. She has no dyspnea or cough. SHe has no fever or sweats.    She had labs in 3/2024 with normal CBC but the CMP had an elevated alk phos and calcium. The alk phos was 127 and calcium was 10.2.     Past Medical History:  Past Medical History:    Anesthesia complication    gets shakey    DCIS (ductal carcinoma in situ) of breast    DUB (dysfunctional uterine bleeding)    Headache disorder    sometimes    High cholesterol    Sleep disturbance    some insomnia lately due to menopause    Visual impairment    reading glasses    Wears glasses    only for reading    Weight gain    through menopause       Past Surgical History:  Past Surgical History:   Procedure Laterality Date    Colonoscopy  5-18-23    Colonoscopy      D & c  04/21/2022    Dilation/curettage,diagnostic       Endometrial ablation      Endometrial biopsy - jar(s): 2  2022    Hysteroscopy  2022    [place] teeth removed         Family Medical History:  Family History   Problem Relation Age of Onset    Hypertension Mother     Cancer Father 74        psa and lungs    Prostate Cancer Father     Other (lung cancer) Paternal Uncle     Other (esophagus cancer) Paternal Cousin        Gyne History:  OB History    Para Term  AB Living   2 2 2 0 0 2   SAB IAB Ectopic Multiple Live Births   0 0 0 0 2       Psychosocial History:  Social History     Socioeconomic History    Marital status:      Spouse name: Not on file    Number of children: 2    Years of education: Not on file    Highest education level: Not on file   Occupational History    Not on file   Tobacco Use    Smoking status: Never     Passive exposure: Never    Smokeless tobacco: Never   Vaping Use    Vaping status: Never Used   Substance and Sexual Activity    Alcohol use: Never    Drug use: Never    Sexual activity: Not Currently     Partners: Male   Other Topics Concern    Caffeine Concern Yes     Comment: occ pop    Exercise Yes     Comment: 3x/week or daily walking     Seat Belt Yes    Special Diet Not Asked    Stress Concern Not Asked    Weight Concern Not Asked     Service Not Asked    Blood Transfusions Not Asked    Occupational Exposure Not Asked    Hobby Hazards Not Asked    Sleep Concern Not Asked    Back Care Not Asked    Bike Helmet Not Asked    Self-Exams Not Asked   Social History Narrative    Not on file     Social Determinants of Health     Financial Resource Strain: Not on file   Food Insecurity: Not on file   Transportation Needs: Not on file   Physical Activity: Not on file   Stress: Not on file   Social Connections: Not on file   Housing Stability: Not on file       Allergies:   Allergies   Allergen Reactions    Penicillins ITCHING     As a child       Current Medications:    Current Outpatient Medications:      Calcium Carbonate (CALCIUM 600 OR), Take 600 mg by mouth daily., Disp: , Rfl:     Multiple Vitamins-Minerals (MULTI-VITAMIN/MINERALS) Oral Tab, Take 1 tablet by mouth daily., Disp: , Rfl:     Cholecalciferol (VITAMIN D) 50 MCG (2000 UT) Oral Cap, Take 1 capsule (2,000 Units total) by mouth daily., Disp: , Rfl:     Nystatin 959747 UNIT/GM External Powder, Apply 1 Application topically daily., Disp: , Rfl:     hydrocortisone 2.5 % External Cream, Use on AA QD-BID prn, Disp: 30 g, Rfl: 2    ketoconazole 2 % External Cream, Apply to AA QD-BID prn., Disp: 60 g, Rfl: 2    Review of Systems:    Constitutional: Negative for anorexia, fatigue, fevers, chills, night sweats and weight loss.  Eyes: Negative for visual disturbance, irritation and redness.  Respiratory: Negative for cough, hemoptysis, chest pain, or dyspnea.  Cardiovascular: Negative for angina, orthopnea or palpitations.  Gastrointestinal: Negative for nausea, vomiting, change in bowel habits, diarrhea, constipation and abdominal pain.  Integument/breast: Negative for rash, skin lesions, and pruritus.  Hematologic/lymphatic: Negative for easy bruising, bleeding, and lymphadenopathy.  Musculoskeletal: Negative for myalgias, arthralgias, muscle weakness.  Genitourinary: Negative for dysuria or hematuria  Neurological: Negative for headaches, dizziness, speech problems, gait problems and focal weakness.  Psychiatric: The patient's mood was calm and appropriate for this visit.    The pertinent positives and negatives were described in the HPI and above. All other systems were negative.      Vital Signs:  Height: 165.5 cm (5' 5.16\") (06/04 1251)  Weight: 88.3 kg (194 lb 9.6 oz) (06/04 1251)  BSA (Calculated - sq m): 1.96 sq meters (06/04 1251)  Pulse: 85 (06/04 1251)  BP: 135/85 (06/04 1251)  Temp: 98.2 °F (36.8 °C) (06/04 1251)  Do Not Use - Resp Rate: --  SpO2: 96 % (06/04 1251)    Physical Examination:    Constitutional: Patient is alert and oriented x 3, not in  acute distress.  HEENT:  Oropharynx is clear. Neck is supple.  Eyes: Anicteric sclera. Pink conjunctiva.  Respiratory: Clear to auscultation and percussion. No rales.  No wheezes.  Cardiovascular: Regular rate and rhythm.   Gastrointestinal: Soft, non tender with good bowel sounds.  Extremities: No edema. No calf tenderness.  Neurological: Grossly intact without focal motor or sensory deficit.  Lymphatics: There is no palpable lymphadenopathy throughout in the cervical, supraclavicular, or axillary regions.    Labs reviewed at this visit:  Lab Results   Component Value Date    WBC 6.5 06/04/2024    RBC 4.86 06/04/2024    HGB 14.3 06/04/2024    HCT 42.3 06/04/2024    MCV 87.0 06/04/2024    MCH 29.4 06/04/2024    MCHC 33.8 06/04/2024    RDW 11.8 06/04/2024    .0 06/04/2024     Lab Results   Component Value Date     06/04/2024    K 4.6 06/04/2024     06/04/2024    CO2 29.0 06/04/2024    BUN 15 06/04/2024    CREATSERUM 0.72 06/04/2024    GLU 91 06/04/2024    CA 10.0 06/04/2024    ALKPHO 132 (H) 06/04/2024    ALT 34 06/04/2024    AST 13 (L) 06/04/2024    BILT 0.2 06/04/2024    ALB 3.8 06/04/2024    TP 8.4 (H) 06/04/2024     Lab Component   Component Value Date/Time     06/04/2024 1439         Radiologic imaging reviewed at this visit:    Mammogram on 4/2/2024:  BREAST COMPOSITION:  Scattered areas fibroglandular density.     FINDINGS:       Mammographic findings:  There is a subcentimeter nodule within the slightly inner lower left breast, mid depth.  There is a focal asymmetry within the posterior aspect of the left breast at the 3 o'clock position.     Focal left breast ultrasound:     At the 3 o'clock position 6 cm from the left nipple, there is a lobulated 14 x 15 x 12 mm hypoechoic nodule.  This has a prominent vessel at its superior margin.     At the 0400 hours left retroareolar position, there is a 3 mm cyst.  At the 0600 hours left retroareolar region, there is a 3 mm cyst.      Impression   CONCLUSION:  Ultrasound-guided biopsy of the 15 mm hypoechoic lobulated nodule at the 3 o'clock position 6 cm from the left nipple is recommended for further characterization.     This conclusion/recommendation was discussed with the patient at the time of the examination.     BI-RADS CATEGORY:    DIAGNOSTIC CATEGORY 4c-HIGH SUSPICION, BUT NOT CLASSIC FOR MALIGNANCY:       RECOMMENDATIONS:    ULTRASOUND-GUIDED BIOPSY: LEFT BREAST            Assessment/Plan:    Left breast cancer in overlapping quadrants (3 o'clock):  Invasive ductal carcinoma, grade III  Lumpectomy on 4/12/2024  1.8 cm IDC  ER 15 %  WA 15%  Ki-67 >90%  Her2 1+  SLN 0/3 (1 node with ITC)  Oncotype Dx RS 69  Single gene studys on Oncotype with ER/WA/Her2 negative    I had a long discussion with the patient about her work up and management. I recommend a metastatic work up with Bone scan and CT of the CAP. She has an elevated alk phos and has had borderline elevated calcium. Her globulins are elevated. We will also need to send a monoclonal protein study.    I think we should treat this breast cancer as a triple negative breast cancer given the Oncotype Dx results. I would recommend adjuvant systemic chemotherapy. I recommended treatment with adjuvant dose dense AC/T. I discussed how this is given and I discussed the risk of nausea, emesis, low blood counts, infection, mucositis, change in bowel movements, heart toxicity, and neuropathy and hair loss. I recommended that she have a portacath for central access. I also recommend cardio-oncology.    We will first get the metastatic work up. If this is negative then we will proceed with chemotherapy.    After chemotherapy she will need left breast radiation. We would then consider endocrine therapy +/- a CDK4/6 inhibitor for possible heterogeneity of ER/WA expression.    This visit lasted 90 minutes with 25 minutes for history, 10 minutes for exam, 45 minutes for discussion of plan, and 10  minutes for post visit charting.        Nilson Sinclair MD

## 2024-06-04 NOTE — PROGRESS NOTES
Scheduled patient's CT and bone scan for next week. Reviewed timing in relation to the CT scan and provided barium to patient. Discussed bone scan instructions. Provided a print out of the date, time and location of these visits.  Discussed treatment and the port a cath. Provided tour of infusion to patient.  Patient will call with any further questions. Will schedule chemo education, port a cath and first infusion after results from staging.  Discussed next steps with patient and her .

## 2024-06-04 NOTE — PROGRESS NOTES
Patient here for new consult for breast cancer. Patient had left breast lumpectomy on 5/16/24.  Patient accompanied by her  Lan.     Education Record    Learner:  Patient and Spouse    Disease / Diagnosis: new consult breast cancer     Barriers / Limitations:  None   Comments:    Method:  Discussion   Comments:    General Topics:  Medication, Side effects and symptom management, and Plan of care reviewed   Comments:    Outcome:  Shows understanding   Comments:

## 2024-06-12 ENCOUNTER — HOSPITAL ENCOUNTER (OUTPATIENT)
Dept: CT IMAGING | Age: 54
Discharge: HOME | End: 2024-06-12
Attending: INTERNAL MEDICINE
Payer: COMMERCIAL

## 2024-06-12 DIAGNOSIS — Z17.0 MALIGNANT NEOPLASM OF OVERLAPPING SITES OF LEFT BREAST IN FEMALE, ESTROGEN RECEPTOR POSITIVE (HCC): ICD-10-CM

## 2024-06-12 DIAGNOSIS — C50.812 MALIGNANT NEOPLASM OF OVERLAPPING SITES OF LEFT BREAST IN FEMALE, ESTROGEN RECEPTOR POSITIVE (HCC): ICD-10-CM

## 2024-06-12 PROCEDURE — 74177 CT ABD & PELVIS W/CONTRAST: CPT | Performed by: INTERNAL MEDICINE

## 2024-06-12 PROCEDURE — 71260 CT THORAX DX C+: CPT | Performed by: INTERNAL MEDICINE

## 2024-06-14 ENCOUNTER — HOSPITAL ENCOUNTER (OUTPATIENT)
Dept: NUCLEAR MEDICINE | Facility: HOSPITAL | Age: 54
Discharge: HOME | End: 2024-06-14
Attending: INTERNAL MEDICINE

## 2024-06-14 DIAGNOSIS — Z17.0 MALIGNANT NEOPLASM OF OVERLAPPING SITES OF LEFT BREAST IN FEMALE, ESTROGEN RECEPTOR POSITIVE (HCC): ICD-10-CM

## 2024-06-14 DIAGNOSIS — C50.812 MALIGNANT NEOPLASM OF OVERLAPPING SITES OF LEFT BREAST IN FEMALE, ESTROGEN RECEPTOR POSITIVE (HCC): ICD-10-CM

## 2024-06-14 PROCEDURE — 78306 BONE IMAGING WHOLE BODY: CPT | Performed by: INTERNAL MEDICINE

## 2024-06-14 PROCEDURE — 78832 RP LOCLZJ TUM SPECT W/CT 2: CPT | Performed by: INTERNAL MEDICINE

## 2024-06-18 ENCOUNTER — APPOINTMENT (OUTPATIENT)
Dept: HEMATOLOGY/ONCOLOGY | Facility: HOSPITAL | Age: 54
End: 2024-06-18
Attending: INTERNAL MEDICINE
Payer: COMMERCIAL

## 2024-06-20 ENCOUNTER — TELEPHONE (OUTPATIENT)
Dept: HEMATOLOGY/ONCOLOGY | Facility: HOSPITAL | Age: 54
End: 2024-06-20

## 2024-06-20 NOTE — TELEPHONE ENCOUNTER
Called patient to offer an appointment with Dr. Sinclair to discuss recent CT/bone scan results and the next steps in her care with chemotherapy planning. Provided verbal and emotional reassurance due to her in regards to her upcoming chemotherapy planning. Scheduled patient at the [facility] with Dr. Sinclair at 0900 on June 21, 2024. She thanked me for the phone call and assistance.

## 2024-06-21 ENCOUNTER — TELEPHONE (OUTPATIENT)
Dept: HEMATOLOGY/ONCOLOGY | Facility: HOSPITAL | Age: 54
End: 2024-06-21

## 2024-06-21 ENCOUNTER — OFFICE VISIT (OUTPATIENT)
Dept: HEMATOLOGY/ONCOLOGY | Age: 54
End: 2024-06-21
Attending: INTERNAL MEDICINE
Payer: COMMERCIAL

## 2024-06-21 VITALS
TEMPERATURE: 98 F | DIASTOLIC BLOOD PRESSURE: 84 MMHG | SYSTOLIC BLOOD PRESSURE: 142 MMHG | WEIGHT: 195.5 LBS | BODY MASS INDEX: 32.18 KG/M2 | HEART RATE: 75 BPM | OXYGEN SATURATION: 98 % | HEIGHT: 65.16 IN | RESPIRATION RATE: 18 BRPM

## 2024-06-21 DIAGNOSIS — C50.812 MALIGNANT NEOPLASM OF OVERLAPPING SITES OF LEFT BREAST IN FEMALE, ESTROGEN RECEPTOR POSITIVE (HCC): Primary | ICD-10-CM

## 2024-06-21 DIAGNOSIS — Z17.0 MALIGNANT NEOPLASM OF OVERLAPPING SITES OF LEFT BREAST IN FEMALE, ESTROGEN RECEPTOR POSITIVE (HCC): Primary | ICD-10-CM

## 2024-06-21 PROCEDURE — 99215 OFFICE O/P EST HI 40 MIN: CPT | Performed by: INTERNAL MEDICINE

## 2024-06-21 NOTE — PROGRESS NOTES
Cancer Center Progress Note    Problem List:      Patient Active Problem List   Diagnosis    Candidal intertrigo    Class 1 obesity due to excess calories without serious comorbidity with body mass index (BMI) of 30.0 to 30.9 in adult    Hot flushes, perimenopausal    Intramural leiomyoma of uterus    Cystocele, midline    Status post hysteroscopic polypectomy    Preop testing    Abnormal uterine bleeding    Menorrhagia with regular cycle    Status post endometrial ablation    Special screening for malignant neoplasm of colon    Benign neoplasm of ascending colon    History of adenomatous polyp of colon    Benign neoplasm of descending colon    Invasive ductal carcinoma of left breast (HCC)    Malignant neoplasm of overlapping sites of left breast in female, estrogen receptor positive (HCC)       Interim History:    Anna Palacios presents today for evaluation and management of a diagnosis of left breast cancer.    The patient had a metastatic work up with bone scan and CT scan of the CAP that were negative for distant metastatic disease. She is very anxious about adjuvant therapy. She presents to discuss this option and finalize a plan. She has no pain. She has no dyspnea or cough.      Review of Systems:   Constitutional: Negative for anorexia, fevers, chills, night sweats and weight loss.  Respiratory: Negative for cough, hemoptysis, chest pain, or dyspnea.  Cardiovascular: Negative for angina, orthopnea or palpitations.  Gastrointestinal: Negative for nausea, vomiting, change in bowel habits, diarrhea, constipation and abdominal pain.  Integument/breast: Negative for rash, skin lesions, and pruritus.  Musculoskeletal: Negative for myalgias, arthralgias, muscle weakness.  Psychiatric: The patient's mood was calm and appropriate for this visit.  The pertinent positives and negatives were described. All other systems were negative.    PMH/PSH:  Past Medical History:    Anesthesia complication    gets shakey    DCIS  (ductal carcinoma in situ) of breast    DUB (dysfunctional uterine bleeding)    Headache disorder    sometimes    High cholesterol    Sleep disturbance    some insomnia lately due to menopause    Visual impairment    reading glasses    Wears glasses    only for reading    Weight gain    through menopause       Past Surgical History:   Procedure Laterality Date    Colonoscopy  5-18-23    Colonoscopy      D & c  04/21/2022    Dilation/curettage,diagnostic      Endometrial ablation      Endometrial biopsy - jar(s): 2  04/21/2022    Hysteroscopy  04/21/2022    [place] teeth removed         Family History Reviewed:  Family History   Problem Relation Age of Onset    Hypertension Mother     Cancer Father 74        psa and lungs    Prostate Cancer Father     Other (lung cancer) Paternal Uncle     Other (esophagus cancer) Paternal Cousin        Allergies:     Allergies   Allergen Reactions    Penicillins ITCHING     As a child       Medications:   Calcium Carbonate (CALCIUM 600 OR) Take 600 mg by mouth daily.      Multiple Vitamins-Minerals (MULTI-VITAMIN/MINERALS) Oral Tab Take 1 tablet by mouth daily.      Cholecalciferol (VITAMIN D) 50 MCG (2000 UT) Oral Cap Take 1 capsule (2,000 Units total) by mouth daily.      Nystatin [id number] UNIT/GM External Powder Apply 1 Application topically daily.      hydrocortisone 2.5 % External Cream Use on AA QD-BID prn 30 g 2    ketoconazole 2 % External Cream Apply to AA QD-BID prn. 60 g 2         Vital Signs:      Height: 165.5 cm (5' 5.16\") (06/21 0916)  Weight: 88.7 kg (195 lb 8 oz) (06/21 0916)  BSA (Calculated - sq m): 1.96 sq meters (06/21 0916)  Pulse: 75 (06/21 0916)  BP: 142/84 (06/21 0916)  Temp: 97.8 °F (36.6 °C) (06/21 0916)  Do Not Use - Resp Rate: --  SpO2: 98 % (06/21 0916)      Performance Status:  ECOG 0: Fully active, able to carry on all pre-disease performance without restriction     Physical Examination:    Constitutional: Patient is alert and oriented x 3, not in acute  distress.  Psychiatric: The patient's mood is calm and appropriate for this visit.      Labs reviewed at this visit:     Lab Results   Component Value Date    WBC 6.5 06/04/2024    RBC 4.86 06/04/2024    HGB 14.3 06/04/2024    HCT 42.3 06/04/2024    MCV 87.0 06/04/2024    MCH 29.4 06/04/2024    MCHC 33.8 06/04/2024    RDW 11.8 06/04/2024    .0 06/04/2024     Lab Results   Component Value Date     06/04/2024    K 4.6 06/04/2024     06/04/2024    CO2 29.0 06/04/2024    BUN 15 06/04/2024    CREATSERUM 0.72 06/04/2024    GLU 91 06/04/2024    CA 10.0 06/04/2024    ALKPHO 132 (H) 06/04/2024    ALT 34 06/04/2024    AST 13 (L) 06/04/2024    BILT 0.2 06/04/2024    ALB 3.8 06/04/2024    TP 8.4 (H) 06/04/2024       Radiologic imaging reviewed at this visit:    Bone scan on 6/14/2024:  FINDINGS:    IMAGED AREA:  Whole-body   ABNORMALITIES:  Mildly increased radiotracer uptake involving the right greater than left ischial tuberosity likely due to enthesopathy at the hamstring insertion.  There is no evidence of lytic or blastic lesion to suggest metastatic disease.  There is   mild uptake noted on the right at L3-4 facet consistent with degenerative changes.  There is a few scattered areas of mildly increased uptake within the bone marrow of the midthoracic spine without any lesion seen on noncontrast CT is likely due to   physiologic uptake rather than metastatic disease..  Sclerotic foci within the left sacrum and left ilium measuring under 1 cm sized without elevated radiotracer uptake most likely represent bone islands.   OTHER:  Probable area of scarring noted within the left lateral breast.  Focal scarring also noted within the left axilla.         CT CAP on 6/12/2024:  FINDINGS:       CHEST:    LUNGS:  No visible pulmonary disease.    MEDIASTINUM:  No mass or adenopathy.    SANTANA:  No mass or adenopathy.    CARDIAC:  No enlargement, pericardial thickening, or significant coronary artery  calcification.  PLEURA:  No mass or effusion.    CHEST WALL:  There is a spiculated nodular density within the inferolateral quadrant of the left breast measuring 1.4 x 2.6 cm on axial imaging, best seen on image 75 of series 2. Please correlate clinically with patient's history of left breast cancer.   There is also evidence of left axillary lymphadenopathy, the largest lymph node measuring 1.3 x 2.5 cm in short axis and transverse dimensions respectively, best seen on image 39.  AORTA:  No aneurysm or dissection.    VASCULATURE:  No visible pulmonary arterial thrombus or attenuation.       ABDOMEN/PELVIS:  LIVER:  No enlargement, atrophy, abnormal density, or significant focal lesion.    BILIARY:  No visible dilatation or calcification.    PANCREAS:  No lesion, fluid collection, ductal dilatation, or atrophy.    SPLEEN:  No enlargement or focal lesion.    KIDNEYS:  No mass, obstruction, or calcification.    ADRENALS:  No mass or enlargement.    AORTA:  No aneurysm or dissection.    RETROPERITONEUM:  No mass or adenopathy.    BOWEL/MESENTERY:  No visible mass, obstruction, or bowel wall thickening.    ABDOMINAL WALL:  No mass or hernia.    URINARY BLADDER:  No visible focal wall thickening, lesion, or calculus.    PELVIC NODES:  No adenopathy.    PELVIC ORGANS:  There is suggestion of a uterine fibroid along the left side of the uterine body/lower uterine segment measuring 2.1 x 2.3 cm.  No free fluid or inflammatory changes within the pelvis identified.    BONES:  Exaggeration of the thoracic kyphosis with moderate to extensive multilevel disc disease of the thoracic spine.  No lytic, blastic or destructive osseous changes are identified.     Impression   CONCLUSION:    1. There is a spiculated nodular density within the inferolateral quadrant of the left breast measuring 1.3 x 2.5 cm.  2. There is left axillary lymphadenopathy, with the largest lymph node measuring 1.4 cm in short axis dimension.  3. Lungs are  clear.  4. No metastatic disease to the abdomen or pelvis suggested.  5. There is suggestion of a 2.3 cm uterine fibroid along the left side of the body/lower uterine segment.  Further assessment with pelvic ultrasound imaging for confirmation is recommended.          Assessment/Plan:     Left breast cancer in overlapping quadrants (3 o'clock):  Invasive ductal carcinoma, grade III  Lumpectomy on 4/12/2024  1.8 cm IDC  ER 15 %  IL 15%  Ki-67 >90%  Her2 1+  SLN 0/3 (1 node with ITC)  Oncotype Dx RS 69  Single gene studys on Oncotype with ER/IL/Her2 negative     The metastatic work up is negative for distant metastases. I reviewed these results with the patient. She is very anxious about adjuvant systemic therapy. She is concerned about the cardiac risk of anthracyclines. I had a long discussion about the taxotere/cyclophosphamide regimen (TC x 4). I reviewed how this is given and discussed the risks of nausea, emesis, low blood counts, hear loss, neuropathy. After a full discussion we have decided to proceed with the TC x 4 regimen. She will arrange for the portacath and we will schedule chemo education. I will give this chemotherapy with growth factor support.     Uterine fibroid:    We will eventually get pelvic us but we will first proceed with the chemotherapy.        Nilson Sinclair MD

## 2024-06-21 NOTE — PROGRESS NOTES
Patient here f/u and to discuss plan of care. Patient c/o insomnia and anxiety. Patient has no further concerns or complaints at this time.     Education Record    Learner:  Patient    Disease / Diagnosis: malignant neoplasm of breast     Barriers / Limitations:  None   Comments:    Method:  Discussion   Comments:    General Topics:  Medication and Plan of care reviewed   Comments:    Outcome:  Shows understanding   Comments:

## 2024-06-21 NOTE — TELEPHONE ENCOUNTER
Called patient and asked how her appointment to discuss chemotherapy plans with Dr. Sinclair went and she stated it went well and all of her questions got answered. She stated she was happy about the location of the [facility] is close to her home. Scheduled patient for the chemotherapy education on July 10, 2024 at the [facility]. Scheduled patient for chemotherapy with Dr. Sinclair on July 12, 2024 at the [facility]. Patient instructed that the interventional radiology department will contact her to schedule her port placement and to expect a phone call. She verbalized understanding. Patient stated she picked the July 12, 2024 date for chemotherapy due to her planned family vacation planned from June 26, 2024 through July 3, 2024. She thanked me for the phone call and assistance. I encouraged her to contacted the breast RN navigators for concerns or questions.

## 2024-06-24 NOTE — PAT NURSING NOTE
PreOp Instructions; have blood work done before procedure     You are scheduled for: an Interventional Radiology Procedure     Date of Procedure: 07/11/24. Check in at 7:30 am     Diet Instructions: Do not eat or drink anything after midnight     Medications to Stop: Hold herbal supplements and vitamins morning of procedure     Skin Prep: Shower with antibacterial soap using a clean washcloth, prior to procedure     Driving After Procedure: If sedation is given, you WILL NOT be able to drive home. You will need a responsible adult  to drive you home.     Discharge Teaching: Your nurse will give you specific instructions before discharge, Most people can resume normal activities in 2-3 days, Any questions, please call the physician's office

## 2024-07-08 ENCOUNTER — LAB ENCOUNTER (OUTPATIENT)
Dept: LAB | Age: 54
End: 2024-07-08
Attending: INTERNAL MEDICINE
Payer: COMMERCIAL

## 2024-07-08 DIAGNOSIS — Z17.0 MALIGNANT NEOPLASM OF OVERLAPPING SITES OF LEFT BREAST IN FEMALE, ESTROGEN RECEPTOR POSITIVE (HCC): ICD-10-CM

## 2024-07-08 DIAGNOSIS — C50.812 MALIGNANT NEOPLASM OF OVERLAPPING SITES OF LEFT BREAST IN FEMALE, ESTROGEN RECEPTOR POSITIVE (HCC): ICD-10-CM

## 2024-07-08 LAB
BASOPHILS # BLD AUTO: 0.04 X10(3) UL (ref 0–0.2)
BASOPHILS NFR BLD AUTO: 0.7 %
EOSINOPHIL # BLD AUTO: 0.16 X10(3) UL (ref 0–0.7)
EOSINOPHIL NFR BLD AUTO: 2.9 %
ERYTHROCYTE [DISTWIDTH] IN BLOOD BY AUTOMATED COUNT: 11.9 %
HCT VFR BLD AUTO: 43.1 %
HGB BLD-MCNC: 14.4 G/DL
IMM GRANULOCYTES # BLD AUTO: 0.02 X10(3) UL (ref 0–1)
IMM GRANULOCYTES NFR BLD: 0.4 %
LYMPHOCYTES # BLD AUTO: 1.76 X10(3) UL (ref 1–4)
LYMPHOCYTES NFR BLD AUTO: 32 %
MCH RBC QN AUTO: 29.2 PG (ref 26–34)
MCHC RBC AUTO-ENTMCNC: 33.4 G/DL (ref 31–37)
MCV RBC AUTO: 87.4 FL
MONOCYTES # BLD AUTO: 0.72 X10(3) UL (ref 0.1–1)
MONOCYTES NFR BLD AUTO: 13.1 %
NEUTROPHILS # BLD AUTO: 2.8 X10 (3) UL (ref 1.5–7.7)
NEUTROPHILS # BLD AUTO: 2.8 X10(3) UL (ref 1.5–7.7)
NEUTROPHILS NFR BLD AUTO: 50.9 %
PLATELET # BLD AUTO: 314 10(3)UL (ref 150–450)
RBC # BLD AUTO: 4.93 X10(6)UL
WBC # BLD AUTO: 5.5 X10(3) UL (ref 4–11)

## 2024-07-08 PROCEDURE — 85025 COMPLETE CBC W/AUTO DIFF WBC: CPT

## 2024-07-08 PROCEDURE — 36415 COLL VENOUS BLD VENIPUNCTURE: CPT

## 2024-07-10 ENCOUNTER — OFFICE VISIT (OUTPATIENT)
Dept: HEMATOLOGY/ONCOLOGY | Age: 54
End: 2024-07-10
Attending: INTERNAL MEDICINE
Payer: COMMERCIAL

## 2024-07-10 DIAGNOSIS — C50.812 MALIGNANT NEOPLASM OF OVERLAPPING SITES OF LEFT BREAST IN FEMALE, ESTROGEN RECEPTOR POSITIVE (HCC): Primary | ICD-10-CM

## 2024-07-10 DIAGNOSIS — Z71.9 ENCOUNTER FOR HEALTH EDUCATION: ICD-10-CM

## 2024-07-10 DIAGNOSIS — Z17.0 MALIGNANT NEOPLASM OF OVERLAPPING SITES OF LEFT BREAST IN FEMALE, ESTROGEN RECEPTOR POSITIVE (HCC): Primary | ICD-10-CM

## 2024-07-10 PROCEDURE — G2212 PROLONG OUTPT/OFFICE VIS: HCPCS | Performed by: NURSE PRACTITIONER

## 2024-07-10 PROCEDURE — 99215 OFFICE O/P EST HI 40 MIN: CPT | Performed by: NURSE PRACTITIONER

## 2024-07-10 RX ORDER — ONDANSETRON HYDROCHLORIDE 8 MG/1
8 TABLET, FILM COATED ORAL EVERY 8 HOURS PRN
Qty: 30 TABLET | Refills: 3 | Status: SHIPPED | OUTPATIENT
Start: 2024-07-10

## 2024-07-10 RX ORDER — PROCHLORPERAZINE MALEATE 10 MG
10 TABLET ORAL EVERY 6 HOURS PRN
Qty: 30 TABLET | Refills: 3 | Status: SHIPPED | OUTPATIENT
Start: 2024-07-10

## 2024-07-10 RX ORDER — DEXAMETHASONE 4 MG/1
TABLET ORAL
Qty: 24 TABLET | Refills: 1 | Status: SHIPPED | OUTPATIENT
Start: 2024-07-10

## 2024-07-10 NOTE — PROGRESS NOTES
IV Chemotherapy Education    Learner:  Patient    Barriers / Limitations:  None    Chemotherapy education goals:  Learn the drug names  Administration schedule  Routes of administration   Treatment setting    Drug names:  docetaxel + cyclophosphamide      Chemotherapy action on cancer / normal cells: Achieved      Treatment Effects on Constitution: Achieved    Anticipated fatigue   Role of activity in recovery   Notify MD/RN of inability to complete ADLs      Treatment Effects on Mucous Membranes: Achieved     Appropriate oral hygiene / signs of stomatitis and thrush  Nausea and vomiting / use of antiemetics  Diarrhea / constipation / dietary changes   Notify MD/RN of above symptoms if they persist > 24 hours      Treatment Effects on Nutritional Status: Achieved    Changes in taste perception / appetite  Access to RD for additional support  Notify MD/RN of weight loss or gain and any appetite changes      Treatment Effects on Hair: Achieved    Potential for hair loss / how to obtain a wig      Treatment Effects on the Skin: Achieved    Potential nail changes  Notify MD/RN of any new rash      Treatment Effects on Bone Marrow: Achieved    Function of white blood cells / signs of infection / role of pegfilgrastim   Use of loratadine to prevent associated body aches  Function of red blood cells / signs of anemia  Function of platelets / signs of bleeding  Notify MD/RN of any chills or fever 100.5 and above  Notify MD/RN of any bleeding      Treatment Effects on Neurological System: Achieved    Potential numbness / tingling in hands or feet  Notify MD/RN of any numbness / tingling at next visit      Treatment Effects on the Bladder and Kidneys: Achieved    Function of the kidneys and bladder  Signs / symptoms of hemorrhagic cystitis  Suggested fluid intake  Notify MD/RN if blood appears in urine or if you have a decreased urine output      Treatment Effects on Emotional Status: Achieved    Potential mood changes,  depression, nervousness, difficulty sleeping  Importance of support system  Notify MD/RN of any emotional changes      Infusion reaction: Achieved    Potential for infusion reaction, decreased risk with each treatment, how reactions are managed   Signs / symptoms include back pain, chills, dizziness, flushing, chest pain/tightness, itching, shortness of breath, nausea, vomiting  Notify MD/RN IMMEDIATELY if you have any of the above signs / symptoms      Teaching Materials Provided:     Chemotherapy information sheets from [employer] provided  Dietician information sheet  When to contact the Treatment Team Information Sheet  Side Effect Management Information Sheet  [place] Support Services Sheet  National Resources Information sheet    Patient and care partner were given ample opportunity to ask questions. All questions and concerns addressed. We discussed self care techniques, symptom management, fluid, diet, and activities.     Chemotherapy Consent Form not signed by the patient at her request, as she would like time to process information provided today. She is aware that it will need to be signed prior to treatment initiation on Friday.     I spent a total of 80 minutes with the patient, 100 % of that time was spent counseling patient regarding the above documented side effects and management, when to call provider and contact information.     Encounter Times  PreCharting: 3 minutes    Reviewing/Obtaining:   minutes      Medical Exam:   minutes    Plan:   minutes      Notes: 2 minutes    Counseling/Education: 80 minutes      Referring/Communicating:   minutes    Ind Interpretation:   minutes      Care Coordination: 5 minutes       My total time spent caring for the patient on the day of the encounter: 90 minutes.     Electronically signed:     Jelly Reardon DNP, NP-C  Nurse Practitioner  [facility]

## 2024-07-11 ENCOUNTER — SOCIAL WORK SERVICES (OUTPATIENT)
Dept: HEMATOLOGY/ONCOLOGY | Facility: HOSPITAL | Age: 54
End: 2024-07-11

## 2024-07-11 ENCOUNTER — HOSPITAL ENCOUNTER (OUTPATIENT)
Dept: INTERVENTIONAL RADIOLOGY/VASCULAR | Facility: HOSPITAL | Age: 54
Discharge: HOME | End: 2024-07-11
Attending: INTERNAL MEDICINE | Admitting: INTERNAL MEDICINE
Payer: COMMERCIAL

## 2024-07-11 VITALS
SYSTOLIC BLOOD PRESSURE: 113 MMHG | TEMPERATURE: 97 F | DIASTOLIC BLOOD PRESSURE: 68 MMHG | HEART RATE: 58 BPM | RESPIRATION RATE: 15 BRPM | OXYGEN SATURATION: 96 %

## 2024-07-11 DIAGNOSIS — C50.812 MALIGNANT NEOPLASM OF OVERLAPPING SITES OF LEFT BREAST IN FEMALE, ESTROGEN RECEPTOR POSITIVE (HCC): Primary | ICD-10-CM

## 2024-07-11 DIAGNOSIS — Z17.0 MALIGNANT NEOPLASM OF OVERLAPPING SITES OF LEFT BREAST IN FEMALE, ESTROGEN RECEPTOR POSITIVE (HCC): Primary | ICD-10-CM

## 2024-07-11 LAB — INR: 1 (ref 0.8–1.3)

## 2024-07-11 PROCEDURE — 77001 FLUOROGUIDE FOR VEIN DEVICE: CPT | Performed by: RADIOLOGY

## 2024-07-11 PROCEDURE — 99153 MOD SED SAME PHYS/QHP EA: CPT | Performed by: RADIOLOGY

## 2024-07-11 PROCEDURE — 76937 US GUIDE VASCULAR ACCESS: CPT | Performed by: RADIOLOGY

## 2024-07-11 PROCEDURE — 02HV33Z INSERTION OF INFUSION DEVICE INTO SUPERIOR VENA CAVA, PERCUTANEOUS APPROACH: ICD-10-PCS | Performed by: RADIOLOGY

## 2024-07-11 PROCEDURE — 36561 INSERT TUNNELED CV CATH: CPT | Performed by: RADIOLOGY

## 2024-07-11 PROCEDURE — 99152 MOD SED SAME PHYS/QHP 5/>YRS: CPT | Performed by: RADIOLOGY

## 2024-07-11 PROCEDURE — 85610 PROTHROMBIN TIME: CPT | Performed by: RADIOLOGY

## 2024-07-11 PROCEDURE — 0JH60XZ INSERTION OF TUNNELED VASCULAR ACCESS DEVICE INTO CHEST SUBCUTANEOUS TISSUE AND FASCIA, OPEN APPROACH: ICD-10-PCS | Performed by: RADIOLOGY

## 2024-07-11 RX ORDER — SODIUM CHLORIDE 9 MG/ML
INJECTION, SOLUTION INTRAVENOUS CONTINUOUS
Status: DISCONTINUED | OUTPATIENT
Start: 2024-07-11 | End: 2024-07-11

## 2024-07-11 RX ORDER — DIPHENHYDRAMINE HYDROCHLORIDE 50 MG/ML
INJECTION INTRAMUSCULAR; INTRAVENOUS
Status: COMPLETED
Start: 2024-07-11 | End: 2024-07-11

## 2024-07-11 RX ORDER — HEPARIN SODIUM 5000 [USP'U]/ML
INJECTION, SOLUTION INTRAVENOUS; SUBCUTANEOUS
Status: COMPLETED
Start: 2024-07-11 | End: 2024-07-11

## 2024-07-11 RX ORDER — MIDAZOLAM HYDROCHLORIDE 1 MG/ML
INJECTION INTRAMUSCULAR; INTRAVENOUS
Status: COMPLETED
Start: 2024-07-11 | End: 2024-07-11

## 2024-07-11 RX ORDER — LIDOCAINE HYDROCHLORIDE 10 MG/ML
INJECTION, SOLUTION INFILTRATION; PERINEURAL
Status: COMPLETED
Start: 2024-07-11 | End: 2024-07-11

## 2024-07-11 RX ADMIN — SODIUM CHLORIDE: 9 INJECTION, SOLUTION INTRAVENOUS at 07:45:00

## 2024-07-11 NOTE — DISCHARGE INSTRUCTIONS
Do not drive for 24 hours.    Do not drink alcohol for the next 24 hours.    Keep the \"pink\" dressing clean and dry for 5 days. Do not shower until this \"pink\" dressing is removed.    The chemo nurse will remove this dressing if you have chemo before 5 days. If you have not had a chemo session in 5 days, remove the \"pink\" dressing.     After the \"pink\" dressing is removed you may shower. Do not submerge the port site until it is completely healed.     The small white dressing on your neck can be removed tomorrow.     The small white strips (steri strips) under the \"pink\" dressing are to remain in place. DO NOT remove these strips. They will fall off on their own in a week or two.     If you have a fever >100.4 degrees, chills, signs of infection including redness, swelling, thick yellow drainage and/or a foul smell coming from the port site call your oncologist right away.     Do not make any personal/business decisions and/or sign any legal documents for the next 24 hours.

## 2024-07-11 NOTE — PROCEDURES
[facility]   part of [facility]  Procedure Note    Anna Palacios Patient Status:  Outpatient    10/9/1970 MRN [id number]   Location [facility] INTERVENTIONAL SUITES Attending No att. providers found   Hosp Day # 0 PCP Katty Blanton MD     Procedure: Chest port placement    Pre-Procedure Diagnosis:  Breast CA    Post-Procedure Diagnosis: Same    Anesthesia:  Local and sedation    Findings:  8f power port to R upper chest, tip to SVC.  OK to use    Specimens: None    Blood Loss:  Minimal    Tourniquet Time: None  Complications:  None  Drains:  None    Secondary Diagnosis:  None    Rene Bhat MD  2024

## 2024-07-11 NOTE — H&P
[facility]   part of [facility]   History & Physical    Anna Palacios Patient Status:  Outpatient    10/9/1970 MRN [id number]   Location [facility] INTERVENTIONAL SUITES Attending No att. providers found   Hosp Day # 0 PCP Katty Blanton MD     Admitting Diagnosis:   Breast CA    History of Present Illness:   54 yo F breast CA presents for port placement    History   Past Medical History:  Past Medical History:    Anesthesia complication    gets shakey    DCIS (ductal carcinoma in situ) of breast    DUB (dysfunctional uterine bleeding)    Headache disorder    sometimes    High cholesterol    Sleep disturbance    some insomnia lately due to menopause    Visual impairment    reading glasses    Wears glasses    only for reading    Weight gain    through menopause       Past Surgical History:  Past Surgical History:   Procedure Laterality Date    Colonoscopy  24    Colonoscopy      D & c  2022    Dilation/curettage,diagnostic      Endometrial ablation      Endometrial biopsy - jar(s): 2  2022    Hysteroscopy  2022    Lumpectomy left      [place] teeth removed         Social History:  Social History     Tobacco Use    Smoking status: Never     Passive exposure: Never    Smokeless tobacco: Never   Substance Use Topics    Alcohol use: Never        Family History:  Family History   Problem Relation Age of Onset    Hypertension Mother     Cancer Father 74        psa and lungs    Prostate Cancer Father     Other (lung cancer) Paternal Uncle     Other (esophagus cancer) Paternal Cousin        Allergies/Medications:   Allergies:  Allergies   Allergen Reactions    Penicillins ITCHING     As a child       Medications:    Current Outpatient Medications:     Calcium Carbonate (CALCIUM 600 OR), Take 600 mg by mouth daily., Disp: , Rfl:     Multiple Vitamins-Minerals (MULTI-VITAMIN/MINERALS) Oral Tab, Take 1 tablet by mouth daily., Disp: , Rfl:     Cholecalciferol (VITAMIN D) 50 MCG (2000)  Oral Cap, Take 1 capsule (2,000 Units total) by mouth daily., Disp: , Rfl:     prochlorperazine (COMPAZINE) 10 mg tablet, Take 1 tablet (10 mg total) by mouth every 6 (six) hours as needed for Nausea., Disp: 30 tablet, Rfl: 3    ondansetron (ZOFRAN) 8 MG tablet, Take 1 tablet (8 mg total) by mouth every 8 (eight) hours as needed for Nausea., Disp: 30 tablet, Rfl: 3    dexamethasone (DECADRON) 4 MG tablet, Take 8mg (2 tabs) twice daily for 3 days, starting the day before chemotherapy, the day of chemotherapy, and the day after chemotherapy. Repeat every 3 weeks for 4 times, Disp: 24 tablet, Rfl: 1    Nystatin 892382 UNIT/GM External Powder, Apply 1 Application topically daily., Disp: , Rfl:     hydrocortisone 2.5 % External Cream, Use on AA QD-BID prn, Disp: 30 g, Rfl: 2    ketoconazole 2 % External Cream, Apply to AA QD-BID prn., Disp: 60 g, Rfl: 2    Physical Exam & Review of Systems:   Physical Exam:    /68   Pulse 58   Temp 97.2 °F (36.2 °C) (Temporal)   Resp 15   LMP 10/01/2022 (Approximate)   SpO2 96%     General: NAD  Neck: No JVD  Lungs: CTA bilat  Heart: RRR, S1, S2  Abdomen: Soft, NT/ND, BS+x4  Extremities: Warm, dry, no LE edema bilat  Pulses: 2+ bilat DP    Results:   Labs:  Recent Labs   Lab 07/08/24  1200   RBC 4.93   HGB 14.4   HCT 43.1   MCV 87.4   MCH 29.2   MCHC 33.4   RDW 11.9   NEPRELIM 2.80   WBC 5.5   .0     Recent Labs   Lab 07/11/24  0811   INR 1.0     No results for input(s): \"GLU\", \"BUN\", \"CREATSERUM\", \"GFRAA\", \"GFRNAA\", \"CA\", \"NA\", \"K\", \"CL\", \"CO2\" in the last 168 hours.    Assessment/Plan:   Impression: 54 yo F breast CA, L    I have discussed with the patient and/or legal representative the potential benefits, risks, and side effects of this procedure, the likelihood of the patient achieving goals; and the potential problems that might occur during recuperation.  I discussed reasonable alternatives to the procedure, including risks, benefits and side effects related to  the alternatives, and risks related to not receiving this procedure.      Recommendations: Chest port placement    Rene Bhat MD  7/11/2024  12:27 PM

## 2024-07-11 NOTE — PROGRESS NOTES
Pt completed a distress screening with a score of 2 noted. Concerns for seep, fatigue noted.    SW sent a [employer] message to provide resources and SW contact information. SW provided cancer support resources including [facility] and [facility]. The packet of resources included information on transportation resources, homecare/home health agencies, Facebook support group page and counseling resources. SW reviewed Advance Directives and importance of completion. SW explained role of SW in Cancer Center and social work contact information.    [facility] Bansal [facility] [place] [address]  Ph: [phone] [email or web address]  Fax: [phone]

## 2024-07-11 NOTE — PROGRESS NOTES
Pt s/p PAC placement. Post procedure dressings to R neck and R upper chest remained CDI throughout recovery period. VSS. Pt denied pain. Tolerated PO intake. IV d/c'd. Discharge instructions given-including port card, pt verbalized understanding. Pt to lobby via WC and  to drive home.

## 2024-07-12 ENCOUNTER — OFFICE VISIT (OUTPATIENT)
Dept: HEMATOLOGY/ONCOLOGY | Age: 54
End: 2024-07-12
Attending: INTERNAL MEDICINE
Payer: COMMERCIAL

## 2024-07-12 ENCOUNTER — NURSE ONLY (OUTPATIENT)
Dept: HEMATOLOGY/ONCOLOGY | Age: 54
End: 2024-07-12
Attending: INTERNAL MEDICINE
Payer: COMMERCIAL

## 2024-07-12 VITALS
HEIGHT: 65.16 IN | HEART RATE: 75 BPM | OXYGEN SATURATION: 98 % | DIASTOLIC BLOOD PRESSURE: 79 MMHG | RESPIRATION RATE: 16 BRPM | BODY MASS INDEX: 31.85 KG/M2 | TEMPERATURE: 98 F | SYSTOLIC BLOOD PRESSURE: 129 MMHG | WEIGHT: 193.5 LBS

## 2024-07-12 DIAGNOSIS — Z17.0 MALIGNANT NEOPLASM OF OVERLAPPING SITES OF LEFT BREAST IN FEMALE, ESTROGEN RECEPTOR POSITIVE (HCC): Primary | ICD-10-CM

## 2024-07-12 DIAGNOSIS — C50.812 MALIGNANT NEOPLASM OF OVERLAPPING SITES OF LEFT BREAST IN FEMALE, ESTROGEN RECEPTOR POSITIVE (HCC): Primary | ICD-10-CM

## 2024-07-12 LAB
ALBUMIN SERPL-MCNC: 3.6 G/DL (ref 3.4–5)
ALBUMIN/GLOB SERPL: 0.8 {RATIO} (ref 1–2)
ALP LIVER SERPL-CCNC: 120 U/L
ALT SERPL-CCNC: 32 U/L
ANION GAP SERPL CALC-SCNC: 8 MMOL/L (ref 0–18)
AST SERPL-CCNC: 10 U/L (ref 15–37)
BASOPHILS # BLD AUTO: 0.02 X10(3) UL (ref 0–0.2)
BASOPHILS NFR BLD AUTO: 0.1 %
BILIRUB SERPL-MCNC: 0.2 MG/DL (ref 0.1–2)
BUN BLD-MCNC: 15 MG/DL (ref 9–23)
CALCIUM BLD-MCNC: 9.9 MG/DL (ref 8.5–10.1)
CHLORIDE SERPL-SCNC: 105 MMOL/L (ref 98–112)
CO2 SERPL-SCNC: 23 MMOL/L (ref 21–32)
CREAT BLD-MCNC: 0.94 MG/DL
EGFRCR SERPLBLD CKD-EPI 2021: 73 ML/MIN/1.73M2 (ref 60–?)
EOSINOPHIL # BLD AUTO: 0 X10(3) UL (ref 0–0.7)
EOSINOPHIL NFR BLD AUTO: 0 %
ERYTHROCYTE [DISTWIDTH] IN BLOOD BY AUTOMATED COUNT: 11.9 %
GLOBULIN PLAS-MCNC: 4.3 G/DL (ref 2.8–4.4)
GLUCOSE BLD-MCNC: 231 MG/DL (ref 70–99)
HCT VFR BLD AUTO: 42.2 %
HGB BLD-MCNC: 14.2 G/DL
IMM GRANULOCYTES # BLD AUTO: 0.08 X10(3) UL (ref 0–1)
IMM GRANULOCYTES NFR BLD: 0.5 %
LYMPHOCYTES # BLD AUTO: 0.95 X10(3) UL (ref 1–4)
LYMPHOCYTES NFR BLD AUTO: 5.6 %
MCH RBC QN AUTO: 29.3 PG (ref 26–34)
MCHC RBC AUTO-ENTMCNC: 33.6 G/DL (ref 31–37)
MCV RBC AUTO: 87.2 FL
MONOCYTES # BLD AUTO: 0.72 X10(3) UL (ref 0.1–1)
MONOCYTES NFR BLD AUTO: 4.3 %
NEUTROPHILS # BLD AUTO: 15.07 X10 (3) UL (ref 1.5–7.7)
NEUTROPHILS # BLD AUTO: 15.07 X10(3) UL (ref 1.5–7.7)
NEUTROPHILS NFR BLD AUTO: 89.5 %
OSMOLALITY SERPL CALC.SUM OF ELEC: 290 MOSM/KG (ref 275–295)
PLATELET # BLD AUTO: 346 10(3)UL (ref 150–450)
POTASSIUM SERPL-SCNC: 3.5 MMOL/L (ref 3.5–5.1)
PROT SERPL-MCNC: 7.9 G/DL (ref 6.4–8.2)
RBC # BLD AUTO: 4.84 X10(6)UL
SODIUM SERPL-SCNC: 136 MMOL/L (ref 136–145)
WBC # BLD AUTO: 16.8 X10(3) UL (ref 4–11)

## 2024-07-12 PROCEDURE — 85025 COMPLETE CBC W/AUTO DIFF WBC: CPT

## 2024-07-12 PROCEDURE — 80053 COMPREHEN METABOLIC PANEL: CPT

## 2024-07-12 PROCEDURE — 96417 CHEMO IV INFUS EACH ADDL SEQ: CPT

## 2024-07-12 PROCEDURE — 96375 TX/PRO/DX INJ NEW DRUG ADDON: CPT

## 2024-07-12 PROCEDURE — 99214 OFFICE O/P EST MOD 30 MIN: CPT | Performed by: INTERNAL MEDICINE

## 2024-07-12 PROCEDURE — 96413 CHEMO IV INFUSION 1 HR: CPT

## 2024-07-12 RX ORDER — PALONOSETRON 0.05 MG/ML
0.25 INJECTION, SOLUTION INTRAVENOUS ONCE
Status: COMPLETED | OUTPATIENT
Start: 2024-07-12 | End: 2024-07-12

## 2024-07-12 RX ORDER — PALONOSETRON 0.05 MG/ML
0.25 INJECTION, SOLUTION INTRAVENOUS ONCE
Status: CANCELLED
Start: 2024-07-12 | End: 2024-07-12

## 2024-07-12 RX ADMIN — PALONOSETRON 0.25 MG: 0.05 INJECTION, SOLUTION INTRAVENOUS at 10:34:00

## 2024-07-12 NOTE — PROGRESS NOTES
Pt here for C1D1 Drug(s)TC.  Arrives Ambulating independently, accompanied by Family member     Patient was evaluated today by MD.    Oral medications included in this regimen:  yes - dexamethasone    Patient confirms comprehension of cancer treatment schedule:  yes    Pregnancy screening:  Denies possibility of pregnancy    Modifications in dose or schedule:  No    Medications appearance and physical integrity checked by RN: yes.    Chemotherapy IV pump settings verified by 2 RNs:  Yes.  Frequency of blood return and site check throughout administration: Prior to administration, Prior to each drug, and At completion of therapy     Infusion/treatment outcome:  patient tolerated treatment without incident    Education Record    Learner:  Patient and Family Member  Barriers / Limitations:  None  Method:  Brief focused and Printed material  Education / instructions given:  reviewed port care, chemotherapy, antiemetic instructions, when to call and fu appts  Outcome:  Shows understanding    Discharged Home, Ambulating independently, accompanied by:Family member    Patient/family verbalized understanding of future appointments: by printed AVS  Pt dc home ambulatory in stable condition, no new complaints. Reviewed AVS with pt

## 2024-07-12 NOTE — PROGRESS NOTES
Patient here for C1D1 taxotere/cytoxan. Patient taking dexamethasone 4 mg bid that she states she started yesterday. Patient is also taking otc claritin for 5 days. Patient c/o port tenderness. Patient has no further concerns or complaints at this time     Education Record    Learner:  Patient    Disease / Diagnosis: malignant neoplasm of breast    Barriers / Limitations:  None   Comments:    Method:  Discussion   Comments:    General Topics:  Medication, Side effects and symptom management, and Plan of care reviewed   Comments:    Outcome:  Shows understanding   Comments:

## 2024-07-12 NOTE — PATIENT INSTRUCTIONS
Anti-nausea medication guidelines:    1. Compazine 10 mg tablet every 6 hours as needed (stop taking if feeling jittery)  2. Zofran 8 mg tablet every 8 hours as needed. You make take this in addition to Compazine.  3. Alternate every 4 hours while awake (you do not need to wake up to take this) - try taking Zofran closest to bedtime  4. Call triage RN or MD on call  (after hours) for uncontrolled nausea at [phone].    Zofran:  Next dose: 6pm    Compazine:  Next dose: 3pm and then bedtime    Sample Schedule:  8am: Compazine  12pm: Zofran  4pm: Compazine  8pm: Zofran

## 2024-07-12 NOTE — PROGRESS NOTES
Cancer Center Progress Note    Problem List:      Patient Active Problem List   Diagnosis    Candidal intertrigo    Class 1 obesity due to excess calories without serious comorbidity with body mass index (BMI) of 30.0 to 30.9 in adult    Hot flushes, perimenopausal    Intramural leiomyoma of uterus    Cystocele, midline    Status post hysteroscopic polypectomy    Preop testing    Abnormal uterine bleeding    Menorrhagia with regular cycle    Status post endometrial ablation    Special screening for malignant neoplasm of colon    Benign neoplasm of ascending colon    History of adenomatous polyp of colon    Benign neoplasm of descending colon    Invasive ductal carcinoma of left breast (HCC)    Malignant neoplasm of overlapping sites of left breast in female, estrogen receptor positive (HCC)       Interim History:    Anna Palacios presents today for evaluation and management of a diagnosis of left breast cancer.    She presents for the first cycle of TC chemotherapy. She had chemo ed with an APN. She has no new complaints. She is very anxious.    The patient had a metastatic work up with bone scan and CT scan of the CAP that were negative for distant metastatic disease. She is very anxious about adjuvant therapy. She presents to discuss this option and finalize a plan. She has no pain. She has no dyspnea or cough.      Review of Systems:   Constitutional: Negative for anorexia, fevers, chills, night sweats and weight loss.  Respiratory: Negative for cough, hemoptysis, chest pain, or dyspnea.  Cardiovascular: Negative for angina, orthopnea or palpitations.  Gastrointestinal: Negative for nausea, vomiting, change in bowel habits, diarrhea, constipation and abdominal pain.  Integument/breast: Negative for rash, skin lesions, and pruritus.  Musculoskeletal: Negative for myalgias, arthralgias, muscle weakness.  Psychiatric: The patient's mood was calm and appropriate for this visit.  The pertinent positives and negatives  were described. All other systems were negative.    PMH/PSH:  Past Medical History:    Anesthesia complication    gets shakey    DCIS (ductal carcinoma in situ) of breast    DUB (dysfunctional uterine bleeding)    Headache disorder    sometimes    High cholesterol    Sleep disturbance    some insomnia lately due to menopause    Visual impairment    reading glasses    Wears glasses    only for reading    Weight gain    through menopause       Past Surgical History:   Procedure Laterality Date    Colonoscopy  5-18-23    Colonoscopy      D & c  04/21/2022    Dilation/curettage,diagnostic      Endometrial ablation      Endometrial biopsy - jar(s): 2  04/21/2022    Hysteroscopy  04/21/2022    Lumpectomy left      [place] teeth removed         Family History Reviewed:  Family History   Problem Relation Age of Onset    Hypertension Mother     Cancer Father 74        psa and lungs    Prostate Cancer Father     Other (lung cancer) Paternal Uncle     Other (esophagus cancer) Paternal Cousin        Allergies:     Allergies   Allergen Reactions    Penicillins ITCHING     As a child       Medications:   prochlorperazine (COMPAZINE) 10 mg tablet Take 1 tablet (10 mg total) by mouth every 6 (six) hours as needed for Nausea. 30 tablet 3    ondansetron (ZOFRAN) 8 MG tablet Take 1 tablet (8 mg total) by mouth every 8 (eight) hours as needed for Nausea. 30 tablet 3    dexamethasone (DECADRON) 4 MG tablet Take 8mg (2 tabs) twice daily for 3 days, starting the day before chemotherapy, the day of chemotherapy, and the day after chemotherapy. Repeat every 3 weeks for 4 times 24 tablet 1    Calcium Carbonate (CALCIUM 600 OR) Take 600 mg by mouth daily.      Multiple Vitamins-Minerals (MULTI-VITAMIN/MINERALS) Oral Tab Take 1 tablet by mouth daily.      Cholecalciferol (VITAMIN D) 50 MCG (2000 UT) Oral Cap Take 1 capsule (2,000 Units total) by mouth daily.      Nystatin 126999 UNIT/GM External Powder Apply 1 Application topically daily.       hydrocortisone 2.5 % External Cream Use on AA QD-BID prn 30 g 2    ketoconazole 2 % External Cream Apply to AA QD-BID prn. 60 g 2         Vital Signs:      Height: 165.5 cm (5' 5.16\") (07/12 0945)  Weight: 87.8 kg (193 lb 8 oz) (07/12 0945)  BSA (Calculated - sq m): 1.95 sq meters (07/12 0945)  Pulse: 75 (07/12 1200)  BP: 129/79 (07/12 1200)  Temp: 97.9 °F (36.6 °C) (07/12 1200)  Do Not Use - Resp Rate: --  SpO2: 98 % (07/12 1200)      Performance Status:  ECOG 0: Fully active, able to carry on all pre-disease performance without restriction     Physical Examination:    Constitutional: Patient is alert and oriented x 3, not in acute distress.  Psychiatric: The patient's mood is calm and appropriate for this visit.      Labs reviewed at this visit:     Lab Results   Component Value Date    WBC 16.8 (H) 07/12/2024    RBC 4.84 07/12/2024    HGB 14.2 07/12/2024    HCT 42.2 07/12/2024    MCV 87.2 07/12/2024    MCH 29.3 07/12/2024    MCHC 33.6 07/12/2024    RDW 11.9 07/12/2024    .0 07/12/2024     Lab Results   Component Value Date     07/12/2024    K 3.5 07/12/2024     07/12/2024    CO2 23.0 07/12/2024    BUN 15 07/12/2024    CREATSERUM 0.94 07/12/2024     (H) 07/12/2024    CA 9.9 07/12/2024    ALKPHO 120 (H) 07/12/2024    ALT 32 07/12/2024    AST 10 (L) 07/12/2024    BILT 0.2 07/12/2024    ALB 3.6 07/12/2024    TP 7.9 07/12/2024       Radiologic imaging reviewed at this visit:    Bone scan on 6/14/2024:  FINDINGS:    IMAGED AREA:  Whole-body   ABNORMALITIES:  Mildly increased radiotracer uptake involving the right greater than left ischial tuberosity likely due to enthesopathy at the hamstring insertion.  There is no evidence of lytic or blastic lesion to suggest metastatic disease.  There is   mild uptake noted on the right at L3-4 facet consistent with degenerative changes.  There is a few scattered areas of mildly increased uptake within the bone marrow of the midthoracic spine without any  lesion seen on noncontrast CT is likely due to   physiologic uptake rather than metastatic disease..  Sclerotic foci within the left sacrum and left ilium measuring under 1 cm sized without elevated radiotracer uptake most likely represent bone islands.   OTHER:  Probable area of scarring noted within the left lateral breast.  Focal scarring also noted within the left axilla.         CT CAP on 6/12/2024:  FINDINGS:       CHEST:    LUNGS:  No visible pulmonary disease.    MEDIASTINUM:  No mass or adenopathy.    SANTANA:  No mass or adenopathy.    CARDIAC:  No enlargement, pericardial thickening, or significant coronary artery calcification.  PLEURA:  No mass or effusion.    CHEST WALL:  There is a spiculated nodular density within the inferolateral quadrant of the left breast measuring 1.4 x 2.6 cm on axial imaging, best seen on image 75 of series 2. Please correlate clinically with patient's history of left breast cancer.   There is also evidence of left axillary lymphadenopathy, the largest lymph node measuring 1.3 x 2.5 cm in short axis and transverse dimensions respectively, best seen on image 39.  AORTA:  No aneurysm or dissection.    VASCULATURE:  No visible pulmonary arterial thrombus or attenuation.       ABDOMEN/PELVIS:  LIVER:  No enlargement, atrophy, abnormal density, or significant focal lesion.    BILIARY:  No visible dilatation or calcification.    PANCREAS:  No lesion, fluid collection, ductal dilatation, or atrophy.    SPLEEN:  No enlargement or focal lesion.    KIDNEYS:  No mass, obstruction, or calcification.    ADRENALS:  No mass or enlargement.    AORTA:  No aneurysm or dissection.    RETROPERITONEUM:  No mass or adenopathy.    BOWEL/MESENTERY:  No visible mass, obstruction, or bowel wall thickening.    ABDOMINAL WALL:  No mass or hernia.    URINARY BLADDER:  No visible focal wall thickening, lesion, or calculus.    PELVIC NODES:  No adenopathy.    PELVIC ORGANS:  There is suggestion of a uterine  fibroid along the left side of the uterine body/lower uterine segment measuring 2.1 x 2.3 cm.  No free fluid or inflammatory changes within the pelvis identified.    BONES:  Exaggeration of the thoracic kyphosis with moderate to extensive multilevel disc disease of the thoracic spine.  No lytic, blastic or destructive osseous changes are identified.     Impression   CONCLUSION:    1. There is a spiculated nodular density within the inferolateral quadrant of the left breast measuring 1.3 x 2.5 cm.  2. There is left axillary lymphadenopathy, with the largest lymph node measuring 1.4 cm in short axis dimension.  3. Lungs are clear.  4. No metastatic disease to the abdomen or pelvis suggested.  5. There is suggestion of a 2.3 cm uterine fibroid along the left side of the body/lower uterine segment.  Further assessment with pelvic ultrasound imaging for confirmation is recommended.          Assessment/Plan:     Left breast cancer in overlapping quadrants (3 o'clock):  Invasive ductal carcinoma, grade III  Lumpectomy on 4/12/2024  1.8 cm IDC  ER 15 %  IL 15%  Ki-67 >90%  Her2 1+  SLN 0/3 (1 node with ITC)  Oncotype Dx RS 69  Single gene studys on Oncotype with ER/IL/Her2 negative     The metastatic work up was negative for distant metastases. We will proceed with the taxotere/cyclophosphamide regimen (TC x 4). I again reviewed how this is given and discussed the risks of nausea, emesis, low blood counts, hear loss, neuropathy. After a full discussion we have decided to proceed with the TC x 4 regimen. She has had the portacath. I will give this chemotherapy with growth factor support. She will return in three weeks.    We discussed the antiemetic regimen. I discussed that she can take tylenol and ibuprofen during the next 3 days for musculoskeletal pain.    Uterine fibroid:    We will eventually get pelvic us but we will first proceed with the chemotherapy.        Nilson Sinclair MD

## 2024-07-13 ENCOUNTER — OFFICE VISIT (OUTPATIENT)
Dept: HEMATOLOGY/ONCOLOGY | Facility: HOSPITAL | Age: 54
End: 2024-07-13
Attending: INTERNAL MEDICINE
Payer: COMMERCIAL

## 2024-07-13 DIAGNOSIS — C50.812 MALIGNANT NEOPLASM OF OVERLAPPING SITES OF LEFT BREAST IN FEMALE, ESTROGEN RECEPTOR POSITIVE (HCC): Primary | ICD-10-CM

## 2024-07-13 DIAGNOSIS — Z17.0 MALIGNANT NEOPLASM OF OVERLAPPING SITES OF LEFT BREAST IN FEMALE, ESTROGEN RECEPTOR POSITIVE (HCC): Primary | ICD-10-CM

## 2024-07-13 PROCEDURE — 96372 THER/PROPH/DIAG INJ SC/IM: CPT

## 2024-07-13 NOTE — PROGRESS NOTES
Education Record    Learner:  Patient     Disease / Diagnosis: breast ca    Barriers / Limitations:  None   Comments:    Method:  Discussion   Comments:    General Topics:  Medication, Side effects and symptom management, Plan of care reviewed, and Fall risk and prevention   Comments:    Outcome:  Shows understanding   Comments:    Pegfilgrastim inj administered per orders. Tolerated well. Pt aware that this inj can cause bone pain - she is taking claritin and will use tylenol prn. Discharged ambulatory in stable condition.

## 2024-07-18 ENCOUNTER — HOSPITAL ENCOUNTER (OUTPATIENT)
Age: 54
Discharge: HOME | End: 2024-07-18
Payer: COMMERCIAL

## 2024-07-18 ENCOUNTER — TELEPHONE (OUTPATIENT)
Dept: HEMATOLOGY/ONCOLOGY | Age: 54
End: 2024-07-18

## 2024-07-18 VITALS
RESPIRATION RATE: 20 BRPM | HEIGHT: 65.25 IN | WEIGHT: 195 LBS | HEART RATE: 96 BPM | DIASTOLIC BLOOD PRESSURE: 79 MMHG | OXYGEN SATURATION: 96 % | BODY MASS INDEX: 32.1 KG/M2 | SYSTOLIC BLOOD PRESSURE: 132 MMHG | TEMPERATURE: 98 F

## 2024-07-18 DIAGNOSIS — R68.89 FLU-LIKE SYMPTOMS: Primary | ICD-10-CM

## 2024-07-18 DIAGNOSIS — Z85.3 PERSONAL HISTORY OF BREAST CANCER: ICD-10-CM

## 2024-07-18 LAB
POCT INFLUENZA A: NEGATIVE
POCT INFLUENZA B: NEGATIVE
S PYO AG THROAT QL IA.RAPID: NEGATIVE
SARS-COV-2 RNA RESP QL NAA+PROBE: NOT DETECTED

## 2024-07-18 PROCEDURE — 99214 OFFICE O/P EST MOD 30 MIN: CPT

## 2024-07-18 PROCEDURE — 87651 STREP A DNA AMP PROBE: CPT | Performed by: PHYSICIAN ASSISTANT

## 2024-07-18 PROCEDURE — 99213 OFFICE O/P EST LOW 20 MIN: CPT

## 2024-07-18 PROCEDURE — 87502 INFLUENZA DNA AMP PROBE: CPT | Performed by: PHYSICIAN ASSISTANT

## 2024-07-18 RX ORDER — HYDROCODONE BITARTRATE AND ACETAMINOPHEN 5; 325 MG/1; MG/1
2 TABLET ORAL ONCE
Status: COMPLETED | OUTPATIENT
Start: 2024-07-18 | End: 2024-07-18

## 2024-07-18 NOTE — DISCHARGE INSTRUCTIONS
Please return to the ER/clinic if symptoms worsen. Follow-up with your PCP in 24-48 hours as needed.    Take Motrin and/or Tylenol as needed.  The Norco should get you through the night.  Call oncology tomorrow for further evaluation and treatment.

## 2024-07-18 NOTE — TELEPHONE ENCOUNTER
Toxicities: C1 D1 Docetaxel/Cyclophosphamide/Pegfilgrastim-cbqv on 7/13/2024    I attempted to reach Anna to do a telephone assessment. I left a voice mail message asking her to please return my call.

## 2024-07-18 NOTE — TELEPHONE ENCOUNTER
Anna [phone] I had treatment on last week. The weekend I had sore throat ,aches, Temp of 99.3 , chills .  I walked to deal with headache trying to move around more ended with a sore back. Not feeling well. So I stopped taking the steroid and nausea medication for 2 days after my treatment.   Denies:Nausea, vomiting. Thanks Vandana

## 2024-07-18 NOTE — TELEPHONE ENCOUNTER
Low Grade Fever/Chills/Headache/Runny Nose/Sore Throat/Neck Pain/Generalized Body Aches/Low Back Pain/Fatigue/Dehydration    Anna reports she had a low grade fever of 99.3 and chills last night. No fever today. She has a headache, runny nose, very sore throat, neck pain, generalized body aches, low back pain, fatigue. She denies urinary urgency, frequency or burning. She is drinking at most 48oz of fluids daily. She denies feeling light headed or dizzy. She denies head or chest congestion, cough, nausea, vomiting or diarrhea. She reports she completed her steroids as ordered. She has not taken any anti emetics since Sunday. She stopped taking claritin after 5 days. She is also taking 2 extra strength tylenol every 5 hrs for pain. She is suppose to see DIAMOND Gay tomorrow for her C1 D8 visit. She denies any sick contacts or known covid 19+ contacts. She is due for a C1 D8 follow up visit with DIAMOND Gay tomorrow.    I explained that we can't test for flu, covid, strep, RSV in the office. I recommended she present to the [facility] near her for evaluation and testing. If she is covid negative, she should keep her appointment tomorrow. She agreed with the plan.

## 2024-07-18 NOTE — ED INITIAL ASSESSMENT (HPI)
Per patient last couple days, headache, sore throat, low grade fever 99.3 last night, body aches. Pt states having chemo July 12 and growth factor sub q shot the 13th.

## 2024-07-18 NOTE — ED PROVIDER NOTES
Patient Seen in: [facility]      History     Chief Complaint   Patient presents with    Headache           Sore Throat    Fatigue            Stated Complaint: Bodyaches/Fever    Subjective:   HPI    53-year-old female with a history of malignant neoplasm to the left breast here with complaint of chills body aches and sore throat.  Patient received her first factor subcu shot meant on Saturday and has been feeling lousy.  Patient's oncologist wanted her ruled out for strep COVID and flu.  Patient has very bad body aches that she states she cannot get to sleep etc.  Patient is following up with oncology tomorrow.  Patient denies chest pain, shortness of breath, cough, abdominal pain, nausea, vomiting or diarrhea.  Afebrile.    Objective:   Past Medical History:    Anesthesia complication    gets shakey    DCIS (ductal carcinoma in situ) of breast    DUB (dysfunctional uterine bleeding)    Headache disorder    sometimes    High cholesterol    Sleep disturbance    some insomnia lately due to menopause    Visual impairment    reading glasses    Wears glasses    only for reading    Weight gain    through menopause            The patient's medication list, past medical history and social history elements  as listed in today's nurse's notes are reviewed and agree.   The patient's family history is reviewed and is noncontributory to the presenting problem, except as indicated as above.     Past Surgical History:   Procedure Laterality Date    Colonoscopy  5-18-23    Colonoscopy      D & c  04/21/2022    Dilation/curettage,diagnostic      Endometrial ablation      Endometrial biopsy - jar(s): 2  04/21/2022    Hysteroscopy  04/21/2022    Lumpectomy left      [place] teeth removed                The patient's medication list, past medical history and social history elements  as listed in today's nurse's notes are reviewed and agree.   The patient's family history is reviewed and is noncontributory to the  presenting problem, except as indicated as above.     Social History     Socioeconomic History    Marital status:     Number of children: 2   Tobacco Use    Smoking status: Never     Passive exposure: Never    Smokeless tobacco: Never   Vaping Use    Vaping status: Never Used   Substance and Sexual Activity    Alcohol use: Never    Drug use: Never    Sexual activity: Not Currently     Partners: Male   Other Topics Concern    Caffeine Concern Yes     Comment: occ pop    Exercise Yes     Comment: 3x/week or daily walking     Seat Belt Yes            The patient's medication list, past medical history and social history elements  as listed in today's nurse's notes are reviewed and agree.   The patient's family history is reviewed and is noncontributory to the presenting problem, except as indicated as above.     Review of Systems    Positive for stated Chief Complaint: Headache (/), Sore Throat, and Fatigue (/)    Other systems are as noted in HPI.  Constitutional and vital signs reviewed.      All other systems reviewed and negative except as noted above.    Physical Exam     ED Triage Vitals [07/18/24 1524]   /79   Pulse 96   Resp 20   Temp 98.2 °F (36.8 °C)   Temp src Temporal   SpO2 96 %   O2 Device None (Room air)       Current Vitals:   Vital Signs  BP: 132/79  Pulse: 96  Resp: 20  Temp: 98.2 °F (36.8 °C)  Temp src: Temporal    Oxygen Therapy  SpO2: 96 %  O2 Device: None (Room air)            Physical Exam  Vitals and nursing note reviewed.   Constitutional:       Appearance: She is well-developed.   HENT:      Head: Normocephalic.      Jaw: There is normal jaw occlusion.      Right Ear: External ear normal.      Left Ear: External ear normal.      Nose: Nose normal.      Mouth/Throat:      Lips: Pink.      Mouth: Mucous membranes are moist.      Pharynx: Oropharynx is clear.      Comments: Uvula midline: No trismus or drooling: No peritonsillar abscess noted  Eyes:      Conjunctiva/sclera:  Conjunctivae normal.      Pupils: Pupils are equal, round, and reactive to light.   Neck:      Trachea: Trachea normal.   Cardiovascular:      Rate and Rhythm: Normal rate and regular rhythm.      Heart sounds: Normal heart sounds.   Pulmonary:      Effort: Pulmonary effort is normal.      Breath sounds: Normal breath sounds.   Musculoskeletal:      Cervical back: Full passive range of motion without pain, normal range of motion and neck supple.   Skin:     General: Skin is warm.      Capillary Refill: Capillary refill takes less than 2 seconds.   Neurological:      General: No focal deficit present.      Mental Status: She is alert and oriented to person, place, and time.   Psychiatric:         Mood and Affect: Mood normal.         Behavior: Behavior normal.         Thought Content: Thought content normal.         Judgment: Judgment normal.             ED Course     Labs Reviewed   RAPID STREP A - Normal   RAPID SARS-COV-2 BY PCR - Normal   POCT FLU TEST - Normal    Narrative:     This assay is a rapid molecular in vitro test utilizing nucleic acid amplification of influenza A and B viral RNA.                      MDM   Clinical Impression: flu like symptoms/personal cancer history  Course of Treatment:   Take Motrin and/or Tylenol as needed.  The Norco should get you through the night.  Call oncology tomorrow for further evaluation and treatment.    The patient is encouraged to return if any concerning symptoms arise. Additional verbal discharge instructions are given and discussed. Discharge medications are discussed. The patient is in good condition throughout the visit today and remains so upon discharge. I discuss the plan of care with the patient, who expresses understanding. All questions and concerns are addressed to the patient's satisfaction prior to discharge today.  Previous conversations with PCP and charts were reviewed.                                           Disposition and Plan     Clinical  Impression:  1. Flu-like symptoms    2. Personal history of breast cancer         Disposition:  Discharge  7/18/2024  4:10 pm    Follow-up:  Katty Blanton MD  [address] [place] [id number] [phone]                Medications Prescribed:  Current Discharge Medication List

## 2024-07-19 ENCOUNTER — OFFICE VISIT (OUTPATIENT)
Dept: HEMATOLOGY/ONCOLOGY | Age: 54
End: 2024-07-19
Attending: INTERNAL MEDICINE
Payer: COMMERCIAL

## 2024-07-19 VITALS
RESPIRATION RATE: 18 BRPM | OXYGEN SATURATION: 97 % | HEART RATE: 103 BPM | HEIGHT: 65.16 IN | WEIGHT: 191.31 LBS | TEMPERATURE: 100 F | BODY MASS INDEX: 31.49 KG/M2 | DIASTOLIC BLOOD PRESSURE: 80 MMHG | SYSTOLIC BLOOD PRESSURE: 138 MMHG

## 2024-07-19 DIAGNOSIS — Z17.0 MALIGNANT NEOPLASM OF OVERLAPPING SITES OF LEFT BREAST IN FEMALE, ESTROGEN RECEPTOR POSITIVE (HCC): Primary | ICD-10-CM

## 2024-07-19 DIAGNOSIS — C50.812 MALIGNANT NEOPLASM OF OVERLAPPING SITES OF LEFT BREAST IN FEMALE, ESTROGEN RECEPTOR POSITIVE (HCC): Primary | ICD-10-CM

## 2024-07-19 PROCEDURE — 99214 OFFICE O/P EST MOD 30 MIN: CPT | Performed by: NURSE PRACTITIONER

## 2024-07-19 NOTE — PROGRESS NOTES
Cancer Center Progress Note    Patient Name: Anna Palacios   YOB: 1970   Medical Record Number: [id number]   CSN: [id number]   Date of visit: 7/19/2024     Chief Complaint/Reason for Visit:  Chief Complaint   Patient presents with    Follow - Up      History of Present Illness: Anna presents today for follow up. She has left breast cancer and is s/p lumpectomy on 4/12/2024. She started TC chemotherapy last week on 7/12/2024. She received pegfilgrastim. Yesterday, she called the cancer center with complaints of fever of 99.3, chills, headache, runny nose, sore throat, neck pain, generalized body aches and fatigue. She was directed to urgent care. Respiratory swabs were negative.     Today, patient reports feeling slightly better overall. She reports continued menopausal symptoms such as insomnia, hot flashes and body aches that pre-dated chemotherapy. She reports continued aching near port site, there is healing bruising.  She denies new rashes, no mouth sores. She took prophylactic antiemetics every 4 hours for 2 days. She did not have nausea or vomiting. She reports upset stomach when taking the dexamethasone.     Problem List:  Patient Active Problem List   Diagnosis    Candidal intertrigo    Class 1 obesity due to excess calories without serious comorbidity with body mass index (BMI) of 30.0 to 30.9 in adult    Hot flushes, perimenopausal    Intramural leiomyoma of uterus    Cystocele, midline    Status post hysteroscopic polypectomy    Preop testing    Abnormal uterine bleeding    Menorrhagia with regular cycle    Status post endometrial ablation    Special screening for malignant neoplasm of colon    Benign neoplasm of ascending colon    History of adenomatous polyp of colon    Benign neoplasm of descending colon    Invasive ductal carcinoma of left breast (HCC)    Malignant neoplasm of overlapping sites of left breast in female, estrogen receptor positive (HCC)        Medical History:  Past  Medical History:    Anesthesia complication    gets shakey    DCIS (ductal carcinoma in situ) of breast    DUB (dysfunctional uterine bleeding)    Headache disorder    sometimes    High cholesterol    Sleep disturbance    some insomnia lately due to menopause    Visual impairment    reading glasses    Wears glasses    only for reading    Weight gain    through menopause       Surgical History:  Past Surgical History:   Procedure Laterality Date    Colonoscopy  5-18-23    Colonoscopy      D & c  04/21/2022    Dilation/curettage,diagnostic      Endometrial ablation      Endometrial biopsy - jar(s): 2  04/21/2022    Hysteroscopy  04/21/2022    Lumpectomy left      [place] teeth removed         Allergies:  Allergies   Allergen Reactions    Penicillins ITCHING     As a child       Family History:  Family History   Problem Relation Age of Onset    Hypertension Mother     Cancer Father 74        psa and lungs    Prostate Cancer Father     Other (lung cancer) Paternal Uncle     Other (esophagus cancer) Paternal Cousin        Social History:  Social History     Socioeconomic History    Marital status:      Spouse name: Not on file    Number of children: 2    Years of education: Not on file    Highest education level: Not on file   Occupational History    Not on file   Tobacco Use    Smoking status: Never     Passive exposure: Never    Smokeless tobacco: Never   Vaping Use    Vaping status: Never Used   Substance and Sexual Activity    Alcohol use: Never    Drug use: Never    Sexual activity: Not Currently     Partners: Male   Other Topics Concern    Caffeine Concern Yes     Comment: occ pop    Exercise Yes     Comment: 3x/week or daily walking     Seat Belt Yes    Special Diet Not Asked    Stress Concern Not Asked    Weight Concern Not Asked     Service Not Asked    Blood Transfusions Not Asked    Occupational Exposure Not Asked    Hobby Hazards Not Asked    Sleep Concern Not Asked    Back Care Not Asked     Bike Helmet Not Asked    Self-Exams Not Asked   Social History Narrative    Not on file     Social Determinants of Health     Financial Resource Strain: Not on file   Food Insecurity: Not on file   Transportation Needs: Not on file   Physical Activity: Not on file   Stress: Not on file   Social Connections: Not on file   Housing Stability: Not on file       Medications:    Current Outpatient Medications:     prochlorperazine (COMPAZINE) 10 mg tablet, Take 1 tablet (10 mg total) by mouth every 6 (six) hours as needed for Nausea., Disp: 30 tablet, Rfl: 3    ondansetron (ZOFRAN) 8 MG tablet, Take 1 tablet (8 mg total) by mouth every 8 (eight) hours as needed for Nausea., Disp: 30 tablet, Rfl: 3    dexamethasone (DECADRON) 4 MG tablet, Take 8mg (2 tabs) twice daily for 3 days, starting the day before chemotherapy, the day of chemotherapy, and the day after chemotherapy. Repeat every 3 weeks for 4 times, Disp: 24 tablet, Rfl: 1    Calcium Carbonate (CALCIUM 600 OR), Take 600 mg by mouth daily., Disp: , Rfl:     Multiple Vitamins-Minerals (MULTI-VITAMIN/MINERALS) Oral Tab, Take 1 tablet by mouth daily., Disp: , Rfl:     Cholecalciferol (VITAMIN D) 50 MCG (2000 UT) Oral Cap, Take 1 capsule (2,000 Units total) by mouth daily., Disp: , Rfl:     Nystatin 145158 UNIT/GM External Powder, Apply 1 Application topically daily., Disp: , Rfl:     hydrocortisone 2.5 % External Cream, Use on AA QD-BID prn, Disp: 30 g, Rfl: 2    ketoconazole 2 % External Cream, Apply to AA QD-BID prn., Disp: 60 g, Rfl: 2    Review of Systems:  A comprehensive 14 point review of systems was completed.  Pertinent positives and negatives noted in the HPI.    Performance Status: ECOG 1    Physical Examination:  General: Patient is alert and oriented x 3, not in acute distress.  Vital Signs: Height: 165.5 cm (5' 5.16\") (07/19 0932)  Weight: 86.8 kg (191 lb 4.8 oz) (07/19 0932)  BSA (Calculated - sq m): 1.94 sq meters (07/19 0932)  Pulse: 103 (07/19  0932)  BP: 138/80 (07/19 0932)  Temp: 99.6 °F (37.6 °C) (07/19 0932)  Do Not Use - Resp Rate: --  SpO2: 97 % (07/19 0932)  HEENT: Anicteric, conjunctivae and sclerae clear, no oropharyngeal lesion/thrush, mucous membranes are moist   Chest: Clear to auscultation. Respirations unlabored.   Heart: Regular rate and rhythm.   Abdomen: Soft, non-distended, non-tender with present bowel sounds.  Extremities: No edema.  Neurological: Grossly intact.   Lymphatics: There is no palpable lymphadenopathy throughout in the cervical or supraclavicular regions.   Skin: warm, dry, no erythema or rash   Psych/Depression: mood and affect are appropriate.     Labs:     Recent Results (from the past 72 hour(s))   Rapid Strep A - ID NOW    Collection Time: 07/18/24  3:39 PM    Specimen: Throat; Other   Result Value Ref Range    Strep A by PCR Negative Negative   Rapid SARS-CoV-2 by PCR    Collection Time: 07/18/24  3:39 PM    Specimen: Nares; Other   Result Value Ref Range    Rapid SARS-CoV-2 by PCR Not Detected Not Detected   POCT Flu Test    Collection Time: 07/18/24  3:40 PM    Specimen: Nares; Other   Result Value Ref Range    POCT INFLUENZA A Negative Negative    POCT INFLUENZA B Negative Negative       Impression/Plan    Left breast cancer: s/p lumpectomy on 4/12/2024 with 1.8cm invasive ductal carcinoma, grade III, ER/TN positive, HER2 1+. Oncotype score was 69. Started adjuvant TC chemotherapy last week on 7/12/2024. Reassured patient that several side effects related to chemotherapy would continue to improve prior to next cycle.     Planned Follow Up: 8/2/2024 follow up with Dr. Sinclair, labs and chemotherapy    Risk Level: HIGH breast cancer receiving chemotherapy requiring close monitoring     The 21st Century Cures Act makes medical notes like these available to patients in the interest of transparency. Please be advised this is a medical document. Medical documents are intended to carry relevant information, facts as evident,  and the clinical opinion of the practitioner. The medical note is intended as peer to peer communication and may appear blunt or direct. It is written in medical language and may contain abbreviations or verbiage that are unfamiliar.     Electronically Signed by:    Deidra Real DNP, APRN, NP-C, AOCNP  Nurse Practitioner  [facility]

## 2024-07-19 NOTE — PROGRESS NOTES
Education Record    Learner:  Patient    Disease / Diagnosis: breast ca    Barriers / Limitations:  None   Comments:    Method:  Discussion   Comments:    General Topics:  Plan of care reviewed   Comments:    Outcome:  Shows understanding   Comments: D8 eval with APN. Pt received TC last week. Pt has been having low grade fevers. Went to urgent care yesterday and tested negative for flu/covid. Has been taking tylenol and norco. Appetite has been ok. Bowels have been ok. Rotated zofran and compazine as directed. Did not have any vomiting. Taste has been off but no mouth sores. Reports fatigue and generalized muscle aches.

## 2024-08-02 ENCOUNTER — OFFICE VISIT (OUTPATIENT)
Dept: HEMATOLOGY/ONCOLOGY | Age: 54
End: 2024-08-02
Attending: INTERNAL MEDICINE
Payer: COMMERCIAL

## 2024-08-02 VITALS
HEIGHT: 65.16 IN | OXYGEN SATURATION: 98 % | RESPIRATION RATE: 16 BRPM | HEART RATE: 102 BPM | DIASTOLIC BLOOD PRESSURE: 79 MMHG | BODY MASS INDEX: 31.93 KG/M2 | TEMPERATURE: 98 F | SYSTOLIC BLOOD PRESSURE: 149 MMHG | WEIGHT: 194 LBS

## 2024-08-02 DIAGNOSIS — C50.812 MALIGNANT NEOPLASM OF OVERLAPPING SITES OF LEFT BREAST IN FEMALE, ESTROGEN RECEPTOR POSITIVE (HCC): Primary | ICD-10-CM

## 2024-08-02 DIAGNOSIS — Z17.0 MALIGNANT NEOPLASM OF OVERLAPPING SITES OF LEFT BREAST IN FEMALE, ESTROGEN RECEPTOR POSITIVE (HCC): Primary | ICD-10-CM

## 2024-08-02 LAB
ALBUMIN SERPL-MCNC: 3.6 G/DL (ref 3.4–5)
ALBUMIN/GLOB SERPL: 0.9 {RATIO} (ref 1–2)
ALP LIVER SERPL-CCNC: 120 U/L
ALT SERPL-CCNC: 41 U/L
ANION GAP SERPL CALC-SCNC: 9 MMOL/L (ref 0–18)
AST SERPL-CCNC: 13 U/L (ref 15–37)
BASOPHILS # BLD AUTO: 0.02 X10(3) UL (ref 0–0.2)
BASOPHILS NFR BLD AUTO: 0.1 %
BILIRUB SERPL-MCNC: 0.2 MG/DL (ref 0.1–2)
BUN BLD-MCNC: 16 MG/DL (ref 9–23)
CALCIUM BLD-MCNC: 9.5 MG/DL (ref 8.5–10.1)
CHLORIDE SERPL-SCNC: 107 MMOL/L (ref 98–112)
CO2 SERPL-SCNC: 21 MMOL/L (ref 21–32)
CREAT BLD-MCNC: 0.83 MG/DL
EGFRCR SERPLBLD CKD-EPI 2021: 85 ML/MIN/1.73M2 (ref 60–?)
EOSINOPHIL # BLD AUTO: 0.01 X10(3) UL (ref 0–0.7)
EOSINOPHIL NFR BLD AUTO: 0.1 %
ERYTHROCYTE [DISTWIDTH] IN BLOOD BY AUTOMATED COUNT: 12.4 %
GLOBULIN PLAS-MCNC: 4 G/DL (ref 2.8–4.4)
GLUCOSE BLD-MCNC: 183 MG/DL (ref 70–99)
HCT VFR BLD AUTO: 38.3 %
HGB BLD-MCNC: 13.2 G/DL
IMM GRANULOCYTES # BLD AUTO: 0.12 X10(3) UL (ref 0–1)
IMM GRANULOCYTES NFR BLD: 0.8 %
LYMPHOCYTES # BLD AUTO: 1.06 X10(3) UL (ref 1–4)
LYMPHOCYTES NFR BLD AUTO: 7 %
MCH RBC QN AUTO: 30.2 PG (ref 26–34)
MCHC RBC AUTO-ENTMCNC: 34.5 G/DL (ref 31–37)
MCV RBC AUTO: 87.6 FL
MONOCYTES # BLD AUTO: 0.6 X10(3) UL (ref 0.1–1)
MONOCYTES NFR BLD AUTO: 4 %
NEUTROPHILS # BLD AUTO: 13.32 X10 (3) UL (ref 1.5–7.7)
NEUTROPHILS # BLD AUTO: 13.32 X10(3) UL (ref 1.5–7.7)
NEUTROPHILS NFR BLD AUTO: 88 %
OSMOLALITY SERPL CALC.SUM OF ELEC: 290 MOSM/KG (ref 275–295)
PLATELET # BLD AUTO: 440 10(3)UL (ref 150–450)
POTASSIUM SERPL-SCNC: 3.6 MMOL/L (ref 3.5–5.1)
PROT SERPL-MCNC: 7.6 G/DL (ref 6.4–8.2)
RBC # BLD AUTO: 4.37 X10(6)UL
SODIUM SERPL-SCNC: 137 MMOL/L (ref 136–145)
WBC # BLD AUTO: 15.1 X10(3) UL (ref 4–11)

## 2024-08-02 PROCEDURE — 96413 CHEMO IV INFUSION 1 HR: CPT

## 2024-08-02 PROCEDURE — 85025 COMPLETE CBC W/AUTO DIFF WBC: CPT

## 2024-08-02 PROCEDURE — 80053 COMPREHEN METABOLIC PANEL: CPT

## 2024-08-02 PROCEDURE — 96417 CHEMO IV INFUS EACH ADDL SEQ: CPT

## 2024-08-02 PROCEDURE — 99214 OFFICE O/P EST MOD 30 MIN: CPT | Performed by: INTERNAL MEDICINE

## 2024-08-02 PROCEDURE — 96375 TX/PRO/DX INJ NEW DRUG ADDON: CPT

## 2024-08-02 RX ORDER — PALONOSETRON 0.05 MG/ML
0.25 INJECTION, SOLUTION INTRAVENOUS ONCE
Status: CANCELLED
Start: 2024-08-02 | End: 2024-08-02

## 2024-08-02 RX ORDER — PALONOSETRON 0.05 MG/ML
0.25 INJECTION, SOLUTION INTRAVENOUS ONCE
Status: COMPLETED | OUTPATIENT
Start: 2024-08-02 | End: 2024-08-02

## 2024-08-02 RX ADMIN — PALONOSETRON 0.25 MG: 0.05 INJECTION, SOLUTION INTRAVENOUS at 11:03:00

## 2024-08-02 NOTE — PROGRESS NOTES
Cancer Center Progress Note    Problem List:      Patient Active Problem List   Diagnosis    Candidal intertrigo    Class 1 obesity due to excess calories without serious comorbidity with body mass index (BMI) of 30.0 to 30.9 in adult    Hot flushes, perimenopausal    Intramural leiomyoma of uterus    Cystocele, midline    Status post hysteroscopic polypectomy    Preop testing    Abnormal uterine bleeding    Menorrhagia with regular cycle    Status post endometrial ablation    Special screening for malignant neoplasm of colon    Benign neoplasm of ascending colon    History of adenomatous polyp of colon    Benign neoplasm of descending colon    Invasive ductal carcinoma of left breast (HCC)    Malignant neoplasm of overlapping sites of left breast in female, estrogen receptor positive (HCC)       Interim History:    Anna Palacios presents today for evaluation and management of a diagnosis of left breast cancer.    She presents for the second cycle of TC chemotherapy.     She complained of increased fatigue. She had back pain on about day 5. She went to immediate care. She was given Norco. She had some constipation on the zofran. She had minimal nausea. She has no fever but she had low grade temp with Tmax 99. She had a feeling of tightness in her throat.    The patient had a metastatic work up with bone scan and CT scan of the CAP that were negative for distant metastatic disease.       Review of Systems:   Constitutional: Negative for anorexia, chills, night sweats and weight loss.  Respiratory: Negative for cough, hemoptysis, chest pain, or dyspnea.  Cardiovascular: Negative for angina, orthopnea or palpitations.  Gastrointestinal: Negative for nausea, vomiting, change in bowel habits, diarrhea, constipation and abdominal pain.  Integument/breast: Negative for rash, skin lesions, and pruritus.  Psychiatric: The patient's mood was calm and appropriate for this visit.  The pertinent positives and negatives were  described. All other systems were negative.    PMH/PSH:  Past Medical History:    Anesthesia complication    gets shakey    DCIS (ductal carcinoma in situ) of breast    DUB (dysfunctional uterine bleeding)    Headache disorder    sometimes    High cholesterol    Sleep disturbance    some insomnia lately due to menopause    Visual impairment    reading glasses    Wears glasses    only for reading    Weight gain    through menopause       Past Surgical History:   Procedure Laterality Date    Colonoscopy  5-18-23    Colonoscopy      D & c  04/21/2022    Dilation/curettage,diagnostic      Endometrial ablation      Endometrial biopsy - jar(s): 2  04/21/2022    Hysteroscopy  04/21/2022    Lumpectomy left      [place] teeth removed         Family History Reviewed:  Family History   Problem Relation Age of Onset    Hypertension Mother     Cancer Father 74        psa and lungs    Prostate Cancer Father     Other (lung cancer) Paternal Uncle     Other (esophagus cancer) Paternal Cousin        Allergies:     Allergies   Allergen Reactions    Penicillins ITCHING     As a child       Medications:   prochlorperazine (COMPAZINE) 10 mg tablet Take 1 tablet (10 mg total) by mouth every 6 (six) hours as needed for Nausea. 30 tablet 3    ondansetron (ZOFRAN) 8 MG tablet Take 1 tablet (8 mg total) by mouth every 8 (eight) hours as needed for Nausea. 30 tablet 3    dexamethasone (DECADRON) 4 MG tablet Take 8mg (2 tabs) twice daily for 3 days, starting the day before chemotherapy, the day of chemotherapy, and the day after chemotherapy. Repeat every 3 weeks for 4 times 24 tablet 1    Calcium Carbonate (CALCIUM 600 OR) Take 600 mg by mouth daily.      Multiple Vitamins-Minerals (MULTI-VITAMIN/MINERALS) Oral Tab Take 1 tablet by mouth daily.      Cholecalciferol (VITAMIN D) 50 MCG (2000 UT) Oral Cap Take 1 capsule (2,000 Units total) by mouth daily.      Nystatin 359723 UNIT/GM External Powder Apply 1 Application topically daily.       hydrocortisone 2.5 % External Cream Use on AA QD-BID prn 30 g 2    ketoconazole 2 % External Cream Apply to AA QD-BID prn. 60 g 2         Vital Signs:      Height: 165.5 cm (5' 5.16\") (08/02 0916)  Weight: 88 kg (194 lb) (08/02 0916)  BSA (Calculated - sq m): 1.96 sq meters (08/02 0916)  Pulse: 102 (08/02 0916)  BP: 149/79 (08/02 0916)  Temp: 98 °F (36.7 °C) (08/02 0916)  Do Not Use - Resp Rate: --  SpO2: 98 % (08/02 0916)      Performance Status:  ECOG 0: Fully active, able to carry on all pre-disease performance without restriction     Physical Examination:    Constitutional: Patient is alert and oriented x 3, not in acute distress.  Respiratory: Clear to auscultation and percussion. No rales.  No wheezes.  Cardiovascular: Regular rate and rhythm. No murmurs.  Gastrointestinal: Soft, non tender with good bowel sounds.  Musculoskeletal: No edema. No calf tenderness.  Skin: No suspicious skin lesion, no rash, no ulceration.  Lymphatics: There is no palpable lymphadenopathy throughout in the cervical, supraclavicular, or axillary regions.  Psychiatric: The patient's mood is calm and appropriate for this visit.       Labs reviewed at this visit:     Lab Results   Component Value Date    WBC 15.1 (H) 08/02/2024    RBC 4.37 08/02/2024    HGB 13.2 08/02/2024    HCT 38.3 08/02/2024    MCV 87.6 08/02/2024    MCH 30.2 08/02/2024    MCHC 34.5 08/02/2024    RDW 12.4 08/02/2024    .0 08/02/2024     Lab Results   Component Value Date     08/02/2024    K 3.6 08/02/2024     08/02/2024    CO2 21.0 08/02/2024    BUN 16 08/02/2024    CREATSERUM 0.83 08/02/2024     (H) 08/02/2024    CA 9.5 08/02/2024    ALKPHO 120 (H) 08/02/2024    ALT 41 08/02/2024    AST 13 (L) 08/02/2024    BILT 0.2 08/02/2024    ALB 3.6 08/02/2024    TP 7.6 08/02/2024       Radiologic imaging reviewed at this visit:    Bone scan on 6/14/2024:  FINDINGS:    IMAGED AREA:  Whole-body   ABNORMALITIES:  Mildly increased radiotracer uptake  involving the right greater than left ischial tuberosity likely due to enthesopathy at the hamstring insertion.  There is no evidence of lytic or blastic lesion to suggest metastatic disease.  There is   mild uptake noted on the right at L3-4 facet consistent with degenerative changes.  There is a few scattered areas of mildly increased uptake within the bone marrow of the midthoracic spine without any lesion seen on noncontrast CT is likely due to   physiologic uptake rather than metastatic disease..  Sclerotic foci within the left sacrum and left ilium measuring under 1 cm sized without elevated radiotracer uptake most likely represent bone islands.   OTHER:  Probable area of scarring noted within the left lateral breast.  Focal scarring also noted within the left axilla.         CT CAP on 6/12/2024:  FINDINGS:       CHEST:    LUNGS:  No visible pulmonary disease.    MEDIASTINUM:  No mass or adenopathy.    SANTANA:  No mass or adenopathy.    CARDIAC:  No enlargement, pericardial thickening, or significant coronary artery calcification.  PLEURA:  No mass or effusion.    CHEST WALL:  There is a spiculated nodular density within the inferolateral quadrant of the left breast measuring 1.4 x 2.6 cm on axial imaging, best seen on image 75 of series 2. Please correlate clinically with patient's history of left breast cancer.   There is also evidence of left axillary lymphadenopathy, the largest lymph node measuring 1.3 x 2.5 cm in short axis and transverse dimensions respectively, best seen on image 39.  AORTA:  No aneurysm or dissection.    VASCULATURE:  No visible pulmonary arterial thrombus or attenuation.       ABDOMEN/PELVIS:  LIVER:  No enlargement, atrophy, abnormal density, or significant focal lesion.    BILIARY:  No visible dilatation or calcification.    PANCREAS:  No lesion, fluid collection, ductal dilatation, or atrophy.    SPLEEN:  No enlargement or focal lesion.    KIDNEYS:  No mass, obstruction, or  calcification.    ADRENALS:  No mass or enlargement.    AORTA:  No aneurysm or dissection.    RETROPERITONEUM:  No mass or adenopathy.    BOWEL/MESENTERY:  No visible mass, obstruction, or bowel wall thickening.    ABDOMINAL WALL:  No mass or hernia.    URINARY BLADDER:  No visible focal wall thickening, lesion, or calculus.    PELVIC NODES:  No adenopathy.    PELVIC ORGANS:  There is suggestion of a uterine fibroid along the left side of the uterine body/lower uterine segment measuring 2.1 x 2.3 cm.  No free fluid or inflammatory changes within the pelvis identified.    BONES:  Exaggeration of the thoracic kyphosis with moderate to extensive multilevel disc disease of the thoracic spine.  No lytic, blastic or destructive osseous changes are identified.     Impression   CONCLUSION:    1. There is a spiculated nodular density within the inferolateral quadrant of the left breast measuring 1.3 x 2.5 cm.  2. There is left axillary lymphadenopathy, with the largest lymph node measuring 1.4 cm in short axis dimension.  3. Lungs are clear.  4. No metastatic disease to the abdomen or pelvis suggested.  5. There is suggestion of a 2.3 cm uterine fibroid along the left side of the body/lower uterine segment.  Further assessment with pelvic ultrasound imaging for confirmation is recommended.          Assessment/Plan:     Left breast cancer in overlapping quadrants (3 o'clock):  Invasive ductal carcinoma, grade III  Lumpectomy on 4/12/2024  1.8 cm IDC  ER 15 %  IL 15%  Ki-67 >90%  Her2 1+  SLN 0/3 (1 node with ITC)  Oncotype Dx RS 69  Single gene studys on Oncotype with ER/IL/Her2 negative     She will continue with TC cycle 2. She will use tylenol, ibuprofen and hydrocodone PRN for the musculoskeletal pain that is likely secondary to the growth factor. She did not have significant neutropenia. She will adjust her anti-emetic and continue to try to manage the constipation. She will take mirilax PRN. I will see her in three  weeks. I encouraged her to increase her activity.     Uterine fibroid:    We will eventually get pelvic us but we will first proceed with the chemotherapy.        Nilson Sinclair MD

## 2024-08-02 NOTE — PROGRESS NOTES
Patient here for C2D1 taxotere/cytoxan. Patient states she had nausea with last cycle, she took zofran and compazine that helped. Patient stated that she had low grade fevers about 3 days post C1. Patient states she had nasal congestion, sore throat and fatigue. Patient went to immediate care and was negative for flu, strep and covid.. patient has good appetite and adequate fluid intake.     Education Record    Learner:  Patient    Disease / Diagnosis: breast cancer     Barriers / Limitations:  None   Comments:    Method:  Discussion   Comments:    General Topics:  Medication, Side effects and symptom management, and Plan of care reviewed   Comments:    Outcome:  Shows understanding   Comments:

## 2024-08-02 NOTE — PROGRESS NOTES
Pt here for C2D1 Drug(s)TC.  Arrives Ambulating independently, accompanied by self /family    Patient was evaluated today by Treatment RN    Oral medications included in this regimen: see MAR    Patient confirms comprehension of cancer treatment schedule: yes    Pregnancy screening: denies pregnancy    Modifications in dose or schedule: no    Medications appearance and physical integrity checked by RN: yes    Chemotherapy IV pump settings verified by 2 RNs: yes  Frequency of blood return and site check throughout administration: prior to administration and every 2-3 ml if ivp    Infusion/treatment outcome: pt  tolerated treatment with no c/o.     Education Record    Learner: patient  Barriers / Limitations:none  Method: verbal  Education / instructions given:  chemo administration  Outcome: pt verbalized understanding    Discharged home    Patient/family verbalized understanding of future appointments: yes

## 2024-08-03 ENCOUNTER — OFFICE VISIT (OUTPATIENT)
Dept: HEMATOLOGY/ONCOLOGY | Facility: HOSPITAL | Age: 54
End: 2024-08-03
Attending: INTERNAL MEDICINE
Payer: COMMERCIAL

## 2024-08-03 DIAGNOSIS — C50.812 MALIGNANT NEOPLASM OF OVERLAPPING SITES OF LEFT BREAST IN FEMALE, ESTROGEN RECEPTOR POSITIVE (HCC): Primary | ICD-10-CM

## 2024-08-03 DIAGNOSIS — Z17.0 MALIGNANT NEOPLASM OF OVERLAPPING SITES OF LEFT BREAST IN FEMALE, ESTROGEN RECEPTOR POSITIVE (HCC): Primary | ICD-10-CM

## 2024-08-03 PROCEDURE — 96372 THER/PROPH/DIAG INJ SC/IM: CPT

## 2024-08-03 NOTE — PROGRESS NOTES
Pt here for C2D2 Drug(s)Nivestym.  Arrives Ambulating independently, accompanied by Self     Patient was evaluated today by Treatment Nurse.    Oral medications included in this regimen:  yes - dexamethasone    Patient confirms comprehension of cancer treatment schedule:  yes    Pregnancy screening:  Denies possibility of pregnancy    Modifications in dose or schedule:  No    Medications appearance and physical integrity checked by RN: yes.    Chemotherapy IV pump settings verified by 2 RNs:  n/a - cancer treatment injection administered.  Frequency of blood return and site check throughout administration: Prior to administration     Infusion/treatment outcome:  patient tolerated treatment without incident    Education Record    Learner:  Patient  Barriers / Limitations:  None  Method:  Brief focused and Discussion  Education / instructions given:  Plan of care and next appointments reviewed.pt states she has been taking claritin. Overall feeling a little better than her first treatment. States she is taking her dexamethasone. Some nausea. Encouraged to take her nausea medication, small frequent meal and drink fluids. Instructed to call for any questions or concerns.  Outcome:  Shows understanding    Discharged Home, Ambulating independently, accompanied by:Self in stable condition, no new complaints.    Patient/family verbalized understanding of future appointments: by MyChart messaging

## 2024-08-23 ENCOUNTER — OFFICE VISIT (OUTPATIENT)
Dept: HEMATOLOGY/ONCOLOGY | Age: 54
End: 2024-08-23
Attending: INTERNAL MEDICINE
Payer: COMMERCIAL

## 2024-08-23 VITALS
HEART RATE: 102 BPM | TEMPERATURE: 98 F | OXYGEN SATURATION: 97 % | BODY MASS INDEX: 31.85 KG/M2 | DIASTOLIC BLOOD PRESSURE: 84 MMHG | HEIGHT: 65.16 IN | RESPIRATION RATE: 16 BRPM | WEIGHT: 193.5 LBS | SYSTOLIC BLOOD PRESSURE: 147 MMHG

## 2024-08-23 DIAGNOSIS — Z17.0 MALIGNANT NEOPLASM OF OVERLAPPING SITES OF LEFT BREAST IN FEMALE, ESTROGEN RECEPTOR POSITIVE (HCC): Primary | ICD-10-CM

## 2024-08-23 DIAGNOSIS — C50.812 MALIGNANT NEOPLASM OF OVERLAPPING SITES OF LEFT BREAST IN FEMALE, ESTROGEN RECEPTOR POSITIVE (HCC): Primary | ICD-10-CM

## 2024-08-23 LAB
ALBUMIN SERPL-MCNC: 3.4 G/DL (ref 3.4–5)
ALBUMIN/GLOB SERPL: 0.9 {RATIO} (ref 1–2)
ALP LIVER SERPL-CCNC: 105 U/L
ALT SERPL-CCNC: 30 U/L
ANION GAP SERPL CALC-SCNC: 7 MMOL/L (ref 0–18)
AST SERPL-CCNC: 11 U/L (ref 15–37)
BASOPHILS # BLD AUTO: 0.01 X10(3) UL (ref 0–0.2)
BASOPHILS NFR BLD AUTO: 0.1 %
BILIRUB SERPL-MCNC: 0.2 MG/DL (ref 0.1–2)
BUN BLD-MCNC: 13 MG/DL (ref 9–23)
CALCIUM BLD-MCNC: 9.6 MG/DL (ref 8.5–10.1)
CHLORIDE SERPL-SCNC: 108 MMOL/L (ref 98–112)
CO2 SERPL-SCNC: 23 MMOL/L (ref 21–32)
CREAT BLD-MCNC: 1.02 MG/DL
EGFRCR SERPLBLD CKD-EPI 2021: 66 ML/MIN/1.73M2 (ref 60–?)
EOSINOPHIL # BLD AUTO: 0 X10(3) UL (ref 0–0.7)
EOSINOPHIL NFR BLD AUTO: 0 %
ERYTHROCYTE [DISTWIDTH] IN BLOOD BY AUTOMATED COUNT: 13.6 %
GLOBULIN PLAS-MCNC: 3.9 G/DL (ref 2.8–4.4)
GLUCOSE BLD-MCNC: 185 MG/DL (ref 70–99)
HCT VFR BLD AUTO: 35.8 %
HGB BLD-MCNC: 12.4 G/DL
IMM GRANULOCYTES # BLD AUTO: 0.1 X10(3) UL (ref 0–1)
IMM GRANULOCYTES NFR BLD: 0.8 %
LYMPHOCYTES # BLD AUTO: 0.81 X10(3) UL (ref 1–4)
LYMPHOCYTES NFR BLD AUTO: 6.8 %
MCH RBC QN AUTO: 30.3 PG (ref 26–34)
MCHC RBC AUTO-ENTMCNC: 34.6 G/DL (ref 31–37)
MCV RBC AUTO: 87.5 FL
MONOCYTES # BLD AUTO: 0.4 X10(3) UL (ref 0.1–1)
MONOCYTES NFR BLD AUTO: 3.4 %
NEUTROPHILS # BLD AUTO: 10.57 X10 (3) UL (ref 1.5–7.7)
NEUTROPHILS # BLD AUTO: 10.57 X10(3) UL (ref 1.5–7.7)
NEUTROPHILS NFR BLD AUTO: 88.9 %
OSMOLALITY SERPL CALC.SUM OF ELEC: 291 MOSM/KG (ref 275–295)
PLATELET # BLD AUTO: 353 10(3)UL (ref 150–450)
POTASSIUM SERPL-SCNC: 3.7 MMOL/L (ref 3.5–5.1)
PROT SERPL-MCNC: 7.3 G/DL (ref 6.4–8.2)
RBC # BLD AUTO: 4.09 X10(6)UL
SODIUM SERPL-SCNC: 138 MMOL/L (ref 136–145)
WBC # BLD AUTO: 11.9 X10(3) UL (ref 4–11)

## 2024-08-23 PROCEDURE — 96413 CHEMO IV INFUSION 1 HR: CPT

## 2024-08-23 PROCEDURE — 96417 CHEMO IV INFUS EACH ADDL SEQ: CPT

## 2024-08-23 PROCEDURE — 96375 TX/PRO/DX INJ NEW DRUG ADDON: CPT

## 2024-08-23 PROCEDURE — 85025 COMPLETE CBC W/AUTO DIFF WBC: CPT

## 2024-08-23 PROCEDURE — 99214 OFFICE O/P EST MOD 30 MIN: CPT | Performed by: INTERNAL MEDICINE

## 2024-08-23 PROCEDURE — 80053 COMPREHEN METABOLIC PANEL: CPT

## 2024-08-23 RX ORDER — PALONOSETRON 0.05 MG/ML
0.25 INJECTION, SOLUTION INTRAVENOUS ONCE
Status: COMPLETED | OUTPATIENT
Start: 2024-08-23 | End: 2024-08-23

## 2024-08-23 RX ORDER — PALONOSETRON 0.05 MG/ML
0.25 INJECTION, SOLUTION INTRAVENOUS ONCE
Status: CANCELLED
Start: 2024-08-23 | End: 2024-08-23

## 2024-08-23 RX ORDER — METOCLOPRAMIDE 10 MG/1
10 TABLET ORAL 4 TIMES DAILY PRN
Qty: 30 TABLET | Refills: 1 | Status: SHIPPED | OUTPATIENT
Start: 2024-08-23

## 2024-08-23 RX ADMIN — PALONOSETRON 0.25 MG: 0.05 INJECTION, SOLUTION INTRAVENOUS at 10:18:00

## 2024-08-23 NOTE — PROGRESS NOTES
Patient here for C3D1 taxotere/cytoxan. Patient states she had nausea after C2. Patient c/o constipation. Patient taking Miralax. Patient denies fever, pain. Patient states she has a good appetite and adequate fluid intake. Patient has no further concerns or complaints at this time.    Education Record    Learner:  Patient and Spouse    Disease / Diagnosis: breast cancer     Barriers / Limitations:  None   Comments:    Method:  Discussion   Comments:    General Topics:  Medication, Side effects and symptom management, and Plan of care reviewed   Comments:    Outcome:  Shows understanding   Comments:

## 2024-08-23 NOTE — PROGRESS NOTES
Upon, removing port needle, pt states \"is it normal to have redness with treatment?\" Pt states she gets redness to her face and neck and it has happened with each treatment. Explained that it could be from one of her chemo drugs. Pt denied any pain, itching, shortness of breath. Pt states the redness gets more pronounced after chemo and lasts a few days then goes away. Noted redness to neck and base of neck. Pt states she takes a daily claritin. Dr Yahir Fitch's RN and frannie Galeas's RN notified. Per [place], pt to call if she feels worse.  Pt instructed to call with any worsening symptoms. Verbalized understanding.

## 2024-08-23 NOTE — PROGRESS NOTES
Pt here for C3 D1 Drug(s): Taxotere/Cytoxan.  Arrives Ambulating independently, accompanied by Spouse     Patient was evaluated today by MD and Treatment Nurse.    Oral medications included in this regimen:  yes - dexamethasone    Patient confirms comprehension of cancer treatment schedule:  yes    Pregnancy screening:  Denies possibility of pregnancy    Modifications in dose or schedule:  No    Medications appearance and physical integrity checked by RN: yes.    Chemotherapy IV pump settings verified by 2 RNs:  Yes.  Frequency of blood return and site check throughout administration: Prior to administration, Prior to each drug, and At completion of therapy     Infusion/treatment outcome:  patient tolerated treatment without incident    Education Record    Learner:  Patient and Spouse  Barriers / Limitations:  None  Method:  Discussion  Education / instructions given:  Reviewed plan of care and follow up appts.   Outcome:  Shows understanding    Discharged Home, Ambulating independently, accompanied by:Spouse    Patient/family verbalized understanding of future appointments: by printed AVS

## 2024-08-23 NOTE — PROGRESS NOTES
Cancer Center Progress Note    Problem List:      Patient Active Problem List   Diagnosis    Candidal intertrigo    Class 1 obesity due to excess calories without serious comorbidity with body mass index (BMI) of 30.0 to 30.9 in adult    Hot flushes, perimenopausal    Intramural leiomyoma of uterus    Cystocele, midline    Status post hysteroscopic polypectomy    Preop testing    Abnormal uterine bleeding    Menorrhagia with regular cycle    Status post endometrial ablation    Special screening for malignant neoplasm of colon    Benign neoplasm of ascending colon    History of adenomatous polyp of colon    Benign neoplasm of descending colon    Invasive ductal carcinoma of left breast (HCC)    Malignant neoplasm of overlapping sites of left breast in female, estrogen receptor positive (HCC)       Interim History:    Anna Palacios presents today for evaluation and management of a diagnosis of left breast cancer.    She presents for the third cycle of TC chemotherapy. She did better with this cycle. She had less pain and muscle spasm. She had constipation. She takes miralax PRN. She is fearful of getting diarrhea. She has no abdominal pain. She has had fatigue. She has no new pain. She has no mouth pain.    The patient had a metastatic work up with bone scan and CT scan of the CAP that were negative for distant metastatic disease.       Review of Systems:   Constitutional: Negative for anorexia, chills, night sweats and weight loss.  Respiratory: Negative for cough, hemoptysis, chest pain, or dyspnea.  Cardiovascular: Negative for angina, orthopnea or palpitations.  Gastrointestinal: Negative for nausea, vomiting, change in bowel habits, diarrhea, constipation and abdominal pain.  Integument/breast: Negative for rash, skin lesions, and pruritus.  Psychiatric: The patient's mood was calm and appropriate for this visit.  The pertinent positives and negatives were described. All other systems were  negative.    PMH/PSH:  Past Medical History:    Anesthesia complication    gets shakey    DCIS (ductal carcinoma in situ) of breast    DUB (dysfunctional uterine bleeding)    Headache disorder    sometimes    High cholesterol    Sleep disturbance    some insomnia lately due to menopause    Visual impairment    reading glasses    Wears glasses    only for reading    Weight gain    through menopause       Past Surgical History:   Procedure Laterality Date    Colonoscopy  5-18-23    Colonoscopy      D & c  04/21/2022    Dilation/curettage,diagnostic      Endometrial ablation      Endometrial biopsy - jar(s): 2  04/21/2022    Hysteroscopy  04/21/2022    Lumpectomy left      [place] teeth removed         Family History Reviewed:  Family History   Problem Relation Age of Onset    Hypertension Mother     Cancer Father 74        psa and lungs    Prostate Cancer Father     Other (lung cancer) Paternal Uncle     Other (esophagus cancer) Paternal Cousin        Allergies:     Allergies   Allergen Reactions    Penicillins ITCHING     As a child       Medications:   metoclopramide 10 MG Oral Tab Take 1 tablet (10 mg total) by mouth 4 (four) times daily as needed (nausea). 30 tablet 1         Vital Signs:      Height: 165.5 cm (5' 5.16\") (08/23 0905)  Weight: 87.8 kg (193 lb 8 oz) (08/23 0905)  BSA (Calculated - sq m): 1.95 sq meters (08/23 0905)  Pulse: 102 (08/23 0905)  BP: 147/84 (08/23 0905)  Temp: 97.8 °F (36.6 °C) (08/23 0905)  Do Not Use - Resp Rate: --  SpO2: 97 % (08/23 0905)      Performance Status:  ECOG 0: Fully active, able to carry on all pre-disease performance without restriction     Physical Examination:    Constitutional: Patient is alert and oriented x 3, not in acute distress.  Respiratory: Clear to auscultation and percussion. No rales.  No wheezes.  Cardiovascular: Regular rate and rhythm. No murmurs.  Gastrointestinal: Soft, non tender with good bowel sounds.  Musculoskeletal: No edema. No calf  tenderness.  Skin: No suspicious skin lesion, no rash, no ulceration.  Lymphatics: There is no palpable lymphadenopathy throughout in the cervical, supraclavicular, or axillary regions.  Psychiatric: The patient's mood is calm and appropriate for this visit.       Labs reviewed at this visit:     Lab Results   Component Value Date    WBC 11.9 (H) 08/23/2024    RBC 4.09 08/23/2024    HGB 12.4 08/23/2024    HCT 35.8 08/23/2024    MCV 87.5 08/23/2024    MCH 30.3 08/23/2024    MCHC 34.6 08/23/2024    RDW 13.6 08/23/2024    .0 08/23/2024     Lab Results   Component Value Date     08/23/2024    K 3.7 08/23/2024     08/23/2024    CO2 23.0 08/23/2024    BUN 13 08/23/2024    CREATSERUM 1.02 08/23/2024     (H) 08/23/2024    CA 9.6 08/23/2024    ALKPHO 105 08/23/2024    ALT 30 08/23/2024    AST 11 (L) 08/23/2024    BILT 0.2 08/23/2024    ALB 3.4 08/23/2024    TP 7.3 08/23/2024       Radiologic imaging reviewed at this visit:    Bone scan on 6/14/2024:  FINDINGS:    IMAGED AREA:  Whole-body   ABNORMALITIES:  Mildly increased radiotracer uptake involving the right greater than left ischial tuberosity likely due to enthesopathy at the hamstring insertion.  There is no evidence of lytic or blastic lesion to suggest metastatic disease.  There is   mild uptake noted on the right at L3-4 facet consistent with degenerative changes.  There is a few scattered areas of mildly increased uptake within the bone marrow of the midthoracic spine without any lesion seen on noncontrast CT is likely due to   physiologic uptake rather than metastatic disease..  Sclerotic foci within the left sacrum and left ilium measuring under 1 cm sized without elevated radiotracer uptake most likely represent bone islands.   OTHER:  Probable area of scarring noted within the left lateral breast.  Focal scarring also noted within the left axilla.         CT CAP on 6/12/2024:  FINDINGS:       CHEST:    LUNGS:  No visible pulmonary  disease.    MEDIASTINUM:  No mass or adenopathy.    SANTANA:  No mass or adenopathy.    CARDIAC:  No enlargement, pericardial thickening, or significant coronary artery calcification.  PLEURA:  No mass or effusion.    CHEST WALL:  There is a spiculated nodular density within the inferolateral quadrant of the left breast measuring 1.4 x 2.6 cm on axial imaging, best seen on image 75 of series 2. Please correlate clinically with patient's history of left breast cancer.   There is also evidence of left axillary lymphadenopathy, the largest lymph node measuring 1.3 x 2.5 cm in short axis and transverse dimensions respectively, best seen on image 39.  AORTA:  No aneurysm or dissection.    VASCULATURE:  No visible pulmonary arterial thrombus or attenuation.       ABDOMEN/PELVIS:  LIVER:  No enlargement, atrophy, abnormal density, or significant focal lesion.    BILIARY:  No visible dilatation or calcification.    PANCREAS:  No lesion, fluid collection, ductal dilatation, or atrophy.    SPLEEN:  No enlargement or focal lesion.    KIDNEYS:  No mass, obstruction, or calcification.    ADRENALS:  No mass or enlargement.    AORTA:  No aneurysm or dissection.    RETROPERITONEUM:  No mass or adenopathy.    BOWEL/MESENTERY:  No visible mass, obstruction, or bowel wall thickening.    ABDOMINAL WALL:  No mass or hernia.    URINARY BLADDER:  No visible focal wall thickening, lesion, or calculus.    PELVIC NODES:  No adenopathy.    PELVIC ORGANS:  There is suggestion of a uterine fibroid along the left side of the uterine body/lower uterine segment measuring 2.1 x 2.3 cm.  No free fluid or inflammatory changes within the pelvis identified.    BONES:  Exaggeration of the thoracic kyphosis with moderate to extensive multilevel disc disease of the thoracic spine.  No lytic, blastic or destructive osseous changes are identified.     Impression   CONCLUSION:    1. There is a spiculated nodular density within the inferolateral quadrant of the  left breast measuring 1.3 x 2.5 cm.  2. There is left axillary lymphadenopathy, with the largest lymph node measuring 1.4 cm in short axis dimension.  3. Lungs are clear.  4. No metastatic disease to the abdomen or pelvis suggested.  5. There is suggestion of a 2.3 cm uterine fibroid along the left side of the body/lower uterine segment.  Further assessment with pelvic ultrasound imaging for confirmation is recommended.          Assessment/Plan:     Left breast cancer in overlapping quadrants (3 o'clock):  Invasive ductal carcinoma, grade III  Lumpectomy on 4/12/2024  1.8 cm IDC  ER 15 %  WA 15%  Ki-67 >90%  Her2 1+  SLN 0/3 (1 node with ITC)  Oncotype Dx RS 69  Single gene studys on Oncotype with ER/WA/Her2 negative     She will continue with TC cycle 3. She has had constipation. I will have her switch to reglan for her nausea. I also encouraged her to take mirlax starting tonight. She will contact us if she has new concerns. The plan is for four cycles of the TC chemotherapy. She is tolerating this reasonably well.     Uterine fibroid:    We will eventually get pelvic us but we will first proceed with the chemotherapy.        Nilson Sinclair MD

## 2024-08-24 ENCOUNTER — OFFICE VISIT (OUTPATIENT)
Dept: HEMATOLOGY/ONCOLOGY | Facility: HOSPITAL | Age: 54
End: 2024-08-24
Attending: INTERNAL MEDICINE
Payer: COMMERCIAL

## 2024-08-24 DIAGNOSIS — Z17.0 MALIGNANT NEOPLASM OF OVERLAPPING SITES OF LEFT BREAST IN FEMALE, ESTROGEN RECEPTOR POSITIVE (HCC): Primary | ICD-10-CM

## 2024-08-24 DIAGNOSIS — C50.812 MALIGNANT NEOPLASM OF OVERLAPPING SITES OF LEFT BREAST IN FEMALE, ESTROGEN RECEPTOR POSITIVE (HCC): Primary | ICD-10-CM

## 2024-08-24 PROCEDURE — 96372 THER/PROPH/DIAG INJ SC/IM: CPT

## 2024-09-11 ENCOUNTER — TELEPHONE (OUTPATIENT)
Dept: RADIATION ONCOLOGY | Facility: HOSPITAL | Age: 54
End: 2024-09-11

## 2024-09-11 ENCOUNTER — TELEPHONE (OUTPATIENT)
Dept: HEMATOLOGY/ONCOLOGY | Facility: HOSPITAL | Age: 54
End: 2024-09-11

## 2024-09-11 NOTE — TELEPHONE ENCOUNTER
Called patient and notified her that the radiation department to schedule a consultation. She stated she has many questions for the radiation oncologist and is unsure about proceeding with RT. We discussed how her last chemotherapy is on Friday September 13, 2024 and she stated she is grateful for it to be done. She mentioned discussing with Dr. Sinclair about removing her port when she feels better from her last treatment and I stated that I can help expedite an appointment with interventional radiology and notify Dr. Sinclair of her request. She thanked me for the courtesy phone call and assistance.

## 2024-09-13 ENCOUNTER — OFFICE VISIT (OUTPATIENT)
Dept: HEMATOLOGY/ONCOLOGY | Age: 54
End: 2024-09-13
Attending: INTERNAL MEDICINE
Payer: COMMERCIAL

## 2024-09-13 VITALS
DIASTOLIC BLOOD PRESSURE: 88 MMHG | HEIGHT: 65.16 IN | BODY MASS INDEX: 31.67 KG/M2 | WEIGHT: 192.38 LBS | RESPIRATION RATE: 18 BRPM | OXYGEN SATURATION: 97 % | SYSTOLIC BLOOD PRESSURE: 152 MMHG | HEART RATE: 96 BPM | TEMPERATURE: 98 F

## 2024-09-13 DIAGNOSIS — T45.1X5A CHEMOTHERAPY INDUCED NAUSEA AND VOMITING: ICD-10-CM

## 2024-09-13 DIAGNOSIS — C50.812 MALIGNANT NEOPLASM OF OVERLAPPING SITES OF LEFT BREAST IN FEMALE, ESTROGEN RECEPTOR POSITIVE (HCC): Primary | ICD-10-CM

## 2024-09-13 DIAGNOSIS — Z17.0 MALIGNANT NEOPLASM OF OVERLAPPING SITES OF LEFT BREAST IN FEMALE, ESTROGEN RECEPTOR POSITIVE (HCC): Primary | ICD-10-CM

## 2024-09-13 DIAGNOSIS — R11.2 CHEMOTHERAPY INDUCED NAUSEA AND VOMITING: ICD-10-CM

## 2024-09-13 DIAGNOSIS — C50.912 INVASIVE DUCTAL CARCINOMA OF LEFT BREAST (HCC): ICD-10-CM

## 2024-09-13 LAB
ALBUMIN SERPL-MCNC: 3.5 G/DL (ref 3.4–5)
ALBUMIN/GLOB SERPL: 1 {RATIO} (ref 1–2)
ALP LIVER SERPL-CCNC: 94 U/L
ALT SERPL-CCNC: 27 U/L
ANION GAP SERPL CALC-SCNC: 8 MMOL/L (ref 0–18)
AST SERPL-CCNC: 11 U/L (ref 15–37)
BASOPHILS # BLD AUTO: 0.01 X10(3) UL (ref 0–0.2)
BASOPHILS NFR BLD AUTO: 0.1 %
BILIRUB SERPL-MCNC: 0.2 MG/DL (ref 0.1–2)
BUN BLD-MCNC: 14 MG/DL (ref 9–23)
CALCIUM BLD-MCNC: 9.7 MG/DL (ref 8.5–10.1)
CHLORIDE SERPL-SCNC: 107 MMOL/L (ref 98–112)
CO2 SERPL-SCNC: 24 MMOL/L (ref 21–32)
CREAT BLD-MCNC: 0.85 MG/DL
EGFRCR SERPLBLD CKD-EPI 2021: 82 ML/MIN/1.73M2 (ref 60–?)
EOSINOPHIL # BLD AUTO: 0 X10(3) UL (ref 0–0.7)
EOSINOPHIL NFR BLD AUTO: 0 %
ERYTHROCYTE [DISTWIDTH] IN BLOOD BY AUTOMATED COUNT: 14.9 %
FASTING STATUS PATIENT QL REPORTED: NO
GLOBULIN PLAS-MCNC: 3.6 G/DL (ref 2.8–4.4)
GLUCOSE BLD-MCNC: 176 MG/DL (ref 70–99)
HCT VFR BLD AUTO: 35.3 %
HGB BLD-MCNC: 12.1 G/DL
IMM GRANULOCYTES # BLD AUTO: 0.06 X10(3) UL (ref 0–1)
IMM GRANULOCYTES NFR BLD: 0.6 %
LYMPHOCYTES # BLD AUTO: 0.63 X10(3) UL (ref 1–4)
LYMPHOCYTES NFR BLD AUTO: 6 %
MCH RBC QN AUTO: 30.4 PG (ref 26–34)
MCHC RBC AUTO-ENTMCNC: 34.3 G/DL (ref 31–37)
MCV RBC AUTO: 88.7 FL
MONOCYTES # BLD AUTO: 0.46 X10(3) UL (ref 0.1–1)
MONOCYTES NFR BLD AUTO: 4.4 %
NEUTROPHILS # BLD AUTO: 9.41 X10 (3) UL (ref 1.5–7.7)
NEUTROPHILS # BLD AUTO: 9.41 X10(3) UL (ref 1.5–7.7)
NEUTROPHILS NFR BLD AUTO: 88.9 %
OSMOLALITY SERPL CALC.SUM OF ELEC: 293 MOSM/KG (ref 275–295)
PLATELET # BLD AUTO: 350 10(3)UL (ref 150–450)
POTASSIUM SERPL-SCNC: 3.5 MMOL/L (ref 3.5–5.1)
PROT SERPL-MCNC: 7.1 G/DL (ref 6.4–8.2)
RBC # BLD AUTO: 3.98 X10(6)UL
SODIUM SERPL-SCNC: 139 MMOL/L (ref 136–145)
WBC # BLD AUTO: 10.6 X10(3) UL (ref 4–11)

## 2024-09-13 PROCEDURE — 80053 COMPREHEN METABOLIC PANEL: CPT

## 2024-09-13 PROCEDURE — 96375 TX/PRO/DX INJ NEW DRUG ADDON: CPT

## 2024-09-13 PROCEDURE — 96413 CHEMO IV INFUSION 1 HR: CPT

## 2024-09-13 PROCEDURE — 85025 COMPLETE CBC W/AUTO DIFF WBC: CPT

## 2024-09-13 PROCEDURE — 99215 OFFICE O/P EST HI 40 MIN: CPT | Performed by: INTERNAL MEDICINE

## 2024-09-13 PROCEDURE — 96417 CHEMO IV INFUS EACH ADDL SEQ: CPT

## 2024-09-13 RX ORDER — PALONOSETRON 0.05 MG/ML
0.25 INJECTION, SOLUTION INTRAVENOUS ONCE
Status: CANCELLED
Start: 2024-09-13 | End: 2024-09-13

## 2024-09-13 RX ORDER — PALONOSETRON 0.05 MG/ML
0.25 INJECTION, SOLUTION INTRAVENOUS ONCE
Status: COMPLETED | OUTPATIENT
Start: 2024-09-13 | End: 2024-09-13

## 2024-09-13 RX ORDER — HYDROCODONE BITARTRATE AND ACETAMINOPHEN 5; 325 MG/1; MG/1
1-2 TABLET ORAL EVERY 6 HOURS PRN
Qty: 10 TABLET | Refills: 0 | Status: SHIPPED | OUTPATIENT
Start: 2024-09-13

## 2024-09-13 RX ADMIN — PALONOSETRON 0.25 MG: 0.05 INJECTION, SOLUTION INTRAVENOUS at 11:21:00

## 2024-09-13 NOTE — PROGRESS NOTES
Cancer Center Progress Note    Problem List:      Patient Active Problem List   Diagnosis    Candidal intertrigo    Class 1 obesity due to excess calories without serious comorbidity with body mass index (BMI) of 30.0 to 30.9 in adult    Hot flushes, perimenopausal    Intramural leiomyoma of uterus    Cystocele, midline    Status post hysteroscopic polypectomy    Preop testing    Abnormal uterine bleeding    Menorrhagia with regular cycle    Status post endometrial ablation    Special screening for malignant neoplasm of colon    Benign neoplasm of ascending colon    History of adenomatous polyp of colon    Benign neoplasm of descending colon    Invasive ductal carcinoma of left breast (HCC)    Malignant neoplasm of overlapping sites of left breast in female, estrogen receptor positive (HCC)       Interim History:    Anna Palacios presents today for evaluation and management of a diagnosis of left breast cancer.    She presents for the fourth cycle of TC chemotherapy. She took reglan for nausea. She had less constipation.     She did better with this cycle. She had less pain and muscle spasm. She had constipation. She takes miralax PRN. She has no abdominal pain. She has had fatigue. She has no new pain. She has no mouth pain.    The patient had a metastatic work up with bone scan and CT scan of the CAP that were negative for distant metastatic disease.       Review of Systems:   Constitutional: Negative for anorexia, chills, night sweats and weight loss.  Respiratory: Negative for cough, hemoptysis, chest pain, or dyspnea.  Cardiovascular: Negative for angina, orthopnea or palpitations.  Gastrointestinal: Negative for nausea, vomiting, change in bowel habits, diarrhea, constipation and abdominal pain.  Integument/breast: Negative for rash, skin lesions, and pruritus.  Psychiatric: The patient's mood was calm and appropriate for this visit.  The pertinent positives and negatives were described. All other systems were  negative.    PMH/PSH:  Past Medical History:    Anesthesia complication    gets shakey    DCIS (ductal carcinoma in situ) of breast    DUB (dysfunctional uterine bleeding)    Headache disorder    sometimes    High cholesterol    Sleep disturbance    some insomnia lately due to menopause    Visual impairment    reading glasses    Wears glasses    only for reading    Weight gain    through menopause       Past Surgical History:   Procedure Laterality Date    Colonoscopy  5-18-23    Colonoscopy      D & c  04/21/2022    Dilation/curettage,diagnostic      Endometrial ablation      Endometrial biopsy - jar(s): 2  04/21/2022    Hysteroscopy  04/21/2022    Lumpectomy left      [place] teeth removed         Family History Reviewed:  Family History   Problem Relation Age of Onset    Hypertension Mother     Cancer Father 74        psa and lungs    Prostate Cancer Father     Other (lung cancer) Paternal Uncle     Other (esophagus cancer) Paternal Cousin        Allergies:     Allergies   Allergen Reactions    Penicillins ITCHING     As a child       Medications:   metoclopramide 10 MG Oral Tab Take 1 tablet (10 mg total) by mouth 4 (four) times daily as needed (nausea). 30 tablet 1    prochlorperazine (COMPAZINE) 10 mg tablet Take 1 tablet (10 mg total) by mouth every 6 (six) hours as needed for Nausea. 30 tablet 3    ondansetron (ZOFRAN) 8 MG tablet Take 1 tablet (8 mg total) by mouth every 8 (eight) hours as needed for Nausea. 30 tablet 3    dexamethasone (DECADRON) 4 MG tablet Take 8mg (2 tabs) twice daily for 3 days, starting the day before chemotherapy, the day of chemotherapy, and the day after chemotherapy. Repeat every 3 weeks for 4 times 24 tablet 1    Calcium Carbonate (CALCIUM 600 OR) Take 600 mg by mouth daily.      Multiple Vitamins-Minerals (MULTI-VITAMIN/MINERALS) Oral Tab Take 1 tablet by mouth daily.      Cholecalciferol (VITAMIN D) 50 MCG (2000 UT) Oral Cap Take 1 capsule (2,000 Units total) by mouth daily.       Nystatin 726000 UNIT/GM External Powder Apply 1 Application topically daily.      hydrocortisone 2.5 % External Cream Use on AA QD-BID prn 30 g 2    ketoconazole 2 % External Cream Apply to AA QD-BID prn. 60 g 2         Vital Signs:      Height: 165.5 cm (5' 5.16\") (09/13 0959)  Weight: 87.3 kg (192 lb 6.4 oz) (09/13 0959)  BSA (Calculated - sq m): 1.95 sq meters (09/13 0959)  Pulse: 96 (09/13 0959)  BP: 152/88 (09/13 0959)  Temp: 97.8 °F (36.6 °C) (09/13 0959)  Do Not Use - Resp Rate: --  SpO2: 97 % (09/13 0959)      Performance Status:  ECOG 0: Fully active, able to carry on all pre-disease performance without restriction     Physical Examination:    Constitutional: Patient is alert and oriented x 3, not in acute distress.  Respiratory: Clear to auscultation and percussion. No rales.  No wheezes.  Cardiovascular: Regular rate and rhythm. No murmurs.  Gastrointestinal: Soft, non tender with good bowel sounds.  Musculoskeletal: No edema. No calf tenderness.  Skin: No suspicious skin lesion, no rash, no ulceration.  Lymphatics: There is no palpable lymphadenopathy throughout in the cervical, supraclavicular, or axillary regions.  Psychiatric: The patient's mood is calm and appropriate for this visit.       Labs reviewed at this visit:     Lab Results   Component Value Date    WBC 10.6 09/13/2024    RBC 3.98 09/13/2024    HGB 12.1 09/13/2024    HCT 35.3 09/13/2024    MCV 88.7 09/13/2024    MCH 30.4 09/13/2024    MCHC 34.3 09/13/2024    RDW 14.9 09/13/2024    .0 09/13/2024     Lab Results   Component Value Date     09/13/2024    K 3.5 09/13/2024     09/13/2024    CO2 24.0 09/13/2024    BUN 14 09/13/2024    CREATSERUM 0.85 09/13/2024     (H) 09/13/2024    CA 9.7 09/13/2024    ALKPHO 94 09/13/2024    ALT 27 09/13/2024    AST 11 (L) 09/13/2024    BILT 0.2 09/13/2024    ALB 3.5 09/13/2024    TP 7.1 09/13/2024       Radiologic imaging reviewed at this visit:    Bone scan on 6/14/2024:  FINDINGS:     IMAGED AREA:  Whole-body   ABNORMALITIES:  Mildly increased radiotracer uptake involving the right greater than left ischial tuberosity likely due to enthesopathy at the hamstring insertion.  There is no evidence of lytic or blastic lesion to suggest metastatic disease.  There is   mild uptake noted on the right at L3-4 facet consistent with degenerative changes.  There is a few scattered areas of mildly increased uptake within the bone marrow of the midthoracic spine without any lesion seen on noncontrast CT is likely due to   physiologic uptake rather than metastatic disease..  Sclerotic foci within the left sacrum and left ilium measuring under 1 cm sized without elevated radiotracer uptake most likely represent bone islands.   OTHER:  Probable area of scarring noted within the left lateral breast.  Focal scarring also noted within the left axilla.         CT CAP on 6/12/2024:  FINDINGS:       CHEST:    LUNGS:  No visible pulmonary disease.    MEDIASTINUM:  No mass or adenopathy.    SANTANA:  No mass or adenopathy.    CARDIAC:  No enlargement, pericardial thickening, or significant coronary artery calcification.  PLEURA:  No mass or effusion.    CHEST WALL:  There is a spiculated nodular density within the inferolateral quadrant of the left breast measuring 1.4 x 2.6 cm on axial imaging, best seen on image 75 of series 2. Please correlate clinically with patient's history of left breast cancer.   There is also evidence of left axillary lymphadenopathy, the largest lymph node measuring 1.3 x 2.5 cm in short axis and transverse dimensions respectively, best seen on image 39.  AORTA:  No aneurysm or dissection.    VASCULATURE:  No visible pulmonary arterial thrombus or attenuation.       ABDOMEN/PELVIS:  LIVER:  No enlargement, atrophy, abnormal density, or significant focal lesion.    BILIARY:  No visible dilatation or calcification.    PANCREAS:  No lesion, fluid collection, ductal dilatation, or atrophy.     SPLEEN:  No enlargement or focal lesion.    KIDNEYS:  No mass, obstruction, or calcification.    ADRENALS:  No mass or enlargement.    AORTA:  No aneurysm or dissection.    RETROPERITONEUM:  No mass or adenopathy.    BOWEL/MESENTERY:  No visible mass, obstruction, or bowel wall thickening.    ABDOMINAL WALL:  No mass or hernia.    URINARY BLADDER:  No visible focal wall thickening, lesion, or calculus.    PELVIC NODES:  No adenopathy.    PELVIC ORGANS:  There is suggestion of a uterine fibroid along the left side of the uterine body/lower uterine segment measuring 2.1 x 2.3 cm.  No free fluid or inflammatory changes within the pelvis identified.    BONES:  Exaggeration of the thoracic kyphosis with moderate to extensive multilevel disc disease of the thoracic spine.  No lytic, blastic or destructive osseous changes are identified.     Impression   CONCLUSION:    1. There is a spiculated nodular density within the inferolateral quadrant of the left breast measuring 1.3 x 2.5 cm.  2. There is left axillary lymphadenopathy, with the largest lymph node measuring 1.4 cm in short axis dimension.  3. Lungs are clear.  4. No metastatic disease to the abdomen or pelvis suggested.  5. There is suggestion of a 2.3 cm uterine fibroid along the left side of the body/lower uterine segment.  Further assessment with pelvic ultrasound imaging for confirmation is recommended.          Assessment/Plan:     Left breast cancer in overlapping quadrants (3 o'clock):  Invasive ductal carcinoma, grade III  Lumpectomy on 4/12/2024  1.8 cm IDC  ER 15 %  NV 15%  Ki-67 >90%  Her2 1+  SLN 0/3 (1 node with ITC)  Oncotype Dx RS 69  Single gene studys on Oncotype with ER/NV/Her2 negative     She will continue with TC cycle 4. She will continue with reglan PRN for nausea. The plan is for four cycles of the TC chemotherapy. She is tolerating this reasonably well. I recommended radiation oncology consulation. She is reluctant to have radiation. I  discussed the risk of local recurrence and the benefit of radiation. She was worried about side effects. I discussed the benefit of adjuvant endocrine therapy. She has low level of ER/GA and oncotype was negative for ER/GA expression. We will again discuss this in three to four weeks.     Uterine fibroid:    We will eventually get pelvic us but we will first proceed with the chemotherapy. She has gyne follow up for work up.        Nilson Sinclair MD

## 2024-09-13 NOTE — PROGRESS NOTES
Pt here for C4D1 Drug(s)taxotere,cytoxan.  Arrives Ambulating independently, accompanied by Spouse     Patient was evaluated today by MD.    Oral medications included in this regimen:  yes - dexamethasone    Patient confirms comprehension of cancer treatment schedule:  yes    Pregnancy screening:  Denies possibility of pregnancy    Modifications in dose or schedule:  No    Medications appearance and physical integrity checked by RN: yes.    Chemotherapy IV pump settings verified by 2 RNs:  Yes.  Frequency of blood return and site check throughout administration: Prior to administration, Prior to each drug, and At completion of therapy     Infusion/treatment outcome:  patient tolerated treatment without incident    Education Record    Learner:  Patient and Spouse  Barriers / Limitations:  None  Method:  Brief focused and Printed material  Education / instructions given:  reviewed up and informed pt rad/onc tried to call her and sent her a MyChart to get her scheduled and if she could return their messages so she can get in with consultation   Outcome:  Shows understanding    Discharged Home, Ambulating independently, accompanied by:Spouse    Patient/family verbalized understanding of future appointments: by printed AVS  Pt dc home ambulatory in stable condition, no new complaints.

## 2024-09-13 NOTE — PROGRESS NOTES
Patient here for C4D1 taxotere/cytoxan. Patient states she had mild nausea and states Reglan helped and did not constipate her. Patient states she has pain in her mouth but no evidence of mouth sores. Patient states she felt like her throat was tight and sore 10 days post treatment that has since resolved. Patient states she has bilateral leg pain that she takes norco that offers her relief. Patient denies fever. Patient states she took her decadron as directed. Patient's skin is visibly red that started yesterday after she took the decadron. Patient has good appetite and adequate fluid intake.     Education Record    Learner:  Patient and Spouse    Disease / Diagnosis: breast cancer     Barriers / Limitations:  None   Comments:    Method:  Discussion   Comments:    General Topics:  Medication, Side effects and symptom management, and Plan of care reviewed   Comments:    Outcome:  Shows understanding   Comments:

## 2024-09-14 ENCOUNTER — OFFICE VISIT (OUTPATIENT)
Dept: HEMATOLOGY/ONCOLOGY | Facility: HOSPITAL | Age: 54
End: 2024-09-14
Attending: INTERNAL MEDICINE
Payer: COMMERCIAL

## 2024-09-14 DIAGNOSIS — Z17.0 MALIGNANT NEOPLASM OF OVERLAPPING SITES OF LEFT BREAST IN FEMALE, ESTROGEN RECEPTOR POSITIVE (HCC): Primary | ICD-10-CM

## 2024-09-14 DIAGNOSIS — C50.812 MALIGNANT NEOPLASM OF OVERLAPPING SITES OF LEFT BREAST IN FEMALE, ESTROGEN RECEPTOR POSITIVE (HCC): Primary | ICD-10-CM

## 2024-09-14 PROCEDURE — 96372 THER/PROPH/DIAG INJ SC/IM: CPT

## 2024-09-20 NOTE — CONSULTS
[facility]  Radiation Oncology New Consultation      Patient Name: Anna Palacios   MRN: [id number]  PCP: Katty Blanton MD  Referring Physician: Lucero Davenport MD; Nilson Sinclair MD    Diagnosis Site: left breast  Stage: pT1c (1.8 cm) N0i+ (1/3 with ITCs) M0  Histology: IDC, grade 3, ER/IA+, HER2-, Ki-67 >90%    Reason for Consultation: discussion of adjuvant RT    HPI: The patient is a 53 year old female who was seen today for consultation regarding the above diagnosis.     March 2024: screening mammogram with asymmetries in the L breast    April 2024: diagnostic imaging confirming a 1.5 cm nodule at 3:00 in the L breast, 6cm FN    4/12/24: L breast US guided biopsy  -IDC, grade 3, largest focus 0.7 cm  -disease ER/IA low positive, HER2-, Ki-67 >90%    5/16/24: L breast lumpectomy  -IDC with prominent lymphocytic response, grade 3, largest size 1.8 cm with satellite nodule up to 0.6 cm  -margins negative  -1/3 LN with ITCs  -no LVSI    Oncotype 68  Completed adjuvant chemo with TC x4 on 9/13.   Given that her disease is almost phenotypically triple negative, medonc considering omission of endocrine therapy     Achy from [facility]   Occ discomfort to the breast     Past Medical History  Past Medical History:    Anesthesia complication    gets shakey    Breast cancer (HCC)    DCIS (ductal carcinoma in situ) of breast    DUB (dysfunctional uterine bleeding)    Headache disorder    sometimes    High cholesterol    Sleep disturbance    some insomnia lately due to menopause    Visual impairment    reading glasses    Wears glasses    only for reading    Weight gain    through menopause       Past Surgical History  Past Surgical History:   Procedure Laterality Date    Colonoscopy  5-18-23    Colonoscopy      D & c  04/21/2022    Dilation/curettage,diagnostic      Endometrial ablation      Endometrial biopsy - jar(s): 2  04/21/2022    Hysteroscopy  04/21/2022    Lumpectomy left      [place] teeth removed          Medications  Current Outpatient Medications   Medication Sig Dispense Refill    Calcium Carbonate (CALCIUM 600 OR) Take 600 mg by mouth daily.      Multiple Vitamins-Minerals (MULTI-VITAMIN/MINERALS) Oral Tab Take 1 tablet by mouth daily.      Cholecalciferol (VITAMIN D) 50 MCG (2000 UT) Oral Cap Take 1 capsule (2,000 Units total) by mouth daily.      Nystatin 856236 UNIT/GM External Powder Apply 1 Application topically daily.      hydrocortisone 2.5 % External Cream Use on AA QD-BID prn 30 g 2    ketoconazole 2 % External Cream Apply to AA QD-BID prn. 60 g 2    HYDROcodone-acetaminophen 5-325 MG Oral Tab Take 1-2 tablets by mouth every 6 (six) hours as needed for Pain. (Patient not taking: Reported on 2024) 10 tablet 0    metoclopramide 10 MG Oral Tab Take 1 tablet (10 mg total) by mouth 4 (four) times daily as needed (nausea). (Patient not taking: Reported on 2024) 30 tablet 1    prochlorperazine (COMPAZINE) 10 mg tablet Take 1 tablet (10 mg total) by mouth every 6 (six) hours as needed for Nausea. (Patient not taking: Reported on 2024) 30 tablet 3    ondansetron (ZOFRAN) 8 MG tablet Take 1 tablet (8 mg total) by mouth every 8 (eight) hours as needed for Nausea. (Patient not taking: Reported on 2024) 30 tablet 3    dexamethasone (DECADRON) 4 MG tablet Take 8mg (2 tabs) twice daily for 3 days, starting the day before chemotherapy, the day of chemotherapy, and the day after chemotherapy. Repeat every 3 weeks for 4 times (Patient not taking: Reported on 2024) 24 tablet 1       Allergies  Allergies   Allergen Reactions    Penicillins ITCHING     As a child       OB/GYN History  Pt is a   Pt was 28 years old at time of first pregnancy.    She has cumulative breastfeeding history of 10 months.  She achieved menarche at age 14 and LMP age 52, pt underwent ablation   She denies any history of hormone replacement therapy   She has history of oral contraceptive use for 1 years, last in  .  She denies infertility treatment to achieve pregnancy.    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     No prior history of radiation.  No history of lupus, collagen-vascular disease, or inflammatory bowel disease.     Physical Exam  Vitals:    24 1041   BP: 119/75   Pulse: 90   Resp: 20   Temp: 98.5 °F (36.9 °C)     ECO    Gen: NAD  HEENT: NCAT, EOMI  Neck: no LAD  CV: RRR  Lungs: CTAB  Ext: wwp, no cyanosis or edema  Neuro: CN II-XII grossly intact  Breast: L breast with normal contours, no masses or LAD    Assessment: 54yo F with imaging detected L breast cancer. She underwent margin negative lumpectomy and SLNB showing high grade IDC, pT1c N0i+ disease - ER/MO low positive, HER2- with very high Ki-67 and Oncotype. She completed adjuvant chemo and presents to discuss RT.     Plan:     I discussed the above clinical situation with the patient and her daughter at the time of consultation.     I recommended a course of adjuvant external beam radiotherapy to the left breast using a hypofractionated approach over 4 weeks. The prone position will likely be utilized for cardiac and pulmonary sparing.     The purpose of radiotherapy is to reduce the risk of local recurrence. A meta-analysis and randomized studies have reported improved local control rate and survival with adjuvant radiation in invasive disease.      We discussed the potential short-term and long-term side effects of radiotherapy. All questions were  answered, and no guarantee of safety or efficacy was made. Alternatives to radiotherapy were discussed with the patient.    The patient is considering whether she wants to move forward with RT.  We asked her to reach back out within the next week with her decision.     Aure Fuentes MD  Radiation Oncology    MDM high including chart review, personal review of imaging, and time spent with the patient in counseling.

## 2024-09-27 ENCOUNTER — HOSPITAL ENCOUNTER (OUTPATIENT)
Dept: RADIATION ONCOLOGY | Facility: HOSPITAL | Age: 54
Discharge: HOME | End: 2024-09-27
Attending: INTERNAL MEDICINE
Payer: COMMERCIAL

## 2024-09-27 VITALS
RESPIRATION RATE: 20 BRPM | BODY MASS INDEX: 32.29 KG/M2 | SYSTOLIC BLOOD PRESSURE: 119 MMHG | WEIGHT: 193.81 LBS | OXYGEN SATURATION: 97 % | TEMPERATURE: 99 F | DIASTOLIC BLOOD PRESSURE: 75 MMHG | HEIGHT: 65 IN | HEART RATE: 90 BPM

## 2024-09-27 DIAGNOSIS — C50.912 MALIGNANT NEOPLASM OF LEFT BREAST IN FEMALE, ESTROGEN RECEPTOR POSITIVE, UNSPECIFIED SITE OF BREAST (HCC): Primary | ICD-10-CM

## 2024-09-27 DIAGNOSIS — Z17.0 MALIGNANT NEOPLASM OF LEFT BREAST IN FEMALE, ESTROGEN RECEPTOR POSITIVE, UNSPECIFIED SITE OF BREAST (HCC): Primary | ICD-10-CM

## 2024-09-27 PROCEDURE — 99214 OFFICE O/P EST MOD 30 MIN: CPT

## 2024-09-27 NOTE — PROGRESS NOTES
Nursing Consultation Note  Patient: Anna Palacios  YOB: 1970  Age: 53 year old  Radiation Oncologist: Dr. Aure Fuentes  Referring Physician: Dr. Ethel Granado  Diagnosis: LEFT BREAST CANCER  Consult Date: 9/27/2024      Chemotherapy: s/p TC x 4 cycles on 9/13/24  Labs: Reviewed  Imaging: Reviewed  Is the patient of child-bearing age?         No  Menarche: 12-15 y/o  Menopause in 10/2022 related to ablation  OCP use x 5 months  Breastfeed: YES  BRA SIZE: 36/38B    Has the patient received radiation therapy in the past? no  Does the patient have an implantable device?No   Patient has/has had:     1. Assistive Devices: N/A    2. Flu Vaccination: yes    3. Pneumonia Vaccination:  no--referral to ask PCP    Vital Signs:   Vitals:    09/27/24 1041   BP: 119/75   Pulse: 90   Resp: 20   Temp: 98.5 °F (36.9 °C)   ,   Wt Readings from Last 6 Encounters:   09/27/24 87.9 kg (193 lb 12.8 oz)   09/13/24 87.3 kg (192 lb 6.4 oz)   08/23/24 87.8 kg (193 lb 8 oz)   08/02/24 88 kg (194 lb)   07/19/24 86.8 kg (191 lb 4.8 oz)   07/18/24 88.5 kg (195 lb)       Nursing Note: Presented with mammo-detected L breast asymmetry. Diagnostic mammo done, then biopsy on 4/12/24 showed IDC gr 3, ER/AK+ and Her2 not amplified. Met with Dr. Davenport, underwent lumpectomy with SLN biopsy on 5/16/24. Path showed IDC gr 3, isolated tumor cells in 1/3 LN and clear margins. Oncotype sent, RS = 68. Met with Dr. Sinclair, recommended metastatic work-up and chemo. Started TC in July, fourth cycle given on 9/13/24. Referred for RT consult. Pt here with daughter, AOx4. Has occ discomfort felt to breast, no c/o edema or erythema. Has good but mild limitation to ROM of LUE. Still with fatigue and generalized aching from chemo.        Review of Systems   Constitutional:  Positive for fatigue.   HENT: Negative.     Eyes: Negative.    Respiratory: Negative.     Cardiovascular: Negative.    Gastrointestinal: Negative.    Endocrine: Negative.     Genitourinary: Negative.    Musculoskeletal: Negative.    Skin: Negative.    Allergic/Immunologic: Negative.    Neurological: Negative.    Hematological: Negative.    Psychiatric/Behavioral: Negative.            Allergies:  Allergies   Allergen Reactions    Penicillins ITCHING     As a child       Current Outpatient Medications   Medication Sig Dispense Refill    Calcium Carbonate (CALCIUM 600 OR) Take 600 mg by mouth daily.      Multiple Vitamins-Minerals (MULTI-VITAMIN/MINERALS) Oral Tab Take 1 tablet by mouth daily.      Cholecalciferol (VITAMIN D) 50 MCG (2000 UT) Oral Cap Take 1 capsule (2,000 Units total) by mouth daily.      Nystatin 066955 UNIT/GM External Powder Apply 1 Application topically daily.      hydrocortisone 2.5 % External Cream Use on AA QD-BID prn 30 g 2    ketoconazole 2 % External Cream Apply to AA QD-BID prn. 60 g 2    HYDROcodone-acetaminophen 5-325 MG Oral Tab Take 1-2 tablets by mouth every 6 (six) hours as needed for Pain. (Patient not taking: Reported on 9/27/2024) 10 tablet 0    metoclopramide 10 MG Oral Tab Take 1 tablet (10 mg total) by mouth 4 (four) times daily as needed (nausea). (Patient not taking: Reported on 9/27/2024) 30 tablet 1    prochlorperazine (COMPAZINE) 10 mg tablet Take 1 tablet (10 mg total) by mouth every 6 (six) hours as needed for Nausea. (Patient not taking: Reported on 9/27/2024) 30 tablet 3    ondansetron (ZOFRAN) 8 MG tablet Take 1 tablet (8 mg total) by mouth every 8 (eight) hours as needed for Nausea. (Patient not taking: Reported on 9/27/2024) 30 tablet 3    dexamethasone (DECADRON) 4 MG tablet Take 8mg (2 tabs) twice daily for 3 days, starting the day before chemotherapy, the day of chemotherapy, and the day after chemotherapy. Repeat every 3 weeks for 4 times (Patient not taking: Reported on 9/27/2024) 24 tablet 1       Preferred Pharmacy:    [id number] [address] AT [facility] [address], [phone],  [phone]  [address] [facility] [address]  Phone: [phone] Fax: [phone]      Past Medical History:    Anesthesia complication    gets shakey    Breast cancer (HCC)    DCIS (ductal carcinoma in situ) of breast    DUB (dysfunctional uterine bleeding)    Headache disorder    sometimes    High cholesterol    Sleep disturbance    some insomnia lately due to menopause    Visual impairment    reading glasses    Wears glasses    only for reading    Weight gain    through menopause       Past Surgical History:   Procedure Laterality Date    Colonoscopy  5-18-23    Colonoscopy      D & c  04/21/2022    Dilation/curettage,diagnostic      Endometrial ablation      Endometrial biopsy - jar(s): 2  04/21/2022    Hysteroscopy  04/21/2022    Lumpectomy left      [place] teeth removed         Social History     Socioeconomic History    Marital status:      Spouse name: Not on file    Number of children: 2    Years of education: Not on file    Highest education level: Not on file   Occupational History    Occupation: worked as a teacher - on leave   Tobacco Use    Smoking status: Never     Passive exposure: Never    Smokeless tobacco: Never   Vaping Use    Vaping status: Never Used   Substance and Sexual Activity    Alcohol use: Never    Drug use: Never    Sexual activity: Not Currently     Partners: Male   Other Topics Concern    Caffeine Concern Yes     Comment: occ pop    Exercise Yes     Comment: 3x/week or daily walking     Seat Belt Yes    Special Diet Not Asked    Stress Concern Not Asked    Weight Concern Not Asked     Service Not Asked    Blood Transfusions Not Asked    Occupational Exposure Not Asked    Hobby Hazards Not Asked    Sleep Concern Not Asked    Back Care Not Asked    Bike Helmet Not Asked    Self-Exams Not Asked   Social History Narrative    Not on file     Social Determinants of Health     Financial Resource Strain: Not on file   Food Insecurity: Not on file    Transportation Needs: Not on file   Physical Activity: Not on file   Stress: Not on file   Social Connections: Not on file   Housing Stability: Not on file       ECOG:  Grade 0 - Fully active, able to carry on all predisease activities without restrictions.      Education: YES  Knowledge Deficit Plan Of Care:    Problem:  Knowledge Deficit    Problems related to:    Radiation therapy    Interventions:  Instruct on purpose of radiation therapy    Expected Outcomes:  Knowledge of radiation therapy    Progress Toward Outcome:  Making progress    Pamphlets/Handouts Given to Patient:  Understanding radiation therapy      Are ADL's met?  Yes  Does patient feel safe in their environment?  Yes  Care decisions:  Patient and/or surrogate IS involved in care decisions.  Advanced directives:  Patient DOES NOT have advanced directives.  Transportation:  Adequate transportation available for expected visits    Pain:   ;Pain Score: 0   ;    ;

## 2024-09-27 NOTE — PATIENT INSTRUCTIONS
- PLEASE CALL [phone] WITH YOUR TREATMENT DECISION, OR IF YOU HAVE QUESTIONS REGARDING RADIATION THERAPY.      - IF YOU DECIDE TO HAVE RADIATION TREATMENTS, WE WILL CALL TO SCHEDULE YOU FOR YOUR CT SIMULATION FOR RADIATION PLANNING.

## 2024-10-04 ENCOUNTER — NURSE ONLY (OUTPATIENT)
Dept: HEMATOLOGY/ONCOLOGY | Age: 54
End: 2024-10-04
Attending: INTERNAL MEDICINE
Payer: COMMERCIAL

## 2024-10-04 ENCOUNTER — OFFICE VISIT (OUTPATIENT)
Dept: HEMATOLOGY/ONCOLOGY | Age: 54
End: 2024-10-04
Attending: INTERNAL MEDICINE
Payer: COMMERCIAL

## 2024-10-04 VITALS
HEART RATE: 90 BPM | HEIGHT: 65.16 IN | SYSTOLIC BLOOD PRESSURE: 121 MMHG | OXYGEN SATURATION: 97 % | BODY MASS INDEX: 32.43 KG/M2 | WEIGHT: 197 LBS | DIASTOLIC BLOOD PRESSURE: 77 MMHG | TEMPERATURE: 98 F | RESPIRATION RATE: 16 BRPM

## 2024-10-04 DIAGNOSIS — Z17.0 MALIGNANT NEOPLASM OF OVERLAPPING SITES OF LEFT BREAST IN FEMALE, ESTROGEN RECEPTOR POSITIVE (HCC): ICD-10-CM

## 2024-10-04 DIAGNOSIS — Z95.828 PORT-A-CATH IN PLACE: ICD-10-CM

## 2024-10-04 DIAGNOSIS — N91.2 AMENORRHEA: ICD-10-CM

## 2024-10-04 DIAGNOSIS — C50.912 INVASIVE DUCTAL CARCINOMA OF LEFT BREAST (HCC): ICD-10-CM

## 2024-10-04 DIAGNOSIS — C50.812 MALIGNANT NEOPLASM OF OVERLAPPING SITES OF LEFT BREAST IN FEMALE, ESTROGEN RECEPTOR POSITIVE (HCC): Primary | ICD-10-CM

## 2024-10-04 DIAGNOSIS — Z17.0 MALIGNANT NEOPLASM OF OVERLAPPING SITES OF LEFT BREAST IN FEMALE, ESTROGEN RECEPTOR POSITIVE (HCC): Primary | ICD-10-CM

## 2024-10-04 DIAGNOSIS — C50.812 MALIGNANT NEOPLASM OF OVERLAPPING SITES OF LEFT BREAST IN FEMALE, ESTROGEN RECEPTOR POSITIVE (HCC): ICD-10-CM

## 2024-10-04 LAB
ALBUMIN SERPL-MCNC: 3.2 G/DL (ref 3.4–5)
ALBUMIN/GLOB SERPL: 1 {RATIO} (ref 1–2)
ALP LIVER SERPL-CCNC: 88 U/L
ALT SERPL-CCNC: 27 U/L
ANION GAP SERPL CALC-SCNC: 5 MMOL/L (ref 0–18)
AST SERPL-CCNC: 14 U/L (ref 15–37)
BASOPHILS # BLD AUTO: 0.04 X10(3) UL (ref 0–0.2)
BASOPHILS NFR BLD AUTO: 0.6 %
BILIRUB SERPL-MCNC: 0.3 MG/DL (ref 0.1–2)
BUN BLD-MCNC: 15 MG/DL (ref 9–23)
CALCIUM BLD-MCNC: 9 MG/DL (ref 8.5–10.1)
CHLORIDE SERPL-SCNC: 108 MMOL/L (ref 98–112)
CO2 SERPL-SCNC: 28 MMOL/L (ref 21–32)
CREAT BLD-MCNC: 0.95 MG/DL
EGFRCR SERPLBLD CKD-EPI 2021: 72 ML/MIN/1.73M2 (ref 60–?)
EOSINOPHIL # BLD AUTO: 0.03 X10(3) UL (ref 0–0.7)
EOSINOPHIL NFR BLD AUTO: 0.4 %
ERYTHROCYTE [DISTWIDTH] IN BLOOD BY AUTOMATED COUNT: 15.2 %
GLOBULIN PLAS-MCNC: 3.3 G/DL (ref 2.8–4.4)
GLUCOSE BLD-MCNC: 116 MG/DL (ref 70–99)
HCT VFR BLD AUTO: 34.9 %
HGB BLD-MCNC: 12 G/DL
IMM GRANULOCYTES # BLD AUTO: 0.02 X10(3) UL (ref 0–1)
IMM GRANULOCYTES NFR BLD: 0.3 %
LDH SERPL L TO P-CCNC: 182 U/L
LYMPHOCYTES # BLD AUTO: 1.26 X10(3) UL (ref 1–4)
LYMPHOCYTES NFR BLD AUTO: 17.7 %
MCH RBC QN AUTO: 31.3 PG (ref 26–34)
MCHC RBC AUTO-ENTMCNC: 34.4 G/DL (ref 31–37)
MCV RBC AUTO: 90.9 FL
MONOCYTES # BLD AUTO: 0.94 X10(3) UL (ref 0.1–1)
MONOCYTES NFR BLD AUTO: 13.2 %
NEUTROPHILS # BLD AUTO: 4.83 X10 (3) UL (ref 1.5–7.7)
NEUTROPHILS # BLD AUTO: 4.83 X10(3) UL (ref 1.5–7.7)
NEUTROPHILS NFR BLD AUTO: 67.8 %
OSMOLALITY SERPL CALC.SUM OF ELEC: 294 MOSM/KG (ref 275–295)
PLATELET # BLD AUTO: 319 10(3)UL (ref 150–450)
POTASSIUM SERPL-SCNC: 3.9 MMOL/L (ref 3.5–5.1)
PROT SERPL-MCNC: 6.5 G/DL (ref 6.4–8.2)
RBC # BLD AUTO: 3.84 X10(6)UL
SODIUM SERPL-SCNC: 141 MMOL/L (ref 136–145)
WBC # BLD AUTO: 7.1 X10(3) UL (ref 4–11)

## 2024-10-04 PROCEDURE — 36591 DRAW BLOOD OFF VENOUS DEVICE: CPT

## 2024-10-04 PROCEDURE — 83615 LACTATE (LD) (LDH) ENZYME: CPT

## 2024-10-04 PROCEDURE — 99215 OFFICE O/P EST HI 40 MIN: CPT | Performed by: INTERNAL MEDICINE

## 2024-10-04 PROCEDURE — 85025 COMPLETE CBC W/AUTO DIFF WBC: CPT

## 2024-10-04 PROCEDURE — 80053 COMPREHEN METABOLIC PANEL: CPT

## 2024-10-04 NOTE — PROGRESS NOTES
Pt arrived amb for CLL. Port accessed and labs drawn per MD order. Port flushed and de-accessed per protocol. Pt left amb without c/o.

## 2024-10-04 NOTE — PROGRESS NOTES
Patient here for f/u. Patient completed chemo on 9/13/24. Patient had consultation with Dr Garcia on 9/27. Patient is considering doing RT. Patient would like her port removed prior to RT. Patient has no further concerns or complaints at this time    Education Record    Learner:  Patient    Disease / Diagnosis: malignant neoplasm     Barriers / Limitations:  None   Comments:    Method:  Discussion   Comments:    General Topics:  Medication, Side effects and symptom management, and Plan of care reviewed   Comments:    Outcome:  Shows understanding   Comments:

## 2024-10-04 NOTE — PROGRESS NOTES
Cancer Center Progress Note    Problem List:      Patient Active Problem List   Diagnosis    Candidal intertrigo    Class 1 obesity due to excess calories without serious comorbidity with body mass index (BMI) of 30.0 to 30.9 in adult    Hot flushes, perimenopausal    Intramural leiomyoma of uterus    Cystocele, midline    Status post hysteroscopic polypectomy    Preop testing    Abnormal uterine bleeding    Menorrhagia with regular cycle    Status post endometrial ablation    Special screening for malignant neoplasm of colon    Benign neoplasm of ascending colon    History of adenomatous polyp of colon    Benign neoplasm of descending colon    Invasive ductal carcinoma of left breast (HCC)    Malignant neoplasm of overlapping sites of left breast in female, estrogen receptor positive (HCC)       Interim History:    Anna Palacios presents today for evaluation and management of a diagnosis of left breast cancer.    She had four cycles of TC chemotherapy. She had transient nausea. She used Reglan PRN. She had no constipation. She had transient pain and muscle spasm. She has no abdominal pain. She has had fatigue. She has no new pain. She has no mouth pain.    She has had rad onc consultation. She is planning to proceed reluctantly with radiation.     The patient had a metastatic work up with bone scan and CT scan of the CAP that were negative for distant metastatic disease.       Review of Systems:   Constitutional: Negative for anorexia, chills, night sweats and weight loss.  Respiratory: Negative for cough, hemoptysis, chest pain, or dyspnea.  Cardiovascular: Negative for angina, orthopnea or palpitations.  Gastrointestinal: Negative for nausea, vomiting, change in bowel habits, diarrhea, constipation and abdominal pain.  Integument/breast: Negative for rash, skin lesions, and pruritus.  Psychiatric: The patient's mood was calm and appropriate for this visit.  The pertinent positives and negatives were described.  All other systems were negative.    PMH/PSH:  Past Medical History:    Anesthesia complication    gets shakey    Breast cancer (HCC)    DCIS (ductal carcinoma in situ) of breast    DUB (dysfunctional uterine bleeding)    Headache disorder    sometimes    High cholesterol    Sleep disturbance    some insomnia lately due to menopause    Visual impairment    reading glasses    Wears glasses    only for reading    Weight gain    through menopause       Past Surgical History:   Procedure Laterality Date    Colonoscopy  5-18-23    Colonoscopy      D & c  04/21/2022    Dilation/curettage,diagnostic      Endometrial ablation      Endometrial biopsy - jar(s): 2  04/21/2022    Hysteroscopy  04/21/2022    Lumpectomy left      [place] teeth removed         Family History Reviewed:  Family History   Problem Relation Age of Onset    Cancer Father 74        prostate with lung mets    Prostate Cancer Father     Hypertension Mother     Other (lung cancer) Paternal Uncle     Other (esophagus cancer) Paternal Cousin        Allergies:     Allergies   Allergen Reactions    Penicillins ITCHING     As a child       Medications:   Calcium Carbonate (CALCIUM 600 OR) Take 600 mg by mouth daily.      Multiple Vitamins-Minerals (MULTI-VITAMIN/MINERALS) Oral Tab Take 1 tablet by mouth daily.      Cholecalciferol (VITAMIN D) 50 MCG (2000 UT) Oral Cap Take 1 capsule (2,000 Units total) by mouth daily.      Nystatin 052235 UNIT/GM External Powder Apply 1 Application topically daily.      hydrocortisone 2.5 % External Cream Use on AA QD-BID prn 30 g 2    ketoconazole 2 % External Cream Apply to AA QD-BID prn. 60 g 2         Vital Signs:      Height: 165.5 cm (5' 5.16\") (10/04 1400)  Weight: 89.4 kg (197 lb) (10/04 1400)  BSA (Calculated - sq m): 1.97 sq meters (10/04 1400)  Pulse: 90 (10/04 1400)  BP: 121/77 (10/04 1400)  Temp: 97.7 °F (36.5 °C) (10/04 1400)  Do Not Use - Resp Rate: --  SpO2: 97 % (10/04 1400)      Performance Status:  ECOG 0: Fully  active, able to carry on all pre-disease performance without restriction     Physical Examination:    Constitutional: Patient is alert and oriented x 3, not in acute distress.  Respiratory: Clear to auscultation and percussion. No rales.  No wheezes.  Cardiovascular: Regular rate and rhythm. No murmurs.  Gastrointestinal: Soft, non tender with good bowel sounds.  Musculoskeletal: No edema. No calf tenderness.  Skin: No suspicious skin lesion, no rash, no ulceration.  Lymphatics: There is no palpable lymphadenopathy throughout in the cervical, supraclavicular, or axillary regions.  Psychiatric: The patient's mood is calm and appropriate for this visit.       Labs reviewed at this visit:     Lab Results   Component Value Date    WBC 7.1 10/04/2024    RBC 3.84 10/04/2024    HGB 12.0 10/04/2024    HCT 34.9 (L) 10/04/2024    MCV 90.9 10/04/2024    MCH 31.3 10/04/2024    MCHC 34.4 10/04/2024    RDW 15.2 10/04/2024    .0 10/04/2024     Lab Results   Component Value Date     10/04/2024    K 3.9 10/04/2024     10/04/2024    CO2 28.0 10/04/2024    BUN 15 10/04/2024    CREATSERUM 0.95 10/04/2024     (H) 10/04/2024    CA 9.0 10/04/2024    ALKPHO 88 10/04/2024    ALT 27 10/04/2024    AST 14 (L) 10/04/2024    BILT 0.3 10/04/2024    ALB 3.2 (L) 10/04/2024    TP 6.5 10/04/2024       Radiologic imaging reviewed at this visit:    Bone scan on 6/14/2024:  FINDINGS:    IMAGED AREA:  Whole-body   ABNORMALITIES:  Mildly increased radiotracer uptake involving the right greater than left ischial tuberosity likely due to enthesopathy at the hamstring insertion.  There is no evidence of lytic or blastic lesion to suggest metastatic disease.  There is   mild uptake noted on the right at L3-4 facet consistent with degenerative changes.  There is a few scattered areas of mildly increased uptake within the bone marrow of the midthoracic spine without any lesion seen on noncontrast CT is likely due to   physiologic  uptake rather than metastatic disease..  Sclerotic foci within the left sacrum and left ilium measuring under 1 cm sized without elevated radiotracer uptake most likely represent bone islands.   OTHER:  Probable area of scarring noted within the left lateral breast.  Focal scarring also noted within the left axilla.         CT CAP on 6/12/2024:  FINDINGS:       CHEST:    LUNGS:  No visible pulmonary disease.    MEDIASTINUM:  No mass or adenopathy.    SANTANA:  No mass or adenopathy.    CARDIAC:  No enlargement, pericardial thickening, or significant coronary artery calcification.  PLEURA:  No mass or effusion.    CHEST WALL:  There is a spiculated nodular density within the inferolateral quadrant of the left breast measuring 1.4 x 2.6 cm on axial imaging, best seen on image 75 of series 2. Please correlate clinically with patient's history of left breast cancer.   There is also evidence of left axillary lymphadenopathy, the largest lymph node measuring 1.3 x 2.5 cm in short axis and transverse dimensions respectively, best seen on image 39.  AORTA:  No aneurysm or dissection.    VASCULATURE:  No visible pulmonary arterial thrombus or attenuation.       ABDOMEN/PELVIS:  LIVER:  No enlargement, atrophy, abnormal density, or significant focal lesion.    BILIARY:  No visible dilatation or calcification.    PANCREAS:  No lesion, fluid collection, ductal dilatation, or atrophy.    SPLEEN:  No enlargement or focal lesion.    KIDNEYS:  No mass, obstruction, or calcification.    ADRENALS:  No mass or enlargement.    AORTA:  No aneurysm or dissection.    RETROPERITONEUM:  No mass or adenopathy.    BOWEL/MESENTERY:  No visible mass, obstruction, or bowel wall thickening.    ABDOMINAL WALL:  No mass or hernia.    URINARY BLADDER:  No visible focal wall thickening, lesion, or calculus.    PELVIC NODES:  No adenopathy.    PELVIC ORGANS:  There is suggestion of a uterine fibroid along the left side of the uterine body/lower uterine  segment measuring 2.1 x 2.3 cm.  No free fluid or inflammatory changes within the pelvis identified.    BONES:  Exaggeration of the thoracic kyphosis with moderate to extensive multilevel disc disease of the thoracic spine.  No lytic, blastic or destructive osseous changes are identified.     Impression   CONCLUSION:    1. There is a spiculated nodular density within the inferolateral quadrant of the left breast measuring 1.3 x 2.5 cm.  2. There is left axillary lymphadenopathy, with the largest lymph node measuring 1.4 cm in short axis dimension.  3. Lungs are clear.  4. No metastatic disease to the abdomen or pelvis suggested.  5. There is suggestion of a 2.3 cm uterine fibroid along the left side of the body/lower uterine segment.  Further assessment with pelvic ultrasound imaging for confirmation is recommended.          Assessment/Plan:     Left breast cancer in overlapping quadrants (3 o'clock):  Invasive ductal carcinoma, grade III  Lumpectomy on 4/12/2024  1.8 cm IDC  ER 15 %  MD 15%  Ki-67 >90%  Her2 1+  SLN 0/3 (1 node with ITC)  Oncotype Dx RS 69  Single gene studys on Oncotype with ER/MD/Her2 negative  TC x 4 completed in 9/2024    I agree with proceeding with radiation as recommended by Dr. Fuentes. I again discussed the benefit of adjuvant endocrine therapy. She has low level of ER/MD and oncotype was negative for ER/MD expression. We will hold off with a decision about endocrine therapy until after the radiation. She will also be a candidate for an IV bisphosphonate. I will have her send estradiol and FSH level since she had endometrial ablation and we do not know her menopausal status.      Uterine fibroid:    She has gyne follow up for work up.        Nilson Sinclair MD

## 2024-10-07 ENCOUNTER — OFFICE VISIT (OUTPATIENT)
Dept: FAMILY MEDICINE CLINIC | Facility: CLINIC | Age: 54
End: 2024-10-07
Payer: COMMERCIAL

## 2024-10-07 DIAGNOSIS — Z23 NEED FOR INFLUENZA VACCINATION: Primary | ICD-10-CM

## 2024-10-07 PROCEDURE — 90471 IMMUNIZATION ADMIN: CPT | Performed by: PHYSICIAN ASSISTANT

## 2024-10-07 PROCEDURE — 90656 IIV3 VACC NO PRSV 0.5 ML IM: CPT | Performed by: PHYSICIAN ASSISTANT

## 2024-10-07 NOTE — PAT NURSING NOTE
Per PAT encounter/MyChart message sent to pt:    PreOp Instructions     You are scheduled for: an Interventional Radiology Procedure     Date of Procedure: 10/16/24 Wednesday; Check in at 9 am.     Diet Instructions: Do not eat or drink anything after midnight including gum, mints, candy, etc.     Medications: May take your pain medication if needed on the morning of the procedure; Tylenol only; with sips of water.     Medications to Stop: Hold herbal supplements and vitamins morning of the procedure 10/16.     Skin Prep : Shower with antibacterial soap using a clean washcloth, prior to procedure. Once dried off, no lotions/powders/creams/ointments, etc.     Driving After Procedure: Sedation will be given so you WILL NOT be able to drive home. You will need a responsible adult  to drive you home. You can NOT take uber or taxi unless approved by your physician in advance.     Discharge Teaching: Your nurse will give you specific instructions before discharge, Most people can resume normal activities in 2-3 days, Any questions, please call the physician's office     [employer] parking is available starting at 6 am or park in the [place] parking garage at [facility]. Check in at the [facility] reception desk. Our  will be there to check you in for your procedure. Please bring your insurance cards and ID with you.                                                                                                                                      Please DO NOT respond to this message, the inbasket is not monitored for messages. For any questions, please call the physician's office.

## 2024-10-10 ENCOUNTER — HOSPITAL ENCOUNTER (OUTPATIENT)
Dept: RADIATION ONCOLOGY | Facility: HOSPITAL | Age: 54
Discharge: HOME | End: 2024-10-10
Attending: INTERNAL MEDICINE
Payer: COMMERCIAL

## 2024-10-10 PROCEDURE — 77333 RADIATION TREATMENT AID(S): CPT | Performed by: INTERNAL MEDICINE

## 2024-10-10 PROCEDURE — 77290 THER RAD SIMULAJ FIELD CPLX: CPT | Performed by: INTERNAL MEDICINE

## 2024-10-15 PROCEDURE — 77334 RADIATION TREATMENT AID(S): CPT | Performed by: INTERNAL MEDICINE

## 2024-10-15 PROCEDURE — 77295 3-D RADIOTHERAPY PLAN: CPT | Performed by: INTERNAL MEDICINE

## 2024-10-15 PROCEDURE — 77300 RADIATION THERAPY DOSE PLAN: CPT | Performed by: INTERNAL MEDICINE

## 2024-10-16 ENCOUNTER — HOSPITAL ENCOUNTER (OUTPATIENT)
Dept: INTERVENTIONAL RADIOLOGY/VASCULAR | Facility: HOSPITAL | Age: 54
Discharge: HOME | End: 2024-10-16
Attending: INTERNAL MEDICINE | Admitting: INTERNAL MEDICINE
Payer: COMMERCIAL

## 2024-10-16 VITALS
HEART RATE: 76 BPM | TEMPERATURE: 98 F | WEIGHT: 197 LBS | SYSTOLIC BLOOD PRESSURE: 104 MMHG | BODY MASS INDEX: 32.82 KG/M2 | OXYGEN SATURATION: 97 % | DIASTOLIC BLOOD PRESSURE: 60 MMHG | RESPIRATION RATE: 17 BRPM | HEIGHT: 65 IN

## 2024-10-16 DIAGNOSIS — C50.812 MALIGNANT NEOPLASM OF OVERLAPPING SITES OF LEFT BREAST IN FEMALE, ESTROGEN RECEPTOR POSITIVE (HCC): ICD-10-CM

## 2024-10-16 DIAGNOSIS — Z95.828 PORT-A-CATH IN PLACE: ICD-10-CM

## 2024-10-16 DIAGNOSIS — Z17.0 MALIGNANT NEOPLASM OF OVERLAPPING SITES OF LEFT BREAST IN FEMALE, ESTROGEN RECEPTOR POSITIVE (HCC): ICD-10-CM

## 2024-10-16 LAB — INR: 1 (ref 0.8–1.3)

## 2024-10-16 PROCEDURE — 85610 PROTHROMBIN TIME: CPT | Performed by: RADIOLOGY

## 2024-10-16 PROCEDURE — 77001 FLUOROGUIDE FOR VEIN DEVICE: CPT | Performed by: RADIOLOGY

## 2024-10-16 PROCEDURE — 36590 REMOVAL TUNNELED CV CATH: CPT | Performed by: RADIOLOGY

## 2024-10-16 PROCEDURE — 99152 MOD SED SAME PHYS/QHP 5/>YRS: CPT | Performed by: RADIOLOGY

## 2024-10-16 PROCEDURE — 0JPT0WZ REMOVAL OF TOTALLY IMPLANTABLE VASCULAR ACCESS DEVICE FROM TRUNK SUBCUTANEOUS TISSUE AND FASCIA, OPEN APPROACH: ICD-10-PCS | Performed by: RADIOLOGY

## 2024-10-16 RX ORDER — LIDOCAINE HYDROCHLORIDE AND EPINEPHRINE BITARTRATE 20; .01 MG/ML; MG/ML
INJECTION, SOLUTION SUBCUTANEOUS
Status: COMPLETED
Start: 2024-10-16 | End: 2024-10-16

## 2024-10-16 RX ORDER — MIDAZOLAM HYDROCHLORIDE 1 MG/ML
INJECTION INTRAMUSCULAR; INTRAVENOUS
Status: COMPLETED
Start: 2024-10-16 | End: 2024-10-16

## 2024-10-16 RX ORDER — ONDANSETRON 2 MG/ML
INJECTION INTRAMUSCULAR; INTRAVENOUS
Status: COMPLETED
Start: 2024-10-16 | End: 2024-10-16

## 2024-10-16 RX ORDER — SODIUM CHLORIDE 9 MG/ML
INJECTION, SOLUTION INTRAVENOUS CONTINUOUS
Status: DISCONTINUED | OUTPATIENT
Start: 2024-10-16 | End: 2024-10-16

## 2024-10-16 NOTE — PROGRESS NOTES
Pt post PAC removal. Pt sleepy but arousable. Vss. Right chest incision site is CDI.    Recovery complete per protocol. Vss. Right chest site remains CDI. Discharge instructions reviewed, iv dc'd and pt discharged home with  driving.

## 2024-10-16 NOTE — PROCEDURES
[facility]   part of [facility]  Procedure Note    Anna Palacios Patient Status:  Outpatient    10/9/1970 MRN [id number]   Location [facility] INTERVENTIONAL SUITES Attending Nilson Sinclair MD    Day # 0 PCP Katty Blanton MD     Procedure: Port removal    Pre-Procedure Diagnosis:  Therapy complete    Post-Procedure Diagnosis: Same    Anesthesia:  Sedation    Findings:  No complication    Specimens: NOne    Blood Loss:  < 5 cc    Tourniquet Time: None  Complications:  None  Drains:  None    Secondary Diagnosis:  n/A    Vimal Butts MD  10/16/2024

## 2024-10-16 NOTE — DISCHARGE INSTRUCTIONS
Do not drive today.     Avoid drinking alcohol today.    Rest today and tomorrow.    Keep the dressing clean and dry.    You may remove the dressing after 3 days. You may shower after the dressing is removed.    Monitor for any signs of infection including a fever, chills, redness, swelling, thick yellow drainage and/or a foul smell coming from the removal site. If any of these occur, notify your oncologist right away.    Do not make any personal/business decisions and/or sign any legal documents for the next 24 hours.

## 2024-10-16 NOTE — H&P
[facility]   part of [facility]   History & Physical    Anna Palacios Patient Status:  Outpatient    10/9/1970 MRN [id number]   Location [facility] INTERVENTIONAL SUITES Attending Nilson Sinclair MD   Hosp Day # 0 PCP Katty Blanton MD     Admitting Diagnosis:   54 year-old female with therapy complete    History of Present Illness:   54 year-old female with therapy complete    History   Past Medical History:  Past Medical History:    Anesthesia complication    gets shakey    Breast cancer (HCC)    DCIS (ductal carcinoma in situ) of breast    DUB (dysfunctional uterine bleeding)    Headache disorder    sometimes    High cholesterol    Sleep disturbance    some insomnia lately due to menopause    Visual impairment    reading glasses    Wears glasses    only for reading    Weight gain    through menopause       Past Surgical History:  Past Surgical History:   Procedure Laterality Date    Colonoscopy  24    Colonoscopy      D & c  2022    Dilation/curettage,diagnostic      Endometrial ablation      Endometrial biopsy - jar(s): 2  2022    Hysteroscopy  2022    Lumpectomy left      [place] teeth removed         Social History:  Social History     Tobacco Use    Smoking status: Never     Passive exposure: Never    Smokeless tobacco: Never   Substance Use Topics    Alcohol use: Never        Family History:  Family History   Problem Relation Age of Onset    Cancer Father 74        prostate with lung mets    Prostate Cancer Father     Hypertension Mother     Other (lung cancer) Paternal Uncle     Other (esophagus cancer) Paternal Cousin        Allergies/Medications:   Allergies:  Allergies[1]    Medications:  No current outpatient medications on file.    Physical Exam & Review of Systems:   Physical Exam:    /79 (BP Location: Right arm)   Pulse 93   Temp 97.7 °F (36.5 °C) (Temporal)   Resp 15   Ht 65\"   Wt 197 lb   LMP 10/01/2022 (Approximate)   SpO2 97%   BMI 32.78 kg/m²      General: NAD  Neck: No JVD  Cardiac: Normal  Rate  Lungs: Non-labored respirations  Abdomen: Nondistended  Neuro: Alert and oriented    ASA: III  Mallampati: II    Results:   Labs:  No results for input(s): \"RBC\", \"HGB\", \"HCT\", \"MCV\", \"MCH\", \"MCHC\", \"RDW\", \"NEPRELIM\", \"WBC\", \"PLT\" in the last 168 hours.  Recent Labs   Lab 10/16/24  0938   INR 1.0     No results for input(s): \"GLU\", \"BUN\", \"CREATSERUM\", \"GFRAA\", \"GFRNAA\", \"CA\", \"NA\", \"K\", \"CL\", \"CO2\" in the last 168 hours.    Assessment/Plan:   Impression: 54 year-old female with therapy complete    I have discussed with the patient and/or legal representative the potential benefits, risks, and side effects of this procedure, the likelihood of the patient achieving goals; and the potential problems that might occur during recuperation.  I discussed reasonable alternatives to the procedure, including risks, benefits and side effects related to the alternatives, and risks related to not receiving this procedure.    Recommendations: Port removal    Vimal Butts MD  10/16/2024  9:46 AM           [1]   Allergies  Allergen Reactions    Penicillins ITCHING     As a child

## 2024-10-17 NOTE — PROCEDURES
[facility]   part of [facility]  Procedure Note    Anna Palacios Patient Status:  Outpatient    10/9/1970 MRN [id number]   Location [facility] INTERVENTIONAL SUITES Attending No att. providers found   Hosp Day # 0 PCP Katty Blanton MD     Procedure: Port removal    Pre-Procedure Diagnosis:  Left breast cancer    Post-Procedure Diagnosis: Same    Anesthesia:  Sedation    Findings:  None    Specimens: None    Blood Loss:  < 5 cc    Tourniquet Time: None  Complications:  None  Drains:  None    Secondary Diagnosis:  N/A    Vimal Butts MD  10/17/2024

## 2024-10-24 ENCOUNTER — HOSPITAL ENCOUNTER (OUTPATIENT)
Dept: RADIATION ONCOLOGY | Facility: HOSPITAL | Age: 54
Discharge: HOME | End: 2024-10-24
Attending: INTERNAL MEDICINE
Payer: COMMERCIAL

## 2024-10-24 PROCEDURE — 77412 RADIATION TX DELIVERY LVL 3: CPT | Performed by: INTERNAL MEDICINE

## 2024-10-24 PROCEDURE — 77280 THER RAD SIMULAJ FIELD SMPL: CPT | Performed by: INTERNAL MEDICINE

## 2024-10-24 NOTE — PROGRESS NOTES
[facility] Radiation Treatment Management Note 1-5    Patient:  Anna Palacios  Age:  54 year old  Visit Diagnosis:    1. Malignant neoplasm of left breast in female, estrogen receptor positive, unspecified site of breast (HCC)      Primary Rad/Onc:  Dr. Aure Fuentes    Site Delivered Dose (cGy) Prescribed Dose (cGy) Fraction #   L BREAST 534 4005 2/15   L BREAST BOOST 0 1000 0/5     First treatment date:  10/24/24  Concurrent chemotherapy: N/A        10/16/2024    11:00 AM 10/16/2024    11:15 AM 10/25/2024    11:31 AM   Oncology Vitals   /66 104/60 125/84   Pulse 73 76 86   Resp 18 17 18   Temp   98.9 °F (37.2 °C)   SpO2 96 % 97 % 98 %   Pain Score   0        Toxicities:  Fatigue Grade 0= None  Pruritis Grade 0= None  Dry Skin absent  Dermatitis associated with radiation Grade 0= None  Moisturizer used  cetaphil  applied Number of times: 2 times daily.  Hyperpigmentation absent      Nursing Note:  Pt tolerating RT well  Reviewed potential SE of RT and management  Reviewed skin care     Britney POWELL RN    Physician Note:  Subjective:  DOing well, no issues or c/o.  Using moisturizier, will start mometasone.      Objective:  Unchanged      Treatment setup imaging have been reviewed:  Yes    Assessment/Plan:    Rx for mometasone given    Continue radiotherapy per plan    Next visit:  1 week    Dr. Julio Cesar Holt

## 2024-10-25 ENCOUNTER — HOSPITAL ENCOUNTER (OUTPATIENT)
Dept: RADIATION ONCOLOGY | Facility: HOSPITAL | Age: 54
Discharge: HOME | End: 2024-10-25
Attending: RADIOLOGY
Payer: COMMERCIAL

## 2024-10-25 VITALS
DIASTOLIC BLOOD PRESSURE: 84 MMHG | SYSTOLIC BLOOD PRESSURE: 125 MMHG | TEMPERATURE: 99 F | OXYGEN SATURATION: 98 % | HEART RATE: 86 BPM | RESPIRATION RATE: 18 BRPM

## 2024-10-25 DIAGNOSIS — Z17.0 MALIGNANT NEOPLASM OF LEFT BREAST IN FEMALE, ESTROGEN RECEPTOR POSITIVE, UNSPECIFIED SITE OF BREAST (HCC): Primary | ICD-10-CM

## 2024-10-25 DIAGNOSIS — C50.912 MALIGNANT NEOPLASM OF LEFT BREAST IN FEMALE, ESTROGEN RECEPTOR POSITIVE, UNSPECIFIED SITE OF BREAST (HCC): Primary | ICD-10-CM

## 2024-10-25 PROCEDURE — 77412 RADIATION TX DELIVERY LVL 3: CPT | Performed by: INTERNAL MEDICINE

## 2024-10-25 PROCEDURE — 77290 THER RAD SIMULAJ FIELD CPLX: CPT | Performed by: INTERNAL MEDICINE

## 2024-10-25 RX ORDER — MOMETASONE FUROATE 1 MG/G
1 OINTMENT TOPICAL 2 TIMES DAILY
Qty: 45 G | Refills: 2 | Status: SHIPPED | OUTPATIENT
Start: 2024-10-25

## 2024-10-28 PROCEDURE — 77412 RADIATION TX DELIVERY LVL 3: CPT | Performed by: INTERNAL MEDICINE

## 2024-10-28 PROCEDURE — 77387 GUIDANCE FOR RADJ TX DLVR: CPT | Performed by: INTERNAL MEDICINE

## 2024-10-29 PROCEDURE — 77412 RADIATION TX DELIVERY LVL 3: CPT | Performed by: INTERNAL MEDICINE

## 2024-10-29 PROCEDURE — 77387 GUIDANCE FOR RADJ TX DLVR: CPT | Performed by: INTERNAL MEDICINE

## 2024-10-30 PROCEDURE — 77412 RADIATION TX DELIVERY LVL 3: CPT | Performed by: INTERNAL MEDICINE

## 2024-10-30 PROCEDURE — 77387 GUIDANCE FOR RADJ TX DLVR: CPT | Performed by: INTERNAL MEDICINE

## 2024-10-31 PROCEDURE — 77387 GUIDANCE FOR RADJ TX DLVR: CPT | Performed by: INTERNAL MEDICINE

## 2024-10-31 PROCEDURE — 77412 RADIATION TX DELIVERY LVL 3: CPT | Performed by: INTERNAL MEDICINE

## 2024-11-01 ENCOUNTER — HOSPITAL ENCOUNTER (OUTPATIENT)
Dept: RADIATION ONCOLOGY | Facility: HOSPITAL | Age: 54
Discharge: HOME | End: 2024-11-01
Attending: INTERNAL MEDICINE
Payer: COMMERCIAL

## 2024-11-01 VITALS
DIASTOLIC BLOOD PRESSURE: 82 MMHG | TEMPERATURE: 99 F | RESPIRATION RATE: 18 BRPM | OXYGEN SATURATION: 99 % | SYSTOLIC BLOOD PRESSURE: 128 MMHG | HEART RATE: 77 BPM

## 2024-11-01 DIAGNOSIS — Z17.0 MALIGNANT NEOPLASM OF LEFT BREAST IN FEMALE, ESTROGEN RECEPTOR POSITIVE, UNSPECIFIED SITE OF BREAST (HCC): Primary | ICD-10-CM

## 2024-11-01 DIAGNOSIS — C50.912 MALIGNANT NEOPLASM OF LEFT BREAST IN FEMALE, ESTROGEN RECEPTOR POSITIVE, UNSPECIFIED SITE OF BREAST (HCC): Primary | ICD-10-CM

## 2024-11-01 PROCEDURE — 77387 GUIDANCE FOR RADJ TX DLVR: CPT | Performed by: INTERNAL MEDICINE

## 2024-11-01 PROCEDURE — 77412 RADIATION TX DELIVERY LVL 3: CPT | Performed by: INTERNAL MEDICINE

## 2024-11-01 PROCEDURE — 77336 RADIATION PHYSICS CONSULT: CPT | Performed by: INTERNAL MEDICINE

## 2024-11-01 NOTE — PROGRESS NOTES
[facility] Radiation Treatment Management Note 6-10    Patient:  Anna Palacios  Age:  54 year old  Visit Diagnosis:  L Breast CA  Primary Rad/Onc:  Dr. Aure Fuentes    Site Delivered Dose (cGy) Prescribed Dose (cGy) Fraction #   L BREAST 1869 4005 7/15   L BREAST BOOST 0 1000 0/5     First treatment date:  10/24/24  Concurrent chemotherapy: N/A        10/16/2024    11:15 AM 10/25/2024    11:31 AM 11/1/2024    12:28 PM   Oncology Vitals   /60 125/84 128/82   Pulse 76 86 77   Resp 17 18 18   Temp  98.9 °F (37.2 °C) 98.9 °F (37.2 °C)   SpO2 97 % 98 % 99 %   Pain Score  0 0        Toxicities:  Fatigue Grade 1= Fatigue relieved by rest  Pruritis Grade 0= None  Dry Skin absent  Dermatitis associated with radiation Grade 1= Faint erythema or dry desquamation  Moisturizer used Mometasone and cetaphil  applied Number of times: 2 times daily.  Hyperpigmentation absent    Nursing Note:  C/o some tenderness under L breast/axilla area from positioning on the table  Using Mometasone and cetaphil BID    Britney POWELL RN    Physician Note:  Subjective:  -doing well, mild tenderness      Objective:  Vitals noted  ECOG 0  NAD  Minimal skin changes      Treatment setup imaging have been reviewed:  Yes    Assessment/Plan:  -cont RT, skin care bid    We discussed expected future toxicity. All questions answered.  Next OTV 1 week    Aure Fuentes MD

## 2024-11-04 PROCEDURE — 77412 RADIATION TX DELIVERY LVL 3: CPT | Performed by: INTERNAL MEDICINE

## 2024-11-04 PROCEDURE — 77387 GUIDANCE FOR RADJ TX DLVR: CPT | Performed by: INTERNAL MEDICINE

## 2024-11-05 PROCEDURE — 77387 GUIDANCE FOR RADJ TX DLVR: CPT | Performed by: INTERNAL MEDICINE

## 2024-11-05 PROCEDURE — 77412 RADIATION TX DELIVERY LVL 3: CPT | Performed by: INTERNAL MEDICINE

## 2024-11-06 ENCOUNTER — HOSPITAL ENCOUNTER (OUTPATIENT)
Dept: RADIATION ONCOLOGY | Facility: HOSPITAL | Age: 54
Discharge: HOME | End: 2024-11-06
Attending: INTERNAL MEDICINE
Payer: COMMERCIAL

## 2024-11-06 PROCEDURE — 77333 RADIATION TREATMENT AID(S): CPT | Performed by: INTERNAL MEDICINE

## 2024-11-06 PROCEDURE — 77412 RADIATION TX DELIVERY LVL 3: CPT | Performed by: INTERNAL MEDICINE

## 2024-11-06 PROCEDURE — 77290 THER RAD SIMULAJ FIELD CPLX: CPT | Performed by: INTERNAL MEDICINE

## 2024-11-07 PROCEDURE — 77412 RADIATION TX DELIVERY LVL 3: CPT | Performed by: INTERNAL MEDICINE

## 2024-11-07 PROCEDURE — 77387 GUIDANCE FOR RADJ TX DLVR: CPT | Performed by: INTERNAL MEDICINE

## 2024-11-07 NOTE — PROGRESS NOTES
[facility] Radiation Treatment Management Note 11-15    Patient:  Anna Palacios  Age:  54 year old  Visit Diagnosis:    1. Malignant neoplasm of left breast in female, estrogen receptor positive, unspecified site of breast (HCC)      Primary Rad/Onc:  Dr. Aure Fuentes    Site Delivered Dose (cGy) Prescribed Dose (cGy) Fraction #   L BREAST 3204 4005 12/15   L BREAST BOOST 0 1000 0/5     First treatment date:  10/24/24  Concurrent chemotherapy: N/A        10/25/2024    11:31 AM 11/1/2024    12:28 PM 11/8/2024    11:29 AM   Oncology Vitals   /84 128/82 130/86   Pulse 86 77 83   Resp 18 18 18   Temp 98.9 °F (37.2 °C) 98.9 °F (37.2 °C) 98.7 °F (37.1 °C)   SpO2 98 % 99 % 97 %   Pain Score 0 0 0        Toxicities:  Fatigue Grade 1= Fatigue relieved by rest  Pruritis Grade 0= None  Dry Skin absent  Dermatitis associated with radiation Grade 1= Faint erythema or dry desquamation  Moisturizer used Mometasone and cetaphil  applied Number of times: 2 times daily.  Hyperpigmentation absent    Nursing Note:  Skin intact with mild erythema  Pink in IMF  No open areas noted  Using tylenol FRAN POWELL RN    Physician Note:  Subjective:  -doing ok, very mild discomfort, takes occ Tylenol       Objective:  Vitals noted  ECOG 0  NAD  Very faint erythema      Treatment setup imaging have been reviewed:  Yes    Assessment/Plan:  -cont RT and skin care    We discussed expected future toxicity. All questions answered.  Next OTV 1 week    Aure Fuentes MD

## 2024-11-08 ENCOUNTER — HOSPITAL ENCOUNTER (OUTPATIENT)
Dept: RADIATION ONCOLOGY | Facility: HOSPITAL | Age: 54
Discharge: HOME | End: 2024-11-08
Attending: INTERNAL MEDICINE
Payer: COMMERCIAL

## 2024-11-08 VITALS
DIASTOLIC BLOOD PRESSURE: 86 MMHG | TEMPERATURE: 99 F | SYSTOLIC BLOOD PRESSURE: 130 MMHG | OXYGEN SATURATION: 97 % | RESPIRATION RATE: 18 BRPM | HEART RATE: 83 BPM

## 2024-11-08 DIAGNOSIS — Z17.0 MALIGNANT NEOPLASM OF LEFT BREAST IN FEMALE, ESTROGEN RECEPTOR POSITIVE, UNSPECIFIED SITE OF BREAST (HCC): Primary | ICD-10-CM

## 2024-11-08 DIAGNOSIS — C50.912 MALIGNANT NEOPLASM OF LEFT BREAST IN FEMALE, ESTROGEN RECEPTOR POSITIVE, UNSPECIFIED SITE OF BREAST (HCC): Primary | ICD-10-CM

## 2024-11-08 PROCEDURE — 77336 RADIATION PHYSICS CONSULT: CPT | Performed by: INTERNAL MEDICINE

## 2024-11-08 PROCEDURE — 77412 RADIATION TX DELIVERY LVL 3: CPT | Performed by: INTERNAL MEDICINE

## 2024-11-08 PROCEDURE — 77290 THER RAD SIMULAJ FIELD CPLX: CPT | Performed by: INTERNAL MEDICINE

## 2024-11-11 PROCEDURE — 77293 RESPIRATOR MOTION MGMT SIMUL: CPT | Performed by: INTERNAL MEDICINE

## 2024-11-11 PROCEDURE — 77334 RADIATION TREATMENT AID(S): CPT | Performed by: INTERNAL MEDICINE

## 2024-11-11 PROCEDURE — 77412 RADIATION TX DELIVERY LVL 3: CPT | Performed by: INTERNAL MEDICINE

## 2024-11-11 PROCEDURE — 77295 3-D RADIOTHERAPY PLAN: CPT | Performed by: INTERNAL MEDICINE

## 2024-11-11 PROCEDURE — 77300 RADIATION THERAPY DOSE PLAN: CPT | Performed by: INTERNAL MEDICINE

## 2024-11-12 PROCEDURE — 77387 GUIDANCE FOR RADJ TX DLVR: CPT | Performed by: INTERNAL MEDICINE

## 2024-11-12 PROCEDURE — 77412 RADIATION TX DELIVERY LVL 3: CPT | Performed by: INTERNAL MEDICINE

## 2024-11-13 PROCEDURE — 77412 RADIATION TX DELIVERY LVL 3: CPT | Performed by: INTERNAL MEDICINE

## 2024-11-13 PROCEDURE — 77387 GUIDANCE FOR RADJ TX DLVR: CPT | Performed by: INTERNAL MEDICINE

## 2024-11-14 ENCOUNTER — HOSPITAL ENCOUNTER (OUTPATIENT)
Dept: RADIATION ONCOLOGY | Facility: HOSPITAL | Age: 54
Discharge: HOME | End: 2024-11-14
Attending: INTERNAL MEDICINE
Payer: COMMERCIAL

## 2024-11-14 PROCEDURE — 77280 THER RAD SIMULAJ FIELD SMPL: CPT | Performed by: INTERNAL MEDICINE

## 2024-11-14 PROCEDURE — 77336 RADIATION PHYSICS CONSULT: CPT | Performed by: INTERNAL MEDICINE

## 2024-11-14 PROCEDURE — 77412 RADIATION TX DELIVERY LVL 3: CPT | Performed by: INTERNAL MEDICINE

## 2024-11-15 ENCOUNTER — APPOINTMENT (OUTPATIENT)
Dept: RADIATION ONCOLOGY | Facility: HOSPITAL | Age: 54
End: 2024-11-15
Attending: RADIOLOGY
Payer: COMMERCIAL

## 2024-11-15 ENCOUNTER — TELEPHONE (OUTPATIENT)
Dept: SURGERY | Facility: CLINIC | Age: 54
End: 2024-11-15

## 2024-11-15 PROCEDURE — 77412 RADIATION TX DELIVERY LVL 3: CPT | Performed by: INTERNAL MEDICINE

## 2024-11-15 PROCEDURE — 77387 GUIDANCE FOR RADJ TX DLVR: CPT | Performed by: INTERNAL MEDICINE

## 2024-11-15 NOTE — TELEPHONE ENCOUNTER
Patient called regarding mammogram.Patient let us know that she is finishing radiation next week. Let patient know that she needs to do mammogram in end of March/ April. After imaging is complete she will followup in office for a clinical exam with . Patient verbally understood and appreciated call.

## 2024-11-18 PROCEDURE — 77412 RADIATION TX DELIVERY LVL 3: CPT | Performed by: INTERNAL MEDICINE

## 2024-11-18 PROCEDURE — 77387 GUIDANCE FOR RADJ TX DLVR: CPT | Performed by: INTERNAL MEDICINE

## 2024-11-19 ENCOUNTER — HOSPITAL ENCOUNTER (OUTPATIENT)
Dept: RADIATION ONCOLOGY | Facility: HOSPITAL | Age: 54
Discharge: HOME | End: 2024-11-19
Attending: INTERNAL MEDICINE
Payer: COMMERCIAL

## 2024-11-19 VITALS
OXYGEN SATURATION: 97 % | HEART RATE: 75 BPM | RESPIRATION RATE: 16 BRPM | SYSTOLIC BLOOD PRESSURE: 124 MMHG | DIASTOLIC BLOOD PRESSURE: 77 MMHG | TEMPERATURE: 99 F

## 2024-11-19 DIAGNOSIS — Z17.0 MALIGNANT NEOPLASM OF LEFT BREAST IN FEMALE, ESTROGEN RECEPTOR POSITIVE, UNSPECIFIED SITE OF BREAST (HCC): Primary | ICD-10-CM

## 2024-11-19 DIAGNOSIS — C50.912 MALIGNANT NEOPLASM OF LEFT BREAST IN FEMALE, ESTROGEN RECEPTOR POSITIVE, UNSPECIFIED SITE OF BREAST (HCC): Primary | ICD-10-CM

## 2024-11-19 PROCEDURE — 77387 GUIDANCE FOR RADJ TX DLVR: CPT | Performed by: INTERNAL MEDICINE

## 2024-11-19 PROCEDURE — 77412 RADIATION TX DELIVERY LVL 3: CPT | Performed by: INTERNAL MEDICINE

## 2024-11-19 NOTE — PATIENT INSTRUCTIONS
- WE WILL CALL TO SCHEDULE YOU FOR A FOLLOW-UP WITH DR. RIOS IN 3 MONTHS  - SIDE EFFECTS OF RADIATION WILL GRADUALLY SUBSIDE. IT MAY TAKE 1- 2 WEEKS POST-RADIATION FOR YOU TO NOTICE CHANGES SUCH AS A DECREASE IN YOUR FATIGUE LEVEL, DECREASE IN REDNESS AND/OR DRYNESS OF TREATED SKIN SITE, DECREASE IN DISCOMFORT TO TREATED AREA.   - DISCONTINUE MOMETASONE CREAM AFTER RADIATION COMPLETION. CONTINUE TO APPLY MILD CREAM TO TREATED AREA 2X/DAY, DO NOT USE HOT/COLD PACKS DIRECTLY ON SITE, USE MILD SOAP/DEODORANT TO AFFECTED AREA, KEEP OPEN TO AIR WHEN AT HOME.   - CALL THE NURSE LINE AT [phone] IF YOU HAVE ANY QUESTIONS/CONCERNS REGARDING RADIATION THERAPY.

## 2024-11-19 NOTE — PROGRESS NOTES
[facility] Radiation Treatment Management Note 16-20    Patient:  Anna Palacios  Age:  54 year old  Visit Diagnosis:  No diagnosis found.  Primary Rad/Onc:  Dr. Aure Fuentes    Site Delivered Dose (cGy) Prescribed Dose (cGy) Fraction #   LEFT BREAST  4005 4005 15/15   L BREAST BOOST 800 1000 4/5     First treatment date:   10/24/24  Concurrent chemotherapy:  N/A        10/25/2024    11:31 AM 11/1/2024    12:28 PM 11/8/2024    11:29 AM   Oncology Vitals   /84 128/82 130/86   Pulse 86 77 83   Resp 18 18 18   Temp 98.9 °F (37.2 °C) 98.9 °F (37.2 °C) 98.7 °F (37.1 °C)   SpO2 98 % 99 % 97 %   Pain Score 0 0 0        Toxicities:  Fatigue Grade 1= Fatigue relieved by rest  Pruritis Grade 0= None  Dry Skin absent  Dermatitis associated with radiation Grade 2= Moderate to brisk erythema, patchy moist desquamation mostly confined to skin folds and creases; moderate edema  Moisturizer used Mometasone and Vaseline applied Number of times: 2 times daily.  Hyperpigmentation absent    Nursing Note:  Pt to finish RT tomorrow, AVS to give.  Has open wet desquamation to inframammary fold.  States are occ bleeds, has intermittent discomfort to site.  Recommended Silvadene to open area BID; instructed pt on use.  Has full ROM to LUE.    Erica POWELL RN    Physician Note:  Subjective:  Linear area of peeling in IM fold  Using vaseline to IM fold and cetaphil to rest of breast  IM fold is tender, at times there has been a bit of bleeding      Objective:  Vitals noted  ECOG 0  NAD  Breast with grade 1 dermatitis  About 2-3cm linear area of moist desquamation in IM fold, mid erythema, no active drainage      Treatment setup imaging have been reviewed:  Yes    Assessment/Plan:  -done with RT tomorrow  -stop mometasone after last day  -cetaphil to total breast  -ketoconaozle cream to IM fold, if this is irritating, switch to vaseline    We discussed expected future toxicity. All questions answered.  Skin check in 1  héctor Fuentes MD

## 2024-11-20 ENCOUNTER — DOCUMENTATION ONLY (OUTPATIENT)
Dept: RADIATION ONCOLOGY | Facility: HOSPITAL | Age: 54
End: 2024-11-20

## 2024-11-20 PROCEDURE — 77336 RADIATION PHYSICS CONSULT: CPT | Performed by: INTERNAL MEDICINE

## 2024-11-20 PROCEDURE — 77387 GUIDANCE FOR RADJ TX DLVR: CPT | Performed by: INTERNAL MEDICINE

## 2024-11-20 PROCEDURE — 77412 RADIATION TX DELIVERY LVL 3: CPT | Performed by: INTERNAL MEDICINE

## 2024-11-26 ENCOUNTER — HOSPITAL ENCOUNTER (OUTPATIENT)
Dept: RADIATION ONCOLOGY | Facility: HOSPITAL | Age: 54
Discharge: HOME | End: 2024-11-26
Attending: INTERNAL MEDICINE
Payer: COMMERCIAL

## 2024-11-26 NOTE — PROGRESS NOTES
Pt here for nursing skin check today.  L breast with very mild erythema  IM fold no longer open  Still some linear redness to IM fold  Using ketoconazole to IM fold  Continues to have some tenderness to breast    Advised can continue ketoconazole for a few more days since it was helping  Advised to continue moisturizing BID for 2-3 weeks  Can use tylenol or ibuprofen for tenderness     Advised pt to call with any questions or concerns  Pt has direct nursing phone number.

## 2024-12-04 NOTE — PROGRESS NOTES
Radiation Oncology Treatment Summary    Diagnosis: L breast IDC, grade 3, ER/TX+, HER2-, Ki-67 >90%, pT1c (1.8 cm) N0i+ (1/3 with ITCs) M0         IGRT: kv and cbct match    Clinical Course: The treatment course was completed as prescribed. She tolerated RT well with grade 1-2 dermatitis. She had a small linear area of desquamation in the IM fold.     Follow-up: Return to clinic in 1 week for skin check and 3m for routine follow-up or sooner if needed. Continue follow-up with other physicians as planned.     For more information, or to request a detailed radiation treatment summary, please call [facility] Radiation Oncology at our [place] location, [phone].

## 2024-12-10 ENCOUNTER — OFFICE VISIT (OUTPATIENT)
Dept: OBGYN CLINIC | Facility: CLINIC | Age: 54
End: 2024-12-10
Payer: COMMERCIAL

## 2024-12-10 VITALS
HEART RATE: 98 BPM | DIASTOLIC BLOOD PRESSURE: 70 MMHG | WEIGHT: 193 LBS | SYSTOLIC BLOOD PRESSURE: 110 MMHG | HEIGHT: 65.75 IN | BODY MASS INDEX: 31.39 KG/M2

## 2024-12-10 DIAGNOSIS — Z01.419 WELL WOMAN EXAM WITH ROUTINE GYNECOLOGICAL EXAM: Primary | ICD-10-CM

## 2024-12-10 DIAGNOSIS — N76.0 VAGINITIS AND VULVOVAGINITIS: ICD-10-CM

## 2024-12-10 DIAGNOSIS — Z12.4 SCREENING FOR CERVICAL CANCER: ICD-10-CM

## 2024-12-10 PROCEDURE — 81514 NFCT DS BV&VAGINITIS DNA ALG: CPT | Performed by: OBSTETRICS & GYNECOLOGY

## 2024-12-10 PROCEDURE — 87624 HPV HI-RISK TYP POOLED RSLT: CPT | Performed by: OBSTETRICS & GYNECOLOGY

## 2024-12-10 PROCEDURE — 99396 PREV VISIT EST AGE 40-64: CPT | Performed by: OBSTETRICS & GYNECOLOGY

## 2024-12-10 PROCEDURE — 88175 CYTOPATH C/V AUTO FLUID REDO: CPT | Performed by: OBSTETRICS & GYNECOLOGY

## 2024-12-10 NOTE — PROGRESS NOTES
[facility]  Obstetrics and Gynecology  History & Physical    CC: Patient presents for a well woman exam     Subjective:     HPI: Anna Palacios is a 54 year old  female here for a well women exam. Patient reports overall doing ok. Hx of dilatation, hysteroscopy and curettage with endometrial ablation on 10/19/2022.  No further bleeding since procedure. The patient has a history of breast cancer with recent treatment. Her last radiation was last month. Next mammogram 4-6 months after radiation. Planning on further oral therapy with oncology. Reports vaginal odor noted but no discharge.     OB History:  OB History    Para Term  AB Living   2 2 2 0 0 2   SAB IAB Ectopic Multiple Live Births   0 0 0 0 2      # Outcome Date GA Lbr Joshua/2nd Weight Sex Type Anes PTL Lv   2 Term 02 40w0d   F NORMAL SPONT   ESPERANZA   1 Term 10/31/99 40w0d   M NORMAL SPONT   ESPERANZA      Obstetric Comments   Menarche: 12-12 y/o   Menopause in 10/2022 related to ablation   OCP use x 5 months   Breastfeed: YES   BRA SIZE: 36/38B       Gyne History:  Hx Prior Abnormal Pap: No  Pap Date: 21  Pap Result Notes: wnl  Patient's last menstrual period was 10/01/2022 (approximate).  NILM, HPV neg 2021  denies history of STDs (non-HPV).   Sexual history: Active? Not current   Birth control? none    Meds:  Medications Ordered Prior to Encounter[1]    All:  Allergies[2]    PMH:  Past Medical History:    Anesthesia complication    gets shakey    Breast cancer (HCC)    DCIS (ductal carcinoma in situ) of breast    DUB (dysfunctional uterine bleeding)    Headache disorder    sometimes    High cholesterol    Sleep disturbance    some insomnia lately due to menopause    Visual impairment    reading glasses    Wears glasses    only for reading    Weight gain    through menopause       Immunization History:   Immunization History   Administered Date(s) Administered    Covid-19 Vaccine Pfizer 30 mcg/0.3 ml 2021, 2021,  12/16/2021    Covid-19 Vaccine Pfizer Bivalent 30mcg/0.3mL 11/04/2022    FLULAVAL 6 months & older 0.5 ml Prefilled syringe (96574) 10/12/2021, 10/25/2022    FLUZONE 6 months and older PFS 0.5 ml (79451) 09/30/2016, 10/20/2017, 10/25/2018, 10/18/2019, 10/09/2020, 10/03/2023    Influenza 11/13/2008, 10/15/2009, 12/21/2010, 09/30/2011, 11/12/2012, 11/12/2013, 11/05/2015, 09/30/2016, 10/20/2017, 10/20/2017    Influenza Vaccine, trivalent (IIV3), PF 0.5mL ([id number]) 10/07/2024    TD 11/13/2008    TDAP 11/12/2012, 02/25/2022    Zoster Vaccine Recombinant Adjuvanted (Shingrix) 02/25/2022, 04/26/2022       PSH:  Past Surgical History:   Procedure Laterality Date    Colonoscopy  5-18-23    Colonoscopy      D & c  04/21/2022    Dilation/curettage,diagnostic      Endometrial ablation      Endometrial biopsy - jar(s): 2  04/21/2022    Hysteroscopy  04/21/2022    Lumpectomy left      [place] teeth removed         Social History:  Social History     Socioeconomic History    Marital status:      Spouse name: Not on file    Number of children: 2    Years of education: Not on file    Highest education level: Not on file   Occupational History    Occupation: worked as a teacher - on leave   Tobacco Use    Smoking status: Never     Passive exposure: Never    Smokeless tobacco: Never   Vaping Use    Vaping status: Never Used   Substance and Sexual Activity    Alcohol use: Never    Drug use: Never    Sexual activity: Not Currently     Partners: Male   Other Topics Concern    Caffeine Concern Yes     Comment: occ pop    Exercise Yes     Comment: 3x/week or daily walking     Seat Belt Yes    Special Diet Not Asked    Stress Concern Not Asked    Weight Concern Not Asked     Service Not Asked    Blood Transfusions Not Asked    Occupational Exposure Not Asked    Hobby Hazards Not Asked    Sleep Concern Not Asked    Back Care Not Asked    Bike Helmet Not Asked    Self-Exams Not Asked   Social History Narrative    Not on file      Social Drivers of Health     Financial Resource Strain: Not on file   Food Insecurity: Not on file   Transportation Needs: Not on file   Physical Activity: Not on file   Stress: Not on file   Social Connections: Not on file   Housing Stability: Not on file         Patient feels unsafe or threatened?: denies    Abuse: denies physical, sexual or mental.     Family History:  Family History   Problem Relation Age of Onset    Cancer Father 74        prostate with lung mets    Prostate Cancer Father     Hypertension Mother     Other (lung cancer) Paternal Uncle     Other (esophagus cancer) Paternal Cousin        Health maintenance:  Mammogram (age 41 and q1-2yr): per Dr. Davenport   Colonoscopy (age 46 and q10yr): 2023 with polyp noted and removed, return in fall of 2025    Review of Systems:  General: no complaints per category. See HPI for additional information.   Breast: no complaints per category. See HPI for additional information.   Respiratory: no complaints per category. See HPI for additional information.   Cardiovascular: no complaints per category. See HPI for additional information.   GI: no complaints per category. See HPI for additional information.   : no complaints per category. See HPI for additional information.   Heme: no complaints per category. See HPI for additional information.       Objective:     Vitals:    12/10/24 1144   BP: 110/70   Pulse: 98   Weight: 193 lb (87.5 kg)   Height: 65.75\"         Body mass index is 31.39 kg/m².    General: AAO.NAD.   CVS exam: normal peripheral perfusion  Chest: non-labored breathing, no tachypnea   Breast:  overall symmetrical with radiation changes on left breast, no dominant or suspicious mass, no skin or nipple changes on right breast and no axillary adenopathy  Abdominal exam:  soft, nontender, nondistended  Pelvic exam:   VULVA: normal appearing vulva with no masses, tenderness or lesions  PERINEUM:  normal appearing, no lesions   URETHRAL MEATUS:   normal appearing, no lesions   VAGINA: mild atrophic appearing vagina with normal color and discharge, no lesions  CERVIX: normal appearing cervix without discharge or lesions  UTERUS: uterus is normal size, shape, consistency and nontender  ADNEXA: normal adnexa in size, nontender and no masses  PERIRECTAL: normal appearing, no lesions   Ext: non-tender, no edema    Assessment:     Anna Palacios is a 54 year old  female here for a well women exam.       Plan:       Problem List Items Addressed This Visit    None  Visit Diagnoses       Well woman exam with routine gynecological exam    -  Primary    Screening for cervical cancer        Relevant Orders    ThinPrep PAP with HPV Reflex Request B    Vaginitis and vulvovaginitis        Relevant Orders    Vaginitis Vaginosis PCR Panel            Cervical cancer screening  - discussion held with the patient about ASCCP guidelines  - repeat pap smear today   Health maintenance  - encouraged to maintain weight at healthy BMI  - discussed importance of exercise and healthy eating  - self breast exam instructions provided   - bilateral screening mammogram recommendations discussed and order provided by Dr. Davenport    - PMB precautions provided   Vaginal atrophy  - OTC remedies reviewed and recommended   - ph balance precautions provided including baking soda soaks     Problem List Items Addressed This Visit    None  Visit Diagnoses       Well woman exam with routine gynecological exam    -  Primary    Screening for cervical cancer        Relevant Orders    ThinPrep PAP with HPV Reflex Request B    Vaginitis and vulvovaginitis        Relevant Orders    Vaginitis Vaginosis PCR Panel                  All of the findings and plan were discussed with the patient.  She notes understanding and agrees with the plan of care.  All questions were answered to the best of my ability at this time.      RTC in 1 year for a well woman exam or sooner if needed     Licha García MD    [facility] - OBGYN      Discussed with patient that there will not be further notification of normal or benign results other than receiving results on mychart. A mychart message or telephone call will be placed by the physician and/or office staff if results are abnormal.       Note to patient and family   The 21st Century Cures Act makes medical notes available to patients in the interest of transparency.  However, please be advised that this is a medical document.  It is intended as [id number] communication.  It is written and medical language may contain abbreviations or verbiage that are technical and unfamiliar.  It may appear blunt or direct.  Medical documents are intended to carry relevant information, facts as evident, and the clinical opinion of the practitioner.      This note could include assistance by Dragon voice recognition. Errors in content may be related to improper recognition by the system; efforts to review and correct have been done but errors may still exist.            [1]   Current Outpatient Medications on File Prior to Visit   Medication Sig Dispense Refill    Calcium Carbonate (CALCIUM 600 OR) Take 600 mg by mouth daily.      Multiple Vitamins-Minerals (MULTI-VITAMIN/MINERALS) Oral Tab Take 1 tablet by mouth daily.      Cholecalciferol (VITAMIN D) 50 MCG (2000 UT) Oral Cap Take 1 capsule (2,000 Units total) by mouth daily.      Nystatin 002250 UNIT/GM External Powder Apply 1 Application  topically daily.      ketoconazole 2 % External Cream Apply to AA QD-BID prn. 60 g 2    hydrocortisone 2.5 % External Cream Use on AA QD-BID prn (Patient not taking: Reported on 10/25/2024) 30 g 2     No current facility-administered medications on file prior to visit.   [2]   Allergies  Allergen Reactions    Penicillins ITCHING     As a child

## 2024-12-11 LAB
BV BACTERIA DNA VAG QL NAA+PROBE: NEGATIVE
C GLABRATA DNA VAG QL NAA+PROBE: NEGATIVE
C KRUSEI DNA VAG QL NAA+PROBE: NEGATIVE
CANDIDA DNA VAG QL NAA+PROBE: NEGATIVE
T VAGINALIS DNA VAG QL NAA+PROBE: NEGATIVE

## 2024-12-13 NOTE — Clinical Note
Wondered about genetic testing, given possible triple neg and father with advanced prostate cancer. No Alert and oriented to person, place and time

## 2024-12-17 LAB
.: NORMAL
.: NORMAL

## 2024-12-18 LAB — HPV E6+E7 MRNA CVX QL NAA+PROBE: NEGATIVE

## 2025-01-03 ENCOUNTER — LAB ENCOUNTER (OUTPATIENT)
Dept: LAB | Age: 55
End: 2025-01-03
Attending: INTERNAL MEDICINE
Payer: COMMERCIAL

## 2025-01-03 DIAGNOSIS — N91.2 AMENORRHEA: ICD-10-CM

## 2025-01-03 DIAGNOSIS — Z17.0 MALIGNANT NEOPLASM OF OVERLAPPING SITES OF LEFT BREAST IN FEMALE, ESTROGEN RECEPTOR POSITIVE (HCC): ICD-10-CM

## 2025-01-03 DIAGNOSIS — C50.812 MALIGNANT NEOPLASM OF OVERLAPPING SITES OF LEFT BREAST IN FEMALE, ESTROGEN RECEPTOR POSITIVE (HCC): ICD-10-CM

## 2025-01-03 LAB
ALBUMIN SERPL-MCNC: 4.3 G/DL (ref 3.2–4.8)
ALBUMIN/GLOB SERPL: 1.3 {RATIO} (ref 1–2)
ALP LIVER SERPL-CCNC: 102 U/L
ALT SERPL-CCNC: 21 U/L
ANION GAP SERPL CALC-SCNC: 8 MMOL/L (ref 0–18)
AST SERPL-CCNC: 20 U/L (ref ?–34)
BASOPHILS # BLD AUTO: 0.03 X10(3) UL (ref 0–0.2)
BASOPHILS NFR BLD AUTO: 0.6 %
BILIRUB SERPL-MCNC: 0.3 MG/DL (ref 0.3–1.2)
BUN BLD-MCNC: 16 MG/DL (ref 9–23)
CALCIUM BLD-MCNC: 9.7 MG/DL (ref 8.7–10.4)
CHLORIDE SERPL-SCNC: 106 MMOL/L (ref 98–112)
CO2 SERPL-SCNC: 28 MMOL/L (ref 21–32)
CREAT BLD-MCNC: 0.72 MG/DL
EGFRCR SERPLBLD CKD-EPI 2021: 100 ML/MIN/1.73M2 (ref 60–?)
EOSINOPHIL # BLD AUTO: 0.18 X10(3) UL (ref 0–0.7)
EOSINOPHIL NFR BLD AUTO: 3.7 %
ERYTHROCYTE [DISTWIDTH] IN BLOOD BY AUTOMATED COUNT: 11.9 %
ESTRADIOL SERPL-MCNC: 14.4 PG/ML
FASTING STATUS PATIENT QL REPORTED: NO
FSH SERPL-ACNC: 97.6 MIU/ML
GLOBULIN PLAS-MCNC: 3.4 G/DL (ref 2–3.5)
GLUCOSE BLD-MCNC: 83 MG/DL (ref 70–99)
HCT VFR BLD AUTO: 42.7 %
HGB BLD-MCNC: 13.9 G/DL
IMM GRANULOCYTES # BLD AUTO: 0.02 X10(3) UL (ref 0–1)
IMM GRANULOCYTES NFR BLD: 0.4 %
LDH SERPL L TO P-CCNC: 177 U/L
LYMPHOCYTES # BLD AUTO: 1.24 X10(3) UL (ref 1–4)
LYMPHOCYTES NFR BLD AUTO: 25.5 %
MCH RBC QN AUTO: 28.7 PG (ref 26–34)
MCHC RBC AUTO-ENTMCNC: 32.6 G/DL (ref 31–37)
MCV RBC AUTO: 88 FL
MONOCYTES # BLD AUTO: 0.58 X10(3) UL (ref 0.1–1)
MONOCYTES NFR BLD AUTO: 11.9 %
NEUTROPHILS # BLD AUTO: 2.82 X10 (3) UL (ref 1.5–7.7)
NEUTROPHILS # BLD AUTO: 2.82 X10(3) UL (ref 1.5–7.7)
NEUTROPHILS NFR BLD AUTO: 57.9 %
OSMOLALITY SERPL CALC.SUM OF ELEC: 294 MOSM/KG (ref 275–295)
PLATELET # BLD AUTO: 299 10(3)UL (ref 150–450)
POTASSIUM SERPL-SCNC: 4.4 MMOL/L (ref 3.5–5.1)
PROT SERPL-MCNC: 7.7 G/DL (ref 5.7–8.2)
RBC # BLD AUTO: 4.85 X10(6)UL
SODIUM SERPL-SCNC: 142 MMOL/L (ref 136–145)
WBC # BLD AUTO: 4.9 X10(3) UL (ref 4–11)

## 2025-01-03 PROCEDURE — 83615 LACTATE (LD) (LDH) ENZYME: CPT

## 2025-01-03 PROCEDURE — 85025 COMPLETE CBC W/AUTO DIFF WBC: CPT

## 2025-01-03 PROCEDURE — 82670 ASSAY OF TOTAL ESTRADIOL: CPT

## 2025-01-03 PROCEDURE — 83001 ASSAY OF GONADOTROPIN (FSH): CPT

## 2025-01-03 PROCEDURE — 36415 COLL VENOUS BLD VENIPUNCTURE: CPT

## 2025-01-03 PROCEDURE — 80053 COMPREHEN METABOLIC PANEL: CPT

## 2025-01-09 ENCOUNTER — OFFICE VISIT (OUTPATIENT)
Age: 55
End: 2025-01-09
Attending: INTERNAL MEDICINE
Payer: COMMERCIAL

## 2025-01-09 VITALS
WEIGHT: 194 LBS | HEART RATE: 76 BPM | SYSTOLIC BLOOD PRESSURE: 151 MMHG | BODY MASS INDEX: 31.93 KG/M2 | OXYGEN SATURATION: 98 % | HEIGHT: 65.16 IN | TEMPERATURE: 99 F | RESPIRATION RATE: 18 BRPM | DIASTOLIC BLOOD PRESSURE: 80 MMHG

## 2025-01-09 DIAGNOSIS — C50.812 MALIGNANT NEOPLASM OF OVERLAPPING SITES OF LEFT BREAST IN FEMALE, ESTROGEN RECEPTOR POSITIVE (HCC): Primary | ICD-10-CM

## 2025-01-09 DIAGNOSIS — Z78.0 ASYMPTOMATIC MENOPAUSE: ICD-10-CM

## 2025-01-09 DIAGNOSIS — Z17.0 MALIGNANT NEOPLASM OF OVERLAPPING SITES OF LEFT BREAST IN FEMALE, ESTROGEN RECEPTOR POSITIVE (HCC): Primary | ICD-10-CM

## 2025-01-09 RX ORDER — LETROZOLE 2.5 MG/1
2.5 TABLET, FILM COATED ORAL DAILY
Qty: 90 TABLET | Refills: 3 | Status: SHIPPED | OUTPATIENT
Start: 2025-01-09

## 2025-01-09 NOTE — PROGRESS NOTES
Cancer Center Progress Note    Problem List:      Patient Active Problem List   Diagnosis    Candidal intertrigo    Class 1 obesity due to excess calories without serious comorbidity with body mass index (BMI) of 30.0 to 30.9 in adult    Hot flushes, perimenopausal    Intramural leiomyoma of uterus    Cystocele, midline    Status post hysteroscopic polypectomy    Preop testing    Abnormal uterine bleeding    Menorrhagia with regular cycle    Status post endometrial ablation    Special screening for malignant neoplasm of colon    Benign neoplasm of ascending colon    History of adenomatous polyp of colon    Benign neoplasm of descending colon    Invasive ductal carcinoma of left breast (HCC)    Malignant neoplasm of overlapping sites of left breast in female, estrogen receptor positive (HCC)       Interim History:    Anna Palacios presents today for evaluation and management of a diagnosis of left breast cancer.    She had four cycles of TC chemotherapy completed in 9/2024. She completed radiation on 11/20/20244.     She still has fatigue but she has had improvement. She is walking more. She still has some soreness in thel eft chest after radiation. She has no other new pain. She has no mouth pain.    The patient had a metastatic work up with bone scan and CT scan of the CAP that were negative for distant metastatic disease.       Review of Systems:   Constitutional: Negative for anorexia, chills, night sweats and weight loss.  Respiratory: Negative for cough, hemoptysis, chest pain, or dyspnea.  Cardiovascular: Negative for angina, orthopnea or palpitations.  Gastrointestinal: Negative for nausea, vomiting, change in bowel habits, diarrhea, constipation and abdominal pain.  Integument/breast: Negative for rash, skin lesions, and pruritus.  Psychiatric: The patient's mood was calm and appropriate for this visit.  The pertinent positives and negatives were described. All other systems were negative.    PMH/PSH:  Past  Medical History:    Anesthesia complication    gets shakey    Breast cancer (HCC)    DCIS (ductal carcinoma in situ) of breast    DUB (dysfunctional uterine bleeding)    Headache disorder    sometimes    High cholesterol    Sleep disturbance    some insomnia lately due to menopause    Visual impairment    reading glasses    Wears glasses    only for reading    Weight gain    through menopause       Past Surgical History:   Procedure Laterality Date    Colonoscopy  5-18-23    Colonoscopy      D & c  04/21/2022    Dilation/curettage,diagnostic      Endometrial ablation      Endometrial biopsy - jar(s): 2  04/21/2022    Hysteroscopy  04/21/2022    Lumpectomy left      [place] teeth removed         Family History Reviewed:  Family History   Problem Relation Age of Onset    Cancer Father 74        prostate with lung mets    Prostate Cancer Father     Hypertension Mother     Other (lung cancer) Paternal Uncle     Other (esophagus cancer) Paternal Cousin        Allergies:     Allergies   Allergen Reactions    Penicillins ITCHING     As a child       Medications:   Calcium Carbonate (CALCIUM 600 OR) Take 600 mg by mouth daily.      Multiple Vitamins-Minerals (MULTI-VITAMIN/MINERALS) Oral Tab Take 1 tablet by mouth daily.      Cholecalciferol (VITAMIN D) 50 MCG (2000 UT) Oral Cap Take 1 capsule (2,000 Units total) by mouth daily.      Nystatin 515106 UNIT/GM External Powder Apply 1 Application  topically daily.      ketoconazole 2 % External Cream Apply to AA QD-BID prn. 60 g 2         Vital Signs:      Height: 165.5 cm (5' 5.16\") (01/09 1142)  Weight: 88 kg (194 lb) (01/09 1142)  BSA (Calculated - sq m): 1.96 sq meters (01/09 1142)  Pulse: 76 (01/09 1142)  BP: 151/80 (01/09 1142)  Temp: 99.1 °F (37.3 °C) (01/09 1142)  Do Not Use - Resp Rate: --  SpO2: 98 % (01/09 1142)      Performance Status:  ECOG 0: Fully active, able to carry on all pre-disease performance without restriction     Physical Examination:    Constitutional:  Patient is alert and oriented x 3, not in acute distress.  Respiratory: Clear to auscultation and percussion. No rales.  No wheezes.  Cardiovascular: Regular rate and rhythm. No murmurs.  Gastrointestinal: Soft, non tender with good bowel sounds.  Musculoskeletal: No edema. No calf tenderness.  Skin: No suspicious skin lesion, no rash, no ulceration.  Lymphatics: There is no palpable lymphadenopathy throughout in the cervical, supraclavicular, or axillary regions.  Psychiatric: The patient's mood is calm and appropriate for this visit.       Labs reviewed at this visit:     Lab Results   Component Value Date    WBC 4.9 01/03/2025    RBC 4.85 01/03/2025    HGB 13.9 01/03/2025    HCT 42.7 01/03/2025    MCV 88.0 01/03/2025    MCH 28.7 01/03/2025    MCHC 32.6 01/03/2025    RDW 11.9 01/03/2025    .0 01/03/2025     Lab Results   Component Value Date     01/03/2025    K 4.4 01/03/2025     01/03/2025    CO2 28.0 01/03/2025    BUN 16 01/03/2025    CREATSERUM 0.72 01/03/2025    GLU 83 01/03/2025    CA 9.7 01/03/2025    ALKPHO 102 01/03/2025    ALT 21 01/03/2025    AST 20 01/03/2025    BILT 0.3 01/03/2025    ALB 4.3 01/03/2025    TP 7.7 01/03/2025       Radiologic imaging reviewed at this visit:    Bone scan on 6/14/2024:  FINDINGS:    IMAGED AREA:  Whole-body   ABNORMALITIES:  Mildly increased radiotracer uptake involving the right greater than left ischial tuberosity likely due to enthesopathy at the hamstring insertion.  There is no evidence of lytic or blastic lesion to suggest metastatic disease.  There is   mild uptake noted on the right at L3-4 facet consistent with degenerative changes.  There is a few scattered areas of mildly increased uptake within the bone marrow of the midthoracic spine without any lesion seen on noncontrast CT is likely due to   physiologic uptake rather than metastatic disease..  Sclerotic foci within the left sacrum and left ilium measuring under 1 cm sized without elevated  radiotracer uptake most likely represent bone islands.   OTHER:  Probable area of scarring noted within the left lateral breast.  Focal scarring also noted within the left axilla.         CT CAP on 6/12/2024:  FINDINGS:       CHEST:    LUNGS:  No visible pulmonary disease.    MEDIASTINUM:  No mass or adenopathy.    SANTANA:  No mass or adenopathy.    CARDIAC:  No enlargement, pericardial thickening, or significant coronary artery calcification.  PLEURA:  No mass or effusion.    CHEST WALL:  There is a spiculated nodular density within the inferolateral quadrant of the left breast measuring 1.4 x 2.6 cm on axial imaging, best seen on image 75 of series 2. Please correlate clinically with patient's history of left breast cancer.   There is also evidence of left axillary lymphadenopathy, the largest lymph node measuring 1.3 x 2.5 cm in short axis and transverse dimensions respectively, best seen on image 39.  AORTA:  No aneurysm or dissection.    VASCULATURE:  No visible pulmonary arterial thrombus or attenuation.       ABDOMEN/PELVIS:  LIVER:  No enlargement, atrophy, abnormal density, or significant focal lesion.    BILIARY:  No visible dilatation or calcification.    PANCREAS:  No lesion, fluid collection, ductal dilatation, or atrophy.    SPLEEN:  No enlargement or focal lesion.    KIDNEYS:  No mass, obstruction, or calcification.    ADRENALS:  No mass or enlargement.    AORTA:  No aneurysm or dissection.    RETROPERITONEUM:  No mass or adenopathy.    BOWEL/MESENTERY:  No visible mass, obstruction, or bowel wall thickening.    ABDOMINAL WALL:  No mass or hernia.    URINARY BLADDER:  No visible focal wall thickening, lesion, or calculus.    PELVIC NODES:  No adenopathy.    PELVIC ORGANS:  There is suggestion of a uterine fibroid along the left side of the uterine body/lower uterine segment measuring 2.1 x 2.3 cm.  No free fluid or inflammatory changes within the pelvis identified.    BONES:  Exaggeration of the  thoracic kyphosis with moderate to extensive multilevel disc disease of the thoracic spine.  No lytic, blastic or destructive osseous changes are identified.     Impression   CONCLUSION:    1. There is a spiculated nodular density within the inferolateral quadrant of the left breast measuring 1.3 x 2.5 cm.  2. There is left axillary lymphadenopathy, with the largest lymph node measuring 1.4 cm in short axis dimension.  3. Lungs are clear.  4. No metastatic disease to the abdomen or pelvis suggested.  5. There is suggestion of a 2.3 cm uterine fibroid along the left side of the body/lower uterine segment.  Further assessment with pelvic ultrasound imaging for confirmation is recommended.          Assessment/Plan:     Left breast cancer in overlapping quadrants (3 o'clock):  Invasive ductal carcinoma, grade III  Lumpectomy on 4/12/2024  1.8 cm IDC  ER 15 %  NV 15%  Ki-67 >90%  Her2 1+  SLN 0/3 (1 node with ITC)  Oncotype Dx RS 69  Single gene studys on Oncotype with ER/NV/Her2 negative  TC x 4 completed in 9/2024    She is doing well with DAWIT. I again discussed the benefit of adjuvant endocrine therapy. She has low level of ER/NV and oncotype was negative for ER/NV expression. I recommended that she schedule a DEXA scan. I discussed the option of endocrine therapy. We decided to proceed with Letrozole 2.5 mg daily. We would have low tolerance for side effects since she has very low ER/NV and this was negative on Oncotype Dx. I will see her in three months to reassess. She will contact me with any concerns about side effects. We will get the DEXA prior to the next visit and then decide about an IV bisphosphonate.      Uterine fibroid:    She has gyne follow up for work up.      Nilson Sinclair MD

## 2025-01-09 NOTE — PROGRESS NOTES
Patient here for f/u for breast cancer. Patient completed RT on 11/20. Patient states she has breast rash that she takes a steroid cream for.     Education Record    Learner:  Patient    Disease / Diagnosis: breast cancer     Barriers / Limitations:  None   Comments:    Method:  Discussion   Comments:    General Topics:  Medication, Pain, Side effects and symptom management, and Plan of care reviewed   Comments:    Outcome:  Shows understanding   Comments:

## 2025-01-15 ENCOUNTER — TELEPHONE (OUTPATIENT)
Age: 55
End: 2025-01-15

## 2025-01-16 ENCOUNTER — OFFICE VISIT (OUTPATIENT)
Age: 55
End: 2025-01-16
Attending: INTERNAL MEDICINE
Payer: COMMERCIAL

## 2025-01-16 DIAGNOSIS — Z71.9 COUNSELING, UNSPECIFIED: ICD-10-CM

## 2025-01-16 DIAGNOSIS — Z85.3 PERSONAL HISTORY OF BREAST CANCER: ICD-10-CM

## 2025-01-16 DIAGNOSIS — Z08 ENCOUNTER FOR FOLLOW-UP EXAMINATION AFTER COMPLETED TREATMENT FOR MALIGNANT NEOPLASM: Primary | ICD-10-CM

## 2025-01-16 NOTE — PROGRESS NOTES
I met with Anna for a Survivorship Clinic visit to provide a survivorship care plan (SCP) and information related to post-treatment care. She is a 53 year old female who on 3/20/24 had a screening mammogram with noted asymmetries in the left breast. On 4/2/24 diagnostic imaging confirmed a 1.5 cm nodule at 3:00 in the left breast, 6cm from nipple. On 4/12/24 ultrasound guided biopsy was done of left breast and showed IDC, grade 3, largest focus 0.7 cm,ER/WA low positive, HER2-, Ki-67 >90%.     On 5/16/24 Dr. Lucero Davenport performed left breast lumpectomy with sentinel lymph node biopsy. Pathology showed IDC with prominent lymphocytic response, grade 3, largest size 1.8 cm with satellite nodule up to 0.6 cm, margins negative,1/3 LN with ITCs.     She was seen by Dr. Nilson Sinclair who performed Oncotype DX with recurrence score of 68 and showed single gene studies with ER/WA/HER2 negative. Dr. Sinclair recommended adjuvant chemotherapy. She was given adjuvant chemotherapy with TC x 4 cycles given from 7/12/24-9/13/24. She then received radiation therapy with Dr. Aure Fuentes from 10/24/24-11/20/24. After much discussion with Anna, Dr. Sinclair recommended adjuvant Letrozole. (See Oncology Treatment Summary SCP for details).      Current condition/issues: Anna reports continued fatigue. She started Letrozole on 1/13/24 (3 days) and has noted lower GI effects with loose stools. She has had menopausal issues for awhile with hot flashes and hopes that Letrozole does not make this worse. She will have baseline bone density on 1/22 and currently takes Vitamin D3 2000IU/day. She continues to have left breast discomfort and is restricting her movements in the left arm to protect this area. She has no post-RT or surgical swelling noted. Her sleep has been affected for awhile and she attributes to menopause.    She denies depression but does worry about recurrence. She reports having good support from her  and family. She  has not utilized support groups or counseling and has tried to keep a positive attitude.     She has had stable weight through her treatment, BMI is 31. She tries to walk most days, based on weather. She has a stationery bike she uses if she can't get out to walk and sometimes intentionally walks around her house to get exercise. She admits to need for dietary improvements. She drinks no alcohol and is a non-smoker.     Survivorship Care Plan and letter: Anna was provided a hard copy of the SCP and a letter that outlined the purpose of the visit and plan.  We reviewed all elements of both documents. She is aware that a letter and SCP will be sent to her PCP, Dr. Katty Blanton.     Reviewed Cancer Surveillance and that Dr. Sinclair will oversee this care with Dr. Davenport. She will see him on 4/10. She should schedule bilateral mammogram in May, followed by an appointment with Dr. Davenport. She will see Dr. Fuentes on 2/18. She has bone density scheduled on 1/22.      Reviewed Schedule of other clinic visits with Primary Care and specialists: Dr. Blanton will continue to manage all general health care recommended for age and gender including cancer screening tests.  She is due for colon screening this year 11/25, is up-to-date with other screening including annual skin screens. She recently saw gynecology who is monitoring uterine fibroid. She was encouraged to continue to see specialists at usual intervals.     Reviewed concerning symptoms that she should report to any Provider.      Reviewed possible late and long-term effects related to the treatment that was received.  We reviewed care of the surgical arm and management of hormone related side effects including vaginal dryness resources if needed.      Reviewed common cancer survivor issues and resources available. Reviewed psychosocial support options to consider, nutrition, weight loss, exercise, stress management options. She was encouraged to increase exercise to assist  with fatigue and perform post-surgical arm exercises for the left arm.     Reviewed Lifestyle/behaviors that can affect ongoing health, including the risk for the cancer coming back or developing another cancer.  We reviewed importance of physical activity including aerobic, strength training and weight bearing exercise.  We reviewed a plant based diet.  We reviewed skin care and sun safety.     Anna received a take-home folder that included the following survivorship resources:   -SCP and patient letter  -Breast Survivorship Guide   -[facility] - nutrition and exercise classes, mind/body programs, education, support groups  -[facility]-education, support groups, special programs, nutrition and exercise classes, movement classes, mind/body programs, survivorship programs, individual counseling  -[facility]  -Medical Fitness Program  -Lymphedema Prevention  -[facility]  -[employer] handout-nutrition, physical activity  -ACS Cancer Screening Guidelines  -Websites: American Cancer Society, Living Beyond Breast Cancer, [employer] for your Life, National Coalition for Cancer Survivorship, ACS Survivorship Network     The patient was given the opportunity to ask questions.  She had a few questions and verbalized understanding of the information we discussed today. Emotional support was provided. I spent 90 minutes providing care (10 minutes reviewing chart, 60 minutes of face to face counseling regarding survivorship education, review of care plan and additional resources and 20 minutes of documentation).  She was encouraged to call with any further questions. [employer]

## 2025-01-22 ENCOUNTER — HOSPITAL ENCOUNTER (OUTPATIENT)
Dept: BONE DENSITY | Age: 55
Discharge: HOME | End: 2025-01-22
Attending: INTERNAL MEDICINE
Payer: COMMERCIAL

## 2025-01-22 DIAGNOSIS — Z78.0 ASYMPTOMATIC MENOPAUSE: ICD-10-CM

## 2025-01-22 DIAGNOSIS — Z17.0 MALIGNANT NEOPLASM OF OVERLAPPING SITES OF LEFT BREAST IN FEMALE, ESTROGEN RECEPTOR POSITIVE (HCC): ICD-10-CM

## 2025-01-22 DIAGNOSIS — C50.812 MALIGNANT NEOPLASM OF OVERLAPPING SITES OF LEFT BREAST IN FEMALE, ESTROGEN RECEPTOR POSITIVE (HCC): ICD-10-CM

## 2025-01-22 PROCEDURE — 77080 DXA BONE DENSITY AXIAL: CPT | Performed by: INTERNAL MEDICINE

## 2025-02-18 ENCOUNTER — HOSPITAL ENCOUNTER (OUTPATIENT)
Dept: RADIATION ONCOLOGY | Facility: HOSPITAL | Age: 55
Discharge: HOME | End: 2025-02-18
Attending: INTERNAL MEDICINE
Payer: COMMERCIAL

## 2025-02-18 VITALS
TEMPERATURE: 99 F | HEART RATE: 86 BPM | SYSTOLIC BLOOD PRESSURE: 143 MMHG | BODY MASS INDEX: 32 KG/M2 | DIASTOLIC BLOOD PRESSURE: 81 MMHG | WEIGHT: 195.19 LBS | OXYGEN SATURATION: 96 % | RESPIRATION RATE: 20 BRPM

## 2025-02-18 DIAGNOSIS — C50.912 MALIGNANT NEOPLASM OF LEFT BREAST IN FEMALE, ESTROGEN RECEPTOR POSITIVE, UNSPECIFIED SITE OF BREAST (HCC): Primary | ICD-10-CM

## 2025-02-18 DIAGNOSIS — Z17.0 MALIGNANT NEOPLASM OF LEFT BREAST IN FEMALE, ESTROGEN RECEPTOR POSITIVE, UNSPECIFIED SITE OF BREAST (HCC): Primary | ICD-10-CM

## 2025-02-18 PROCEDURE — 99213 OFFICE O/P EST LOW 20 MIN: CPT

## 2025-02-18 NOTE — PROGRESS NOTES
Nursing Follow-Up Note    Patient: Anna Palacios  YOB: 1970  Age: 54 year old  Radiation Oncologist: Dr. Aure Fuentes  Referring Physician: Dr. Sinclair  Chief Complaint:   Chief Complaint   Patient presents with    Follow - Up     LEFT BREAST CANCER     Date: 2/17/2025    Toxicities: N/A    Vital Signs: /81 (BP Location: Right arm, Patient Position: Sitting, Cuff Size: large)   Pulse 86   Temp 99.4 °F (37.4 °C) (Tympanic)   Resp 20   Wt 88.5 kg (195 lb 3.2 oz)   LMP 10/01/2022 (Approximate)   SpO2 96%   BMI 32.33 kg/m² ,   Wt Readings from Last 6 Encounters:   02/18/25 88.5 kg (195 lb 3.2 oz)   01/09/25 88 kg (194 lb)   12/10/24 87.5 kg (193 lb)   10/07/24 89.4 kg (197 lb)   10/04/24 89.4 kg (197 lb)   09/27/24 87.9 kg (193 lb 12.8 oz)       Allergies:  Allergies[1]    Nursing Note: Hx of L breast IDC, grade 3, ER/CO +, HER2-, Ki67>90%. S/P lumpectomy 4/12/24. Completed 4 cycles of chemo 9/20/24. Completed RT to L breast 11/20/24. Followed up with Dr. Sinclair in Jan (q3mo), remains on Letrozole. C/O hot flashes, joint aches. States L breast with hyperpigmentation, no desquamation. Occ discomfort to site. Has full ROM to LUE. To schedule mammo in April 2025, then follow-up with Dr. Davenport.         [1]   Allergies  Allergen Reactions    Penicillins ITCHING     As a child

## 2025-02-18 NOTE — PROGRESS NOTES
[facility]  Radiation Oncology Follow-up    Patient Name: Anna Palacios   MRN: [id number]  Referring Physician: Lucero Davenport MD; Nilson Sinclair MD     Diagnosis: L breast IDC, grade 3, ER/KS+, HER2-, Ki-67 >90%, pT1c (1.8 cm) N0i+ (1/3 with ITCs) M0     Oncologic History  24: L breast lumpectomy.  Oncotype 68, adjuvant TC x4.   24: completed adjuvant RT to the L breast 4005 cGy in 15 fractions prone, boost 1000 cGy in 5 fractions.  Endocrine therapy.     Cancer Screening  -Colonoscopy: 2023, on 2yr schedule     Interval History:  The patient is a 54 year old female with above diagnosis and treatment rendered who presents today for follow-up.    Doing ok overall  Having some hot flashes and joint pain from AI  L breast soreness seems to be getting better  Still has intermittent discomfort near axilla    Medications  Current Outpatient Medications   Medication Sig Dispense Refill    letrozole 2.5 MG Oral Tab Take 1 tablet (2.5 mg total) by mouth daily. 90 tablet 3    Calcium Carbonate (CALCIUM 600 OR) Take 600 mg by mouth daily.      Multiple Vitamins-Minerals (MULTI-VITAMIN/MINERALS) Oral Tab Take 1 tablet by mouth daily.      Cholecalciferol (VITAMIN D) 50 MCG (2000 UT) Oral Cap Take 1 capsule (2,000 Units total) by mouth daily.      Nystatin 856749 UNIT/GM External Powder Apply 1 Application  topically daily.      hydrocortisone 2.5 % External Cream Use on AA QD-BID prn 30 g 2    ketoconazole 2 % External Cream Apply to AA QD-BID prn. 60 g 2       Physical Exam  Vitals:    25 1124   BP: 143/81   Pulse: 86   Resp: 20   Temp: 99.4 °F (37.4 °C)       ECO    Gen: NAD  HEENT: NCAT, EOMI  Neck: no LAD  CV: RRR  Lungs: CTAB  Ext: wwp  Neuro: CN II-XII grossly intact  Breast: L breast with very faint residual hyperpigmentation, mild edema, no masses or LAD      Assessment: 54 year old female with imaging detected L breast cancer. She underwent margin negative lumpectomy and SLNB showing high  grade IDC, pT1c N0i+ disease - ER/MI low positive, HER2- with very high Ki-67 and Oncotype. She completed adjuvant chemo and completed adjuvant RT late Nov 2024. She continues to recover from RT side effects and is on endocrine therapy.    Plan:     -cont endocrine therapy per [facility]  -to schedule imaging in April  -RTC 1 year, encouraged self massage of breast, consider PT if discomfort persists    Aure Fuentes MD  Radiation Oncology    MDM low including chart review, physical exam, and time spent with the patient in counseling.

## 2025-02-18 NOTE — PATIENT INSTRUCTIONS
- WE WILL CALL TO SCHEDULE YOUR FOLLOW-UP APPOINTMENT WITH  IN 1 YEAR    - CALL CENTRAL SCHEDULING AT [phone] TO SCHEDULE YOUR MAMMOGRAM IN APRIL 2025    - CALL [phone] IF YOU HAVE ANY QUESTIONS/CONCERNS REGARDING RADIATION THERAPY

## 2025-03-19 ENCOUNTER — OFFICE VISIT (OUTPATIENT)
Dept: INTERNAL MEDICINE CLINIC | Facility: CLINIC | Age: 55
End: 2025-03-19
Payer: COMMERCIAL

## 2025-03-19 VITALS
OXYGEN SATURATION: 97 % | SYSTOLIC BLOOD PRESSURE: 116 MMHG | RESPIRATION RATE: 16 BRPM | TEMPERATURE: 99 F | DIASTOLIC BLOOD PRESSURE: 70 MMHG | HEIGHT: 65 IN | WEIGHT: 197.63 LBS | HEART RATE: 86 BPM | BODY MASS INDEX: 32.93 KG/M2

## 2025-03-19 DIAGNOSIS — R20.2 NUMBNESS AND TINGLING IN BOTH HANDS: ICD-10-CM

## 2025-03-19 DIAGNOSIS — C50.812 MALIGNANT NEOPLASM OF OVERLAPPING SITES OF LEFT BREAST IN FEMALE, ESTROGEN RECEPTOR POSITIVE (HCC): ICD-10-CM

## 2025-03-19 DIAGNOSIS — G47.00 INSOMNIA, UNSPECIFIED TYPE: ICD-10-CM

## 2025-03-19 DIAGNOSIS — R20.0 NUMBNESS AND TINGLING IN BOTH HANDS: ICD-10-CM

## 2025-03-19 DIAGNOSIS — Z00.00 ENCOUNTER FOR ROUTINE ADULT MEDICAL EXAMINATION: Primary | ICD-10-CM

## 2025-03-19 DIAGNOSIS — Z00.00 LABORATORY EXAM ORDERED AS PART OF ROUTINE GENERAL MEDICAL EXAMINATION: ICD-10-CM

## 2025-03-19 DIAGNOSIS — Z17.0 MALIGNANT NEOPLASM OF OVERLAPPING SITES OF LEFT BREAST IN FEMALE, ESTROGEN RECEPTOR POSITIVE (HCC): ICD-10-CM

## 2025-03-19 PROCEDURE — 99396 PREV VISIT EST AGE 40-64: CPT | Performed by: INTERNAL MEDICINE

## 2025-03-19 NOTE — PROGRESS NOTES
[facility] Internal Medicine Office Note  Chief Complaint:   Chief Complaint   Patient presents with    Physical       HPI:   This is a 54 year old female coming in for annual physical   HPI  She went through chemo and radiation for breast cancer. She started letrozole and she stopped recently due to hot flashes and hand joint pain side effect. She feels like the hand pain/stiffness is improving now that she is off of the medication per Dr. Sinclair's recommendation. She has an upcoming appt with Dr. Sinclair    Pap smear was in 12/2024   Order for diagnostic mammo is in the system    She has been having pain in bilateral hands and swelling.  Also numbness and tingling worse in fingers 3 through 5 bilaterally.  Symptoms started after beginning letrozole and patient thinks it is related.  She stopped taking letrozole on Friday.  She thinks symptoms have improved a little        Patient Active Problem List   Diagnosis    Candidal intertrigo    Class 1 obesity due to excess calories without serious comorbidity with body mass index (BMI) of 30.0 to 30.9 in adult    Hot flushes, perimenopausal    Intramural leiomyoma of uterus    Cystocele, midline    Status post hysteroscopic polypectomy    Preop testing    Abnormal uterine bleeding    Menorrhagia with regular cycle    Status post endometrial ablation    Special screening for malignant neoplasm of colon    Benign neoplasm of ascending colon    History of adenomatous polyp of colon    Benign neoplasm of descending colon    Invasive ductal carcinoma of left breast (HCC)    Malignant neoplasm of overlapping sites of left breast in female, estrogen receptor positive (HCC)     Past Surgical History:   Procedure Laterality Date    Colonoscopy  5-18-23    Colonoscopy      D & c  04/21/2022    Dilation/curettage,diagnostic      Endometrial ablation      Endometrial biopsy - jar(s): 2  04/21/2022    Hysteroscopy  04/21/2022    Lumpectomy left      [place] teeth removed        Family History   Problem Relation Age of Onset    Cancer Father 74        prostate with lung mets    Prostate Cancer Father     Hypertension Mother     Other (lung cancer) Paternal Uncle     Other (esophagus cancer) Paternal Cousin         I reviewed her's Past Medical History, Past Surgical History, Family History and   Social History updated shows  Social History     Socioeconomic History    Marital status:     Number of children: 2   Occupational History    Occupation: worked as a teacher - on leave   Tobacco Use    Smoking status: Never     Passive exposure: Never    Smokeless tobacco: Never   Vaping Use    Vaping status: Never Used   Substance and Sexual Activity    Alcohol use: Never    Drug use: Never    Sexual activity: Not Currently     Partners: Male   Other Topics Concern    Caffeine Concern Yes     Comment: occ pop    Exercise Yes     Comment: 3x/week or daily walking     Seat Belt Yes     Social Drivers of Health     Food Insecurity: No Food Insecurity (3/19/2025)    NCSS - Food Insecurity     Worried About Running Out of Food in the Last Year: No     Ran Out of Food in the Last Year: No   Transportation Needs: No Transportation Needs (3/19/2025)    NCSS - Transportation     Lack of Transportation: No   Housing Stability: Not At Risk (3/19/2025)    NCSS - Housing/Utilities     Has Housing: Yes     Worried About Losing Housing: No     Unable to Get Utilities: No     Allergies:  Allergies[1]  Current Outpatient Medications   Medication Sig Dispense Refill    Calcium Carbonate (CALCIUM 600 OR) Take 600 mg by mouth daily.      Multiple Vitamins-Minerals (MULTI-VITAMIN/MINERALS) Oral Tab Take 1 tablet by mouth daily.      Cholecalciferol (VITAMIN D) 50 MCG (2000 UT) Oral Cap Take 1 capsule (2,000 Units total) by mouth daily.      Nystatin [id number] UNIT/GM External Powder Apply 1 Application  topically daily.      hydrocortisone 2.5 % External Cream Use on AA QD-BID prn 30 g 2    ketoconazole 2 %  External Cream Apply to AA QD-BID prn. 60 g 2    letrozole 2.5 MG Oral Tab Take 1 tablet (2.5 mg total) by mouth daily. (Patient not taking: Reported on 3/19/2025) 90 tablet 3         REVIEW OF SYSTEMS:   Review of Systems   Constitutional:  Negative for fever.   HENT:  Negative for congestion.    Eyes:  Negative for visual disturbance.   Respiratory:  Negative for shortness of breath.    Cardiovascular:  Negative for chest pain.   Gastrointestinal:  Negative for constipation.   Genitourinary:  Negative for dysuria.   Neurological: Negative.    Hematological: Negative.    Psychiatric/Behavioral: Negative.          EXAM:   /70   Pulse 86   Temp 98.8 °F (37.1 °C) (Temporal)   Resp 16   Ht 5' 5\" (1.651 m)   Wt 197 lb 9.6 oz (89.6 kg)   LMP 10/01/2022 (Approximate)   SpO2 97%   BMI 32.88 kg/m²  Estimated body mass index is 32.88 kg/m² as calculated from the following:    Height as of this encounter: 5' 5\" (1.651 m).    Weight as of this encounter: 197 lb 9.6 oz (89.6 kg).   Vital signs reviewed. Appears stated age, well groomed.  Physical Exam  Constitutional:       General: She is not in acute distress.     Appearance: She is well-developed.   HENT:      Head: Normocephalic and atraumatic.   Eyes:      Conjunctiva/sclera: Conjunctivae normal.   Cardiovascular:      Rate and Rhythm: Normal rate and regular rhythm.      Heart sounds: Normal heart sounds.   Pulmonary:      Effort: Pulmonary effort is normal.      Breath sounds: Normal breath sounds.   Musculoskeletal:      Cervical back: Neck supple.   Skin:     General: Skin is warm and dry.   Neurological:      General: No focal deficit present.      Mental Status: She is alert.   Psychiatric:         Mood and Affect: Mood normal.          ASSESSMENT AND PLAN:   Anna Palacios is a 54 year old female with  1. Encounter for routine adult medical examination    2. Laboratory exam ordered as part of routine general medical examination          The plan is as  follows  Anna was seen today for physical.    Diagnoses and all orders for this visit:    Encounter for routine adult medical examination  -2 year follow up colonoscopy due in 11/2025  -pap smear up to date    Laboratory exam ordered as part of routine general medical examination -reviewed CBC and CMP -normal.  -     Lipid Panel; Future  -     TSH W Reflex To Free T4; Future    Insomnia, unspecified type - she has had trouble falling asleep and notes awakenings due to hot flashes. Discussed sleep hygiene      Numbness and tingling in both hands -recommend wearing wrist braces overnight and during the day as well.  Follow-up with orthopedics hand specialist if symptoms persist.  She thinks the symptoms are side effect from letrozole and she is stopped the medication per Dr. Lundberg's recommendation 5 days ago.  Would expect symptoms to resolve over the next week or two if from medication    Malignant neoplasm of overlapping sites of left breast in female, estrogen receptor positive (HCC) - she sees Dr. Sinclair and Dr. Davenport. She plans to schedule diagnostic mammogram for next month    Orders Placed This Encounter   Procedures    Lipid Panel    TSH W Reflex To Free T4       Meds & Refills for this Visit:  Requested Prescriptions      No prescriptions requested or ordered in this encounter       Imaging & Consults:  None    Health Maintenance Due   Topic Date Due    Pneumococcal Vaccine: 50+ Years (1 of 2 - PCV) Never done    COVID-19 Vaccine (5 - 2024-25 season) 09/01/2024    Annual Physical  03/13/2025    Mammogram  04/02/2025     Patient/Caregiver Education: Patient/Caregiver Education: There are no barriers to learning. Medical education done. Outcome: Patient verbalizes understanding. Patient is notified to call with any questions, complications, allergies, or worsening or changing symptoms.  Patient is to call with any side effects or complications from the treatments as a result of today.     Katty Blanton MD          [1]   Allergies  Allergen Reactions    Penicillins ITCHING     As a child

## 2025-03-19 NOTE — PATIENT INSTRUCTIONS
Melatonin is best taken 2 hours before intended sleep    Benadryl is the active ingredient in ZZZquil over the counter sleep aid. You could take a half or full tablet of benadryl 25mg as needed. Can cause next day drowsiness/feeling groggy    Trazodone is the prescription sleep aid - let me know if you'd like me to send a prescription     Prevnar 20 is the pneumonia vaccine       Wear the carpal tunnel wrist braces overnight and during the day as well   Dr. Blackburn - orthopedic hand specialist   [phone]

## 2025-03-25 ENCOUNTER — LAB ENCOUNTER (OUTPATIENT)
Dept: LAB | Age: 55
End: 2025-03-25
Attending: INTERNAL MEDICINE
Payer: COMMERCIAL

## 2025-03-25 DIAGNOSIS — Z00.00 LABORATORY EXAM ORDERED AS PART OF ROUTINE GENERAL MEDICAL EXAMINATION: ICD-10-CM

## 2025-03-25 LAB
CHOLEST SERPL-MCNC: 241 MG/DL (ref ?–200)
FASTING PATIENT LIPID ANSWER: YES
HDLC SERPL-MCNC: 48 MG/DL (ref 40–59)
LDLC SERPL CALC-MCNC: 149 MG/DL (ref ?–100)
NONHDLC SERPL-MCNC: 193 MG/DL (ref ?–130)
TRIGL SERPL-MCNC: 242 MG/DL (ref 30–149)
TSI SER-ACNC: 0.61 UIU/ML (ref 0.55–4.78)
VLDLC SERPL CALC-MCNC: 46 MG/DL (ref 0–30)

## 2025-03-25 PROCEDURE — 84443 ASSAY THYROID STIM HORMONE: CPT

## 2025-03-25 PROCEDURE — 80061 LIPID PANEL: CPT

## 2025-03-25 PROCEDURE — 36415 COLL VENOUS BLD VENIPUNCTURE: CPT

## 2025-04-10 ENCOUNTER — OFFICE VISIT (OUTPATIENT)
Age: 55
End: 2025-04-10
Attending: INTERNAL MEDICINE
Payer: COMMERCIAL

## 2025-04-10 VITALS
RESPIRATION RATE: 18 BRPM | WEIGHT: 197.5 LBS | OXYGEN SATURATION: 97 % | HEART RATE: 80 BPM | DIASTOLIC BLOOD PRESSURE: 79 MMHG | SYSTOLIC BLOOD PRESSURE: 144 MMHG | HEIGHT: 65.16 IN | BODY MASS INDEX: 32.51 KG/M2 | TEMPERATURE: 98 F

## 2025-04-10 DIAGNOSIS — Z17.0 MALIGNANT NEOPLASM OF OVERLAPPING SITES OF LEFT BREAST IN FEMALE, ESTROGEN RECEPTOR POSITIVE (HCC): Primary | ICD-10-CM

## 2025-04-10 DIAGNOSIS — C50.812 MALIGNANT NEOPLASM OF OVERLAPPING SITES OF LEFT BREAST IN FEMALE, ESTROGEN RECEPTOR POSITIVE (HCC): Primary | ICD-10-CM

## 2025-04-10 NOTE — PROGRESS NOTES
Patient here for 3 month f/u for breast cancer. Patient started letrozole in January 2025 and stopped it on 3/13/25 d/t side effects. Patient c/o joint pain in hands, insomnia, inconsistent mild nausea, hot flashes, fatigue, low mood, headaches.     Education Record    Learner:  Patient    Disease / Diagnosis: breast cancer     Barriers / Limitations:  None   Comments:    Method:  Discussion   Comments:    General Topics:  Medication and Plan of care reviewed   Comments:    Outcome:  Shows understanding   Comments:

## 2025-04-10 NOTE — PROGRESS NOTES
Cancer Center Progress Note    Problem List:      Problem List[1]      History of Present Illness  Mrs. Anna Palacios is a 54 year old female with breast cancer who presents with concerns about endocrine therapy side effects.    She underwent a lumpectomy on April 12, 2024, for a 1.8 cm invasive ductal carcinoma. The tumor was characterized by low levels of estrogen and progesterone receptors, high KI-67, HER2 negative, and three negative sentinel lymph nodes with one having isolated tumor cells. The oncotype recurrence score was high at 69, resembling triple-negative breast cancer. She completed TC chemotherapy in September 2024.    She started letrozole in the second week of January 2025 but discontinued it on March 13, 2025, due to adverse effects. The medication exacerbated her menopause symptoms, including increased frequency of hot flashes, headaches, and joint pain, particularly in her hands, which felt tight and sore. She also experienced low mood, fatigue, and insomnia. Gastrointestinal symptoms included loose stools and mild nausea, primarily in the evenings. She noted improvement in symptoms approximately one to two weeks after stopping letrozole.    She continues to experience menopause symptoms such as insomnia and fatigue, although the frequency of hot flashes has decreased. She is engaging in daily walks and has started slow weightlifting. She has not started any new medications or treatments since stopping letrozole.    Her bone density scan was reported to be good.      Review of Systems:   Constitutional: Negative for  fevers, chills, and weight loss.  Eyes: Negative for visual disturbance, irritation and redness.  Respiratory: Negative for cough, hemoptysis, chest pain, or dyspnea.  Cardiovascular: Negative for angina, orthopnea or palpitations.  Gastrointestinal: Negative for nausea, vomiting, change in bowel habits, diarrhea, constipation and abdominal pain.  Integument/breast: Negative for  rash, skin lesions, and pruritus.  Hematologic/lymphatic: Negative for easy bruising, bleeding, and lymphadenopathy.  Genitourinary: Negative for dysuria or hematuria  Psychiatric: The patient's mood was calm and appropriate for this visit.  The pertinent positives and negatives were described. All other systems were negative.    PMH/PSH:  Past Medical History[2]    Past Surgical History[3]    Family History Reviewed:  Family History[4]    Allergies:     Allergies[5]    Medications:  Medications Ordered Today[6]      Vital Signs:      Height: 165.5 cm (5' 5.16\") (04/10 1152)  Weight: 89.6 kg (197 lb 8 oz) (04/10 1152)  BSA (Calculated - sq m): 1.97 sq meters (04/10 1152)  Pulse: 80 (04/10 1152)  BP: 144/79 (04/10 1152)  Temp: 98 °F (36.7 °C) (04/10 1152)  Do Not Use - Resp Rate: --  SpO2: 97 % (04/10 1152)      Performance Status:  ECOG 0: Fully active, able to carry on all pre-disease performance without restriction     Physical Examination:      Constitutional: Patient is alert and oriented x 3, not in acute distress.  Eyes: Anicteric sclera, pink conjunctiva.  HEENT:  Oropharynx is clear. Neck is supple.  Respiratory: Clear to auscultation and percussion. No rales.  No wheezes.  Cardiovascular: Regular rate and rhythm. No murmurs.  Gastrointestinal: Soft, non tender with good bowel sounds.  Musculoskeletal: No edema. No calf tenderness.  Skin: No suspicious skin lesion, no rash, no ulceration.  Lymphatics: There is no palpable lymphadenopathy throughout in the cervical, supraclavicular, or axillary regions.  Psychiatric: The patient's mood is calm and appropriate for this visit.         Results reviewed at this visit:     Lab Results   Component Value Date    WBC 4.9 01/03/2025    RBC 4.85 01/03/2025    HGB 13.9 01/03/2025    HCT 42.7 01/03/2025    MCV 88.0 01/03/2025    MCH 28.7 01/03/2025    MCHC 32.6 01/03/2025    RDW 11.9 01/03/2025    .0 01/03/2025     Lab Results   Component Value Date      01/03/2025    K 4.4 01/03/2025     01/03/2025    CO2 28.0 01/03/2025    BUN 16 01/03/2025    CREATSERUM 0.72 01/03/2025    GLU 83 01/03/2025    CA 9.7 01/03/2025    ALKPHO 102 01/03/2025    ALT 21 01/03/2025    AST 20 01/03/2025    BILT 0.3 01/03/2025    ALB 4.3 01/03/2025    TP 7.7 01/03/2025       Results  LABS  Thyroid: Normal  Lipids: Normal    RADIOLOGY  Bone Density Scan: Normal    PATHOLOGY  Lumpectomy Pathology: 1.8 cm invasive ductal carcinoma, low estrogen and progesterone receptor, high KI-67, HER2 negative, 3 negative sentinel lymph nodes, 1 with isolated tumor cells, Oncotype recurrence score 69 (04/12/2024)         Assessment & Plan  Left breast cancer in overlapping quadrants (3 o'clock):  Invasive ductal carcinoma, grade III  Lumpectomy on 4/12/2024  1.8 cm IDC  ER 15 %  WY 15%  Ki-67 >90%  Her2 1+  SLN 0/3 (1 node with ITC)  Oncotype Dx RS 69  Single gene studys on Oncotype with ER/WY/Her2 negative  TC x 4 completed in 9/2024    Underwent lumpectomy on April 12, 2024, for a 1.8 cm invasive ductal carcinoma with low estrogen and progesterone receptor levels, high KI-67, HER2 negative, and a high oncotype recurrence score of 69, indicating a triple-negative breast cancer profile. Completed TC chemotherapy in September 2024. Experienced significant side effects from letrozole, including exacerbated menopausal symptoms, joint pain, and mood changes, leading to discontinuation. Oncotype results suggest limited benefit from endocrine therapy, and the decision was made to stop it. Alternative aromatase inhibitors may have similar side effects with marginal benefit.  - Discontinue letrozole.  - Consider anastrozole if she wishes to explore another aromatase inhibitor.  - Schedule follow-up in six months.    Post-treatment fatigue  Experiences fatigue likely related to recent chemotherapy and radiation therapy. Fatigue is expected to improve with increased physical activity and recovery from  treatment.  - Encourage daily walking and gradual increase in physical activity.  - Consider physical therapy to improve strength and reduce fatigue.    Menopausal symptoms  Reports ongoing menopausal symptoms, including hot flashes, insomnia, and fatigue, exacerbated by letrozole. Using melatonin for insomnia and has received lifestyle advice to improve sleep hygiene. Symptoms are expected to improve with time and increased physical activity.  - Continue melatonin for insomnia.  - Encourage regular exercise, including walking and weightlifting, to improve overall well-being and reduce menopausal symptoms.  - Advise on sleep hygiene practices to improve sleep quality.    Joint pain  Experienced joint pain, particularly in the hands, while on letrozole. Pain has improved since discontinuing the medication.  - Consider physical therapy exercises to improve joint function and reduce stiffness.           The following individual(s) verbally consented to be recorded using ambient AI listening technology and understand that they can each withdraw their consent to this listening technology at any point by asking the clinician to turn off or pause the recording:    Patient name: Anna RETA Sinclair MD          [1]   Patient Active Problem List  Diagnosis    Candidal intertrigo    Class 1 obesity due to excess calories without serious comorbidity with body mass index (BMI) of 30.0 to 30.9 in adult    Hot flushes, perimenopausal    Intramural leiomyoma of uterus    Cystocele, midline    Status post hysteroscopic polypectomy    Preop testing    Abnormal uterine bleeding    Menorrhagia with regular cycle    Status post endometrial ablation    Special screening for malignant neoplasm of colon    Benign neoplasm of ascending colon    History of adenomatous polyp of colon    Benign neoplasm of descending colon    Invasive ductal carcinoma of left breast (HCC)    Malignant neoplasm of overlapping sites of left  breast in female, estrogen receptor positive (HCC)   [2]   Past Medical History:   Anesthesia complication    gets shakey    Breast cancer (HCC)    DCIS (ductal carcinoma in situ) of breast    DUB (dysfunctional uterine bleeding)    Headache disorder    sometimes    High cholesterol    Sleep disturbance    some insomnia lately due to menopause    Visual impairment    reading glasses    Wears glasses    only for reading    Weight gain    through menopause   [3]   Past Surgical History:  Procedure Laterality Date    Colonoscopy  5-18-23    Colonoscopy      D & c  04/21/2022    Dilation/curettage,diagnostic      Endometrial ablation      Endometrial biopsy - jar(s): 2  04/21/2022    Hysteroscopy  04/21/2022    Lumpectomy left      [place] teeth removed     [4]   Family History  Problem Relation Age of Onset    Cancer Father 74        prostate with lung mets    Prostate Cancer Father     Hypertension Mother     Other (lung cancer) Paternal Uncle     Other (esophagus cancer) Paternal Cousin    [5]   Allergies  Allergen Reactions    Penicillins ITCHING     As a child   [6]    Calcium Carbonate (CALCIUM 600 OR) Take 600 mg by mouth in the morning.      Multiple Vitamins-Minerals (MULTI-VITAMIN/MINERALS) Oral Tab Take 1 tablet by mouth in the morning.      Cholecalciferol (VITAMIN D) 50 MCG (2000 UT) Oral Cap Take 1 capsule (2,000 Units total) by mouth in the morning.      Nystatin 739190 UNIT/GM External Powder Apply 1 Application topically in the morning.      hydrocortisone 2.5 % External Cream Use on AA QD-BID prn 30 g 2    ketoconazole 2 % External Cream Apply to AA QD-BID prn. 60 g 2

## 2025-05-01 ENCOUNTER — HOSPITAL ENCOUNTER (OUTPATIENT)
Dept: GENERAL RADIOLOGY | Age: 55
Discharge: HOME | End: 2025-05-01
Attending: INTERNAL MEDICINE
Payer: COMMERCIAL

## 2025-05-01 ENCOUNTER — OFFICE VISIT (OUTPATIENT)
Dept: INTERNAL MEDICINE CLINIC | Facility: CLINIC | Age: 55
End: 2025-05-01
Payer: COMMERCIAL

## 2025-05-01 VITALS
WEIGHT: 201.63 LBS | OXYGEN SATURATION: 96 % | SYSTOLIC BLOOD PRESSURE: 140 MMHG | HEART RATE: 80 BPM | BODY MASS INDEX: 33.19 KG/M2 | TEMPERATURE: 98 F | DIASTOLIC BLOOD PRESSURE: 70 MMHG | HEIGHT: 65.16 IN

## 2025-05-01 DIAGNOSIS — M25.572 ACUTE LEFT ANKLE PAIN: ICD-10-CM

## 2025-05-01 DIAGNOSIS — M25.572 ACUTE LEFT ANKLE PAIN: Primary | ICD-10-CM

## 2025-05-01 PROCEDURE — 73610 X-RAY EXAM OF ANKLE: CPT | Performed by: INTERNAL MEDICINE

## 2025-05-01 PROCEDURE — 99214 OFFICE O/P EST MOD 30 MIN: CPT | Performed by: INTERNAL MEDICINE

## 2025-05-01 NOTE — PROGRESS NOTES
Subjective:   Anna Palacios is a 54 year old female who presents for Ankle Pain and Follow - Up   The patient has a history of breast cancer-estrogen receptor positive, status post lumpectomy on April 2024.  She was on letrozole, but because of its endocrine side effects, she had stopped taking it.    For last few weeks the patient has been experiencing left ankle pain.  She has been taking Tylenol but not much help.    Denies fever or chills.    History/Other: Ricardo olivares going obviously no she can think it   Chief Complaint Reviewed and Verified  No Further Nursing Notes to   Review  Tobacco Reviewed  Allergies Reviewed  Medications Reviewed    Medical History Reviewed  Surgical History Reviewed  OB Status Reviewed    Family History Reviewed  Social History Reviewed         Tobacco:  She has never smoked tobacco.    Current Medications[1]      Review of Systems:  Pertinent items are noted in HPI.  A comprehensive review of systems was negative.      Objective:   /70 (BP Location: Left arm, Patient Position: Sitting, Cuff Size: adult)   Pulse 80   Temp 98.1 °F (36.7 °C) (Temporal)   Ht 5' 5.16\" (1.655 m)   Wt 201 lb 9.6 oz (91.4 kg)   LMP 10/01/2022 (Approximate)   SpO2 96%   BMI 33.38 kg/m²  Estimated body mass index is 33.38 kg/m² as calculated from the following:    Height as of this encounter: 5' 5.16\" (1.655 m).    Weight as of this encounter: 201 lb 9.6 oz (91.4 kg).  General condition: Not in distress.    HEENT: Atraumatic normocephalic  No cervical or submandibular lymphadenopathy  Chest: Clear to auscultate  Heart: S1-S2 ,no murmur  Neurological examination: No facial asymmetry.  No lateralizing neurological signs.  Mental state examination: Good eye to eye contact.  Normal mood and behavior.  Engaged in meaningful conversations.  Extremities:  Left ankle swelling.  Decreased range of movement.    Assessment & Plan:     #1: Left ankle pain-cause unclear tendinitis versus gouty  arthritis versus side effect of letrozole. check uric acid levels.  Diclofenac gel 3 times daily as needed.  Ibuprofen 400 mg 3 times daily as needed.  Obtain x-ray of left ankle.  Discussed about stretching exercises of the ankle.    #2: Class I obesity-discussed about lifestyle modifications.        No follow-ups on file.    Amandeep Carr MD, 5/1/2025, 8:54 AM         [1]   Current Outpatient Medications   Medication Sig Dispense Refill    letrozole 2.5 MG Oral Tab Take 1 tablet (2.5 mg total) by mouth daily. (Patient not taking: Reported on 4/10/2025) 90 tablet 3    Calcium Carbonate (CALCIUM 600 OR) Take 600 mg by mouth in the morning.      Multiple Vitamins-Minerals (MULTI-VITAMIN/MINERALS) Oral Tab Take 1 tablet by mouth in the morning.      Cholecalciferol (VITAMIN D) 50 MCG (2000 UT) Oral Cap Take 1 capsule (2,000 Units total) by mouth in the morning.      Nystatin 211056 UNIT/GM External Powder Apply 1 Application topically in the morning.      hydrocortisone 2.5 % External Cream Use on AA QD-BID prn 30 g 2    ketoconazole 2 % External Cream Apply to AA QD-BID prn. 60 g 2

## 2025-05-01 NOTE — PATIENT INSTRUCTIONS
For your ankle pain, please consider taking ibuprofen 400 mg 3 times a day as required for pain.  Take this medicine after the food.  And you may also consider taking omeprazole 20 mg once daily because it can help decrease GI side effects  Please get x-ray of left ankle done  You can also apply diclofenac gel 3 times a day  Please get your blood test done for uric acid levels.    Weight Loss Advice :  A) Eat plant based diet and avoid ultra processed and fast foods.  B) Your portions should be a dinner plate. 1/2 should be vegetables, 1/4 lean protein (chicken, fish, turkey. Tofu), and 1/4 can be carbohydrates.   C)  Your main drink should be water rather than soda or alcohol or sweet coffees  D). Please try to exercise ( brisk walking or jogging) at least 30 minutes five times/week; and do muscle strengthening exercises ( lifting the weight, doing push ups or yoga)  twice a week    E). It is very important to get 7-9 hours of sleep per night

## 2025-05-05 ENCOUNTER — TELEPHONE (OUTPATIENT)
Facility: CLINIC | Age: 55
End: 2025-05-05

## 2025-05-05 ENCOUNTER — LAB ENCOUNTER (OUTPATIENT)
Dept: LAB | Age: 55
End: 2025-05-05
Attending: INTERNAL MEDICINE
Payer: COMMERCIAL

## 2025-05-05 DIAGNOSIS — M25.572 ACUTE LEFT ANKLE PAIN: ICD-10-CM

## 2025-05-05 LAB
URATE SERPL-MCNC: 5.3 MG/DL (ref 3.1–7.8)
URATE UR-MCNC: 52.3 MG/DL

## 2025-05-05 PROCEDURE — 87591 N.GONORRHOEAE DNA AMP PROB: CPT

## 2025-05-05 PROCEDURE — 87491 CHLMYD TRACH DNA AMP PROBE: CPT

## 2025-05-05 PROCEDURE — 36415 COLL VENOUS BLD VENIPUNCTURE: CPT

## 2025-05-05 PROCEDURE — 84560 ASSAY OF URINE/URIC ACID: CPT

## 2025-05-05 PROCEDURE — 87801 DETECT AGNT MULT DNA AMPLI: CPT

## 2025-05-05 PROCEDURE — 84550 ASSAY OF BLOOD/URIC ACID: CPT

## 2025-05-05 NOTE — TELEPHONE ENCOUNTER
Patient scheduled for left ankle pain via Mychart.    Future Appointments   Date Time Provider Department Center   5/13/2025  2:20 PM Britney Pineda, MICHAEL [facility]     Please advise if imaging is needed.

## 2025-05-06 LAB
C TRACH DNA SPEC QL NAA+PROBE: NEGATIVE
N GONORRHOEA DNA SPEC QL NAA+PROBE: NEGATIVE

## 2025-05-13 ENCOUNTER — OFFICE VISIT (OUTPATIENT)
Dept: ORTHOPEDICS CLINIC | Facility: CLINIC | Age: 55
End: 2025-05-13
Payer: COMMERCIAL

## 2025-05-13 DIAGNOSIS — M76.822 POSTERIOR TIBIAL TENDINITIS, LEFT: Primary | ICD-10-CM

## 2025-05-13 PROCEDURE — 99204 OFFICE O/P NEW MOD 45 MIN: CPT | Performed by: PODIATRIST

## 2025-05-13 NOTE — PROGRESS NOTES
[facility] New Patient Note    CC:   Chief Complaint   Patient presents with    Ankle Pain     LT ANKLE PAIN, APPROX. ONSET: 04.05.25  Pt denies numb/ting/burning       HPI: The patient is a 54 year old female who presents today with complaints of ankle pain  Some heel pain    Using voltaren gel     Went on walk - long about 5 weeks ago  She enjoys walking for exercise          Past Medical History[1]  Past Surgical History[2]  Current Medications[3]  Allergies[4]  Family History[5]  Social History     Occupational History    Occupation: worked as a teacher - on leave   Tobacco Use    Smoking status: Never     Passive exposure: Never    Smokeless tobacco: Never   Vaping Use    Vaping status: Never Used   Substance and Sexual Activity    Alcohol use: Never    Drug use: Never    Sexual activity: Not Currently     Partners: Male        ROS:  Complete ROS reviewed by me and non-contributory to the chief complaint except as mentioned above.    Physical Exam:    LMP 10/01/2022 (Approximate)   Upon stance neutral arch right with a little collapse on the left and ankle valgus  Tender along the course of the posterior tibial tendon left ankle  No crepitus.  Mild swelling.  Swelling medial and lateral ankle.  No heel pain today.  Tender with testing of PT tendon    Sensation is intact sharp versus dull.  She can feel light touch to the tips of the toes  Palpable pedal pulses.  Hair growth is present.  Skin is supple and feet are well perfused and warm.    Strength is 5 out of 5 all muscle groups    Full range of motion and nonpainful motion of the ankle joint, subtalar joint, MP joints, and midfoot joints.       Imaging:  x rays left ankle  Mortise is clear  Swelling about ankle   personally viewed, independently interpreted and radiology report read.      Assessment/Diagnoses:  Diagnoses and all orders for this visit:    Posterior tibial tendinitis, left  -     Physical Therapy Referral - [facility]  Location        Plan:  I reviewed imaging and exam findings with the patient.    She has posterior tibial tendonitis  Discussed what this is and how it occurs.  She has a little loss of arch height and ankle valgus.  At risk of PT tendon tear.  We offered her a low-profile boot to limit ankle motion and help with pain and swelling    Rx for PT given for modalities, ankle rehab, taping etc.    Ice, topicals, nsaids    Use of ankle sleeve and good shoes  Where to go for running shoe  High tops or hiking style as well    If not improving, will go ahead with MRI of ankle    Follow up after PT    Britney Pineda, DPMPodiatric Surgery  FACFAS  [facility]  [address], [address], [place] [address]   [address] [place] [address] [email or web address]  [email or web address]  t: [phone]   f: [phone]              This document was partially prepared using Dragon Medical voice recognition software.            [1]   Past Medical History:   Anesthesia complication    gets shakey    Breast cancer (HCC)    DCIS (ductal carcinoma in situ) of breast    DUB (dysfunctional uterine bleeding)    Headache disorder    sometimes    High cholesterol    Sleep disturbance    some insomnia lately due to menopause    Visual impairment    reading glasses    Wears glasses    only for reading    Weight gain    through menopause   [2]   Past Surgical History:  Procedure Laterality Date    Colonoscopy  5-18-23    Colonoscopy      D & c  04/21/2022    Dilation/curettage,diagnostic      Endometrial ablation      Endometrial biopsy - jar(s): 2  04/21/2022    Hysteroscopy  04/21/2022    Lumpectomy left      [place] teeth removed     [3]   Current Outpatient Medications   Medication Sig Dispense Refill    letrozole 2.5 MG Oral Tab Take 1 tablet (2.5 mg total) by mouth daily. (Patient not taking: Reported on 4/10/2025) 90 tablet 3    Calcium Carbonate (CALCIUM 600 OR) Take 600 mg by mouth in the morning.       Multiple Vitamins-Minerals (MULTI-VITAMIN/MINERALS) Oral Tab Take 1 tablet by mouth in the morning.      Cholecalciferol (VITAMIN D) 50 MCG (2000 UT) Oral Cap Take 1 capsule (2,000 Units total) by mouth in the morning.      Nystatin [id number] UNIT/GM External Powder Apply 1 Application topically in the morning.      hydrocortisone 2.5 % External Cream Use on AA QD-BID prn 30 g 2    ketoconazole 2 % External Cream Apply to AA QD-BID prn. 60 g 2   [4]   Allergies  Allergen Reactions    Penicillins ITCHING     As a child   [5]   Family History  Problem Relation Age of Onset    Cancer Father 74        prostate with lung mets    Prostate Cancer Father     Hypertension Mother     Other (lung cancer) Paternal Uncle     Other (esophagus cancer) Paternal Cousin

## 2025-05-27 ENCOUNTER — HOSPITAL ENCOUNTER (OUTPATIENT)
Dept: MAMMOGRAPHY | Age: 55
Discharge: HOME | End: 2025-05-27
Attending: SURGERY
Payer: COMMERCIAL

## 2025-05-27 DIAGNOSIS — C50.912 INVASIVE DUCTAL CARCINOMA OF LEFT BREAST (HCC): ICD-10-CM

## 2025-05-27 PROCEDURE — 77062 BREAST TOMOSYNTHESIS BI: CPT | Performed by: SURGERY

## 2025-05-27 PROCEDURE — 77066 DX MAMMO INCL CAD BI: CPT | Performed by: SURGERY

## 2025-06-11 ENCOUNTER — TELEPHONE (OUTPATIENT)
Dept: PHYSICAL THERAPY | Facility: HOSPITAL | Age: 55
End: 2025-06-11

## 2025-06-12 ENCOUNTER — OFFICE VISIT (OUTPATIENT)
Dept: PHYSICAL THERAPY | Age: 55
End: 2025-06-12
Attending: PODIATRIST
Payer: COMMERCIAL

## 2025-06-12 DIAGNOSIS — M76.822 POSTERIOR TIBIAL TENDINITIS, LEFT: Primary | ICD-10-CM

## 2025-06-12 PROCEDURE — 97161 PT EVAL LOW COMPLEX 20 MIN: CPT

## 2025-06-12 PROCEDURE — 97110 THERAPEUTIC EXERCISES: CPT

## 2025-06-12 NOTE — PROGRESS NOTES
LOWER EXTREMITY EVALUATION:     Diagnosis:   Posterior tibial tendinitis, left (M76.822) Patient:  Anna Palacios (54 year old, female)        Referring Provider: Britney Pineda  Today's Date   6/12/2025    Precautions:      Date of Evaluation: 06/12/25  Next MD visit: No data recorded  Date of Surgery: No data recorded     PATIENT SUMMARY     Summary of chief complaints: left medial ankle pain, sometimes tight on medial lower leg, now intermittent vs earlier when it was constant  History of current condition: onset early April with walking; pain at night and in a.m. with stepping on it   Pain level: current  , at best 2 /10, at worst 3 /10 (has decreased somewhat; increases with activity)  Description of symptoms: tight, tingly; previously sharp pain   Occupation: teacher, currently not working   Leisure activities/Hobbies: walking 1-1.5 mi;   Prior level of function: independent  Current limitations: up stairs (step to pattern d/t pain); decreased tolerance to walking for exercise, 1-1.5 mi  Pt goals: return to physical activity for weight loss  Past medical history was reviewed with Anna.  Significant findings include: h/o plantar fascitis 2 yr ago; h/o breast CA, finished chemo/radiation in fall 2024  Imaging/Tests: FINDINGS:  No acute osseous injuries.  Mild nonspecific soft tissue swelling noted diffusely.  No evidence of significant joint effusion.  There is moderate calcaneal enthesopathy involving both the Achilles and plantar fascial insertions.  No   significant osteoarthritis noted.   Anna  has a past medical history of Anesthesia complication, Breast cancer (HCC) (05/2024), DCIS (ductal carcinoma in situ) of breast, DUB (dysfunctional uterine bleeding), Headache disorder, High cholesterol, Sleep disturbance, Visual impairment, Wears glasses, and Weight gain (last few years).  She  has a past surgical history that includes wisdom teeth removed; hysteroscopy (04/21/2022); d & c (04/21/2022);  endometrial biopsy - jar(s): 2 (04/21/2022); colonoscopy (5-18-23); dilation/curettage,diagnostic; endometrial ablation; colonoscopy; lumpectomy left (Left, 05/2024); chemotherapy; and radiation left.    ASSESSMENT  Anna presents to physical therapy evaluation with primary c/o left medial ankle pain, sometimes tight on medial lower leg, now intermittent vs earlier when it was constant. The results of the objective tests and measures show decreased LE/ankle strength, soft tissue restrictions, impaired balance, decreased pt ed for HEP. Functional deficits include but are not limited to up stairs (step to pattern d/t pain); decreased tolerance to walking for exercise, 1-1.5 mi. Signs and symptoms are consistent with diagnosis of Posterior tibial tendinitis, left (M76.822). Pt and PT discussed evaluation findings, pathology, POC and HEP.  Pt voiced understanding and performs HEP correctly without reported pain. Skilled Physical Therapy is medically necessary to address the above impairments and reach functional goals.    OBJECTIVE:      Musculoskeletal  Observation: no obvious STJ pronation or flattened arch B  Palpation: mod tenderness to L posterior tibialis, posterior to medial malleolus; minimal to more proximal muscle belly; no TTP to Achilles or plantar foot  Accessory Motion:       Edema: 29.5 cm L malleolar level, 28 cm R   Special Tests:      ROM and Strength: (* denotes performed with pain)  Hip   MMT (-/5)    R L     Flex (L2)         Ext  4- 4     Abd NT 4+ in supine (pt not tolerating lying on R side due to R lateralhip pain)     ER         IR        ,   Knee   ROM MMT (-/5)    R L R L     Flex     5 5     Ext (L3)     5 5     ,   Ankle/Foot   ROM MMT (-/5)    R L R L     PF   45 5 (single leg heelraise x1, no pain) 5 (supine) (single leg heelraise x1 on L with pain)     DF (L4)   8 5 5     Inversion   35 5 4+     Eversion   25 5 4+     Grt Toe Ext (L5)               Flexibility:    LE Flexibility R L      Hip Flexor         Hamstrings mod restricted mod restricted     ITB mod restricted mod restricted     Piriformis         Quads mod restricted mod restricted     Gastroc-soleus             Balance and Functional Mobility:  Gait: pt ambulates on level ground with -- (mild L STJ pronation)   Tandem Stance: R back:  ; L back:    SLS: R 10 sec,  L 5 sec (instability, pt cautious to bear weight due to pain)       Today's Treatment and Response:   Pt education was provided on exam findings, treatment diagnosis, treatment plan, expectations, and prognosis.    Today's Treatment       6/12/2025   LE Treatment   Therapeutic Exercise Demo and verbal ed in gastroc and soleus stretches, 3x30\", 3x/day   Therapeutic Exercise Minutes 10   Evaluation Minutes 30   Total Time Of Timed Procedures 10   Total Time Of Service-Based Procedures 30   Total Treatment Time 40          Charges:  PT EVAL: Low Complexity,    In agreement with evaluation findings and clinical rationale, this evaluation involved LOW COMPLEXITY decision making due to no personal factors/comorbidities, 1-2 body structures involved/activity limitations, and stable symptoms as documented in the evaluation.                                                                                                                PLAN OF CARE:      Goals: (to be met in 10 visits)    Not Met Progress Toward Partially Met Met   Pt will demonstrate improved DF AROM to >= 10 degrees to promote proper foot clearance during gait and greater ease descending stairs without compensation. [] [] [] []   Pt will have increased ankle strength to 5/5 throughout for improved ankle control with ADLs such as prolonged gait and stair negotiation. [] [] [] []   Pt will have improved SLS to >15s for increased ankle stability with ambulation on uneven surfaces such as gravel and grass. [] [] [] []   Pt will report <2/10 pain with home/recreational activities such as walking, stairs. [] [] [] []   Pt will  be independent and compliant with comprehensive HEP to maintain progress achieved in PT. [] [] [] []   Pt to be independent in use of proper footwear and arch support for improved tolerance to standing/walking.            Frequency / Duration: Patient will be seen 2x/week or a total of 10  visits over a 90 day period. Treatment will include: Gait training; Manual Therapy; Neuromuscular Re-education; Therapeutic Exercise; Patient/Family Education; Home Exercise Program instruction    Education or treatment limitation: None   Rehab Potential: good     LEFS Score  LEFS Score: (Patient-Rptd) 63.75 % (6/11/2025  9:40 PM)      Patient/Family/Caregiver was advised of these findings, precautions, and treatment options and has agreed to actively participate in planning and for this course of care.    Thank you for your referral. Please co-sign or sign and return this letter via fax as soon as possible to [phone]. If you have any questions, please contact me at Dept: [phone]    Sincerely,  Electronically signed by therapist: Linda Pinzon PT  Physician's certification required: Yes  I certify the need for these services furnished under this plan of treatment and while under my care.    X___________________________________________________ Date____________________    Certification From: 6/12/2025  To: 9/10/2025

## 2025-06-18 ENCOUNTER — OFFICE VISIT (OUTPATIENT)
Dept: PHYSICAL THERAPY | Age: 55
End: 2025-06-18
Attending: PODIATRIST
Payer: COMMERCIAL

## 2025-06-18 PROCEDURE — 97110 THERAPEUTIC EXERCISES: CPT

## 2025-06-18 PROCEDURE — 97140 MANUAL THERAPY 1/> REGIONS: CPT

## 2025-06-18 NOTE — PROGRESS NOTES
Patient: Anna Palacios (54 year old, female) Referring Provider:  Insurance:   Diagnosis: Posterior tibial tendinitis, left (M76.932) Britney Pineda  [employer]   Date of Surgery: No data recorded Next MD visit:  N/A   Precautions:    No data recorded Referral Information:    Date of Evaluation: Req: 0, Auth: 0, Exp:     06/12/25 POC Auth Visits:          Today's Date   6/18/2025    Subjective  Pt reports decreased tolerance to soleus stretch (knee bent) due to heel/foot pain.  Pt arrives with new orthotic (in package) that she purchased OTC.       Pain: 0/10            Assessment  Pt with good tolerance to initial session, with improving tolerance to stretches, tighter on soleus than gastroc.  Pt was open to Ktape and applied orthotics today for trial. Pt with moderate tenderness to posterior tib along distal portion and insertion.    Goals (to be met in 10 visits)      Not Met Progress Toward Partially Met Met   Pt will demonstrate improved DF AROM to >= 10 degrees to promote proper foot clearance during gait and greater ease descending stairs without compensation. [] [] [] []   Pt will have increased ankle strength to 5/5 throughout for improved ankle control with ADLs such as prolonged gait and stair negotiation. [] [] [] []   Pt will have improved SLS to >15s for increased ankle stability with ambulation on uneven surfaces such as gravel and grass. [] [] [] []   Pt will report <2/10 pain with home/recreational activities such as walking, stairs. [] [] [] []   Pt will be independent and compliant with comprehensive HEP to maintain progress achieved in PT. [] [] [] []   Pt to be independent in use of proper footwear and arch support for improved tolerance to standing/walking.                Plan  continue PT per POC and progress as tolerated.    Treatment Last 4 Visits  Treatment Day: 2       6/12/2025 6/18/2025   LE Treatment   Therapeutic Exercise Demo and verbal ed in gastroc and soleus  stretches, 3x30\", 3x/day Nustep x7' L4     Gastroc and soleus stretches on incline 2x30\" ea, at wall 1x30\" each    Heel and toe raises on floor 2x10 ea         Neuro Re-Education  --   Modalities  Kinesiotape to L posterior tibialis- inhibition technic for pain relief    Additional Treatment  Pt provided guidance for trial of resuming walking outdoors; also pt applied orthotics to shoes today during session and reported good feel.  Pt to trial in coming days for benefit.  Pt had multiple questions re: stretching and intended purpose/benefit, which were addressed during today's session.   Therapeutic Exercise Minutes 10 45   Manual Therapy Minutes  10   Evaluation Minutes 30    Total Time Of Timed Procedures 10 55   Total Time Of Service-Based Procedures 30 0   Total Treatment Time 40 55        HEP       Charges   Therex 3  Manual 1

## 2025-06-20 ENCOUNTER — OFFICE VISIT (OUTPATIENT)
Dept: PHYSICAL THERAPY | Age: 55
End: 2025-06-20
Attending: PODIATRIST
Payer: COMMERCIAL

## 2025-06-20 PROCEDURE — 97112 NEUROMUSCULAR REEDUCATION: CPT

## 2025-06-20 PROCEDURE — 97110 THERAPEUTIC EXERCISES: CPT

## 2025-06-20 NOTE — PROGRESS NOTES
Patient: Anna Palacios (54 year old, female) Referring Provider:  Insurance:   Diagnosis: Posterior tibial tendinitis, left (M76.120) Britney Pineda  [employer]   Date of Surgery: No data recorded Next MD visit:  N/A   Precautions:    No data recorded Referral Information:    Date of Evaluation: Req: 0, Auth: 0, Exp:     06/12/25 POC Auth Visits:          Today's Date   6/20/2025    Subjective  Pt reports tape stayed in place, 'I don't really feel it at all, it's very gentle'.  states she may take original shoe insert out to accommodate orthotic, as her foot feels high in her shoe. Pt states she will go for a walk later today to trial orthotics.       Pain: 2/10          Assessment  Pt is progressing in foot/ankle strength and balance ex's with mild discomfort L medial aspect at posterior tibialis.  Pt tolerating orthoticwell and removed shoe insert today to provide more space for shoe fit.  Pt tolerating Ktape well also, and will keep x2 more days.  Pt progressing in HEP also.    Goals (to be met in 10 visits)      Not Met Progress Toward Partially Met Met   Pt will demonstrate improved DF AROM to >= 10 degrees to promote proper foot clearance during gait and greater ease descending stairs without compensation. [] [] [] []   Pt will have increased ankle strength to 5/5 throughout for improved ankle control with ADLs such as prolonged gait and stair negotiation. [] [] [] []   Pt will have improved SLS to >15s for increased ankle stability with ambulation on uneven surfaces such as gravel and grass. [] [] [] []   Pt will report <2/10 pain with home/recreational activities such as walking, stairs. [] [] [] []   Pt will be independent and compliant with comprehensive HEP to maintain progress achieved in PT. [] [] [] []   Pt to be independent in use of proper footwear and arch support for improved tolerance to standing/walking.                    Plan  continue PT per POC and progress as tolerated.   Incorporate STM next visit for decreased tissue restrictions and pain relief.    Treatment Last 4 Visits  Treatment Day: 3       6/12/2025 6/18/2025 6/20/2025   LE Treatment   Therapeutic Exercise Demo and verbal ed in gastroc and soleus stretches, 3x30\", 3x/day Nustep x7' L4     Gastroc and soleus stretches on incline 2x30\" ea, at wall 1x30\" each    Heel and toe raises on floor 2x10 ea       Nustep x5' L4     Gastroc and soleus stretches on incline 2x30\" ea  Heel and toe raises on floor x10 ea (feels pull in toe raises)  SLS 2x30\" B w/o Ue's; slight instability on L  Rockerboard AP and lateral sway x60\" each, static stance x30\"  Seated towel scoot x8 laps  Long sitting L ankle inversion/eversion w/yellow TB x20 each (issued TB for HEP)           Neuro Re-Education  --    Modalities  Kinesiotape to L posterior tibialis- inhibition technic for pain relief     Additional Treatment  Pt provided guidance for trial of resuming walking outdoors; also pt applied orthotics to shoes today during session and reported good feel.  Pt to trial in coming days for benefit.  Pt had multiple questions re: stretching and intended purpose/benefit, which were addressed during today's session.    Therapeutic Exercise Minutes 10 45 33   Neuro Re-Educ Minutes   10   Manual Therapy Minutes  10    Evaluation Minutes 30     Total Time Of Timed Procedures 10 55 43   Total Time Of Service-Based Procedures 30 0 0   Total Treatment Time 40 55 43   HEP   Access Code: [email or web address]  URL: [email or web address]  Date: 06/20/2025  Prepared by: Linda Pinzon    Exercises  - Gastroc Stretch on Wall  - 1 x daily - 7 x weekly - 3 reps - 30 hold  - Soleus Stretch on Wall  - 1 x daily - 7 x weekly - 3 reps - 30 hold  - Single Leg Stance  - 1 x daily - 7 x weekly - 2-3 reps - 30 hold  - Ankle Inversion Eversion Towel Slide  - 1 x daily - 7 x weekly - 3 sets - 10 reps  - Ankle Inversion with Resistance  - 1 x daily - 7 x weekly - 2 sets - 10  reps  - Ankle Eversion with Resistance  - 1 x daily - 7 x weekly - 2 sets - 10 reps        HEP  Access Code: [email or web address]  URL: [email or web address]  Date: 06/20/2025  Prepared by: Linda Pinzon    Exercises  - Gastroc Stretch on Wall  - 1 x daily - 7 x weekly - 3 reps - 30 hold  - Soleus Stretch on Wall  - 1 x daily - 7 x weekly - 3 reps - 30 hold  - Single Leg Stance  - 1 x daily - 7 x weekly - 2-3 reps - 30 hold  - Ankle Inversion Eversion Towel Slide  - 1 x daily - 7 x weekly - 3 sets - 10 reps  - Ankle Inversion with Resistance  - 1 x daily - 7 x weekly - 2 sets - 10 reps  - Ankle Eversion with Resistance  - 1 x daily - 7 x weekly - 2 sets - 10 reps    Charges  Therex 2  NM 1

## 2025-06-25 ENCOUNTER — OFFICE VISIT (OUTPATIENT)
Dept: PHYSICAL THERAPY | Age: 55
End: 2025-06-25
Attending: PODIATRIST
Payer: COMMERCIAL

## 2025-06-25 PROCEDURE — 97110 THERAPEUTIC EXERCISES: CPT

## 2025-06-25 NOTE — PROGRESS NOTES
Patient: Anna Palacios (54 year old, female) Referring Provider:  Insurance:   Diagnosis: Posterior tibial tendinitis, left (M76.903) Britney Pineda  [employer]   Date of Surgery: No data recorded Next MD visit:  N/A   Precautions:    No data recorded Referral Information:    Date of Evaluation: Req: 0, Auth: 0, Exp:     06/12/25 POC Auth Visits:          Today's Date   6/25/2025    Subjective  Pt reports mild pain this morning; states she has been bicycling vs walking due to hot weather.  Pt states she notes some dorsal foot pain at T/MT joint region with AROM DF.  States she is still hesitant to descend stairs in reciprocal pattern due to fear of increased pain.       Pain: 2/10     Objective  mild dorsal T/MT pain with PROM MT DF; no pain with MT mobility; no obvious findings in xray contributing to OA in this area              Assessment  Pt may be having increased MT pain due to increased activity w/balane and strength ex's.  Pt tolerating progression well otherwise, with decreasing pain at posterior tib.  Pt will be appropriate to progress to step ups and upgraded balance/stabilization ex's as per pt tolerance.    Goals (to be met in 10 visits)      Not Met Progress Toward Partially Met Met   Pt will demonstrate improved DF AROM to >= 10 degrees to promote proper foot clearance during gait and greater ease descending stairs without compensation. [] [] [] []   Pt will have increased ankle strength to 5/5 throughout for improved ankle control with ADLs such as prolonged gait and stair negotiation. [] [] [] []   Pt will have improved SLS to >15s for increased ankle stability with ambulation on uneven surfaces such as gravel and grass. [] [] [] []   Pt will report <2/10 pain with home/recreational activities such as walking, stairs. [] [] [] []   Pt will be independent and compliant with comprehensive HEP to maintain progress achieved in PT. [] [] [] []   Pt to be independent in use of proper  footwear and arch support for improved tolerance to standing/walking.                        Plan  continue PT per POC and progress as tolerated.    Treatment Last 4 Visits  Treatment Day: 4       6/12/2025 6/18/2025 6/20/2025 6/25/2025   LE Treatment   Therapeutic Exercise Demo and verbal ed in gastroc and soleus stretches, 3x30\", 3x/day Nustep x7' L4     Gastroc and soleus stretches on incline 2x30\" ea, at wall 1x30\" each    Heel and toe raises on floor 2x10 ea       Nustep x5' L4     Gastroc and soleus stretches on incline 2x30\" ea  Heel and toe raises on floor x10 ea (feels pull in toe raises)  SLS 2x30\" B w/o Ue's; slight instability on L  Rockerboard AP and lateral sway x60\" each, static stance x30\"  Seated towel scoot x8 laps  Long sitting L ankle inversion/eversion w/yellow TB x20 each (issued TB for HEP)         Nustep x5' L4     Gastroc and soleus stretches on incline 2x30\" ea   Heel and toe raises from airex  2x10 ea     Seated towel scoot x5 laps   Long sitting L ankle inversion/eversion w/yellow TB x20 each -NP  Seated intrinsic strength - raising arch 5\"x10  Step ups (next time 4\")       Neuro Re-Education  --  SLS 2x30\" B w/o Ue's; cues for neutral   Rockerboard AP and lateral sway x60\" each, static stance x60\"  [employer] walking (next time)   Manual Therapy    STM x5' to L posterior tibialis, mild tenderness to distal m sandor and tendon   Modalities  Kinesiotape to L posterior tibialis- inhibition technic for pain relief      Additional Treatment  Pt provided guidance for trial of resuming walking outdoors; also pt applied orthotics to shoes today during session and reported good feel.  Pt to trial in coming days for benefit.  Pt had multiple questions re: stretching and intended purpose/benefit, which were addressed during today's session.     Therapeutic Exercise Minutes 10 45 33 43   Neuro Re-Educ Minutes   10    Manual Therapy Minutes  10     Evaluation Minutes 30      Total Time Of Timed Procedures  10 55 43 43   Total Time Of Service-Based Procedures 30 0 0 0   Total Treatment Time 40 55 43 43   HEP   Access Code: [email or web address]  URL: [email or web address]  Date: 06/20/2025  Prepared by: Linda Pinzon    Exercises  - Gastroc Stretch on Wall  - 1 x daily - 7 x weekly - 3 reps - 30 hold  - Soleus Stretch on Wall  - 1 x daily - 7 x weekly - 3 reps - 30 hold  - Single Leg Stance  - 1 x daily - 7 x weekly - 2-3 reps - 30 hold  - Ankle Inversion Eversion Towel Slide  - 1 x daily - 7 x weekly - 3 sets - 10 reps  - Ankle Inversion with Resistance  - 1 x daily - 7 x weekly - 2 sets - 10 reps  - Ankle Eversion with Resistance  - 1 x daily - 7 x weekly - 2 sets - 10 reps         HEP  Access Code: [email or web address]  URL: [email or web address]  Date: 06/20/2025  Prepared by: Linda Pinzon    Exercises  - Gastroc Stretch on Wall  - 1 x daily - 7 x weekly - 3 reps - 30 hold  - Soleus Stretch on Wall  - 1 x daily - 7 x weekly - 3 reps - 30 hold  - Single Leg Stance  - 1 x daily - 7 x weekly - 2-3 reps - 30 hold  - Ankle Inversion Eversion Towel Slide  - 1 x daily - 7 x weekly - 3 sets - 10 reps  - Ankle Inversion with Resistance  - 1 x daily - 7 x weekly - 2 sets - 10 reps  - Ankle Eversion with Resistance  - 1 x daily - 7 x weekly - 2 sets - 10 reps    Charges   Therex 3

## 2025-06-27 ENCOUNTER — OFFICE VISIT (OUTPATIENT)
Dept: PHYSICAL THERAPY | Age: 55
End: 2025-06-27
Attending: PODIATRIST
Payer: COMMERCIAL

## 2025-06-27 PROCEDURE — 97110 THERAPEUTIC EXERCISES: CPT

## 2025-06-27 NOTE — PROGRESS NOTES
Patient: Anna Palacios (54 year old, female) Referring Provider:  Insurance:   Diagnosis: Posterior tibial tendinitis, left (M76.492) Britney Pineda  [employer]   Date of Surgery: No data recorded Next MD visit:  N/A   Precautions:    No data recorded Referral Information:    Date of Evaluation: Req: 0, Auth: 0, Exp:     06/12/25 POC Auth Visits:          Today's Date   6/27/2025    Subjective  Pt reports no pain start of session; continues to feel some dorsal foot pain across front.  states she notes feeling gap in front of shoe   with original insert taken out for orthotic.  she might go to running [employer] this weekend and try [employer]ok[employer].  Pt reports overall feeling much better than at onset of pain.  states she is still 'hesitant to put a lot of pressure on the foot in case the muscle flares up again'.       Pain: 1/10            Assessment  Pt tolerating gradual progression well, although continues to be cautious with more challenged WB ex's; pt was encouraged to trial walking again as able to monitor tolerance for return to regular exercise.  pt with decreasing TTP to posterior tibiais, but displays some soreness in anterior foot musculature, likely due to increased activity/focused ex's.    Goals (to be met in 10 visits)      Not Met Progress Toward Partially Met Met   Pt will demonstrate improved DF AROM to >= 10 degrees to promote proper foot clearance during gait and greater ease descending stairs without compensation. [] [] [] []   Pt will have increased ankle strength to 5/5 throughout for improved ankle control with ADLs such as prolonged gait and stair negotiation. [] [] [] []   Pt will have improved SLS to >15s for increased ankle stability with ambulation on uneven surfaces such as gravel and grass. [] [] [] []   Pt will report <2/10 pain with home/recreational activities such as walking, stairs. [] [] [] []   Pt will be independent and compliant with comprehensive HEP to maintain progress  achieved in PT. [] [] [] []   Pt to be independent in use of proper footwear and arch support for improved tolerance to standing/walking.                            Plan  continue PT per POC    Treatment Last 4 Visits  Treatment Day: 5       6/18/2025 6/20/2025 6/25/2025 6/27/2025   LE Treatment   Therapeutic Exercise Nustep x7' L4     Gastroc and soleus stretches on incline 2x30\" ea, at wall 1x30\" each    Heel and toe raises on floor 2x10 ea       Nustep x5' L4     Gastroc and soleus stretches on incline 2x30\" ea  Heel and toe raises on floor x10 ea (feels pull in toe raises)  SLS 2x30\" B w/o Ue's; slight instability on L  Rockerboard AP and lateral sway x60\" each, static stance x30\"  Seated towel scoot x8 laps  Long sitting L ankle inversion/eversion w/yellow TB x20 each (issued TB for HEP)         Nustep x5' L4     Gastroc and soleus stretches on incline 2x30\" ea   Heel and toe raises from airex  2x10 ea     Seated towel scoot x5 laps   Long sitting L ankle inversion/eversion w/yellow TB x20 each -NP  Seated intrinsic strength - raising arch 5\"x10  Step ups (next time 4\")     Treadmill 1.4 mph x5' for warm up    Gastroc and soleus stretches on incline 3x30\" ea -feels L dorsal discomfort 1-2/10  Heelraises w/controlled lowering - x10 B     Long sitting L ankle inversion/eversion w/yellow TB x10 each, review of technic/position  Seated intrinsic strength - raising arch 5\"x10 -NP  Step ups fwd 6\" - Lx10, some pressure under heel, not bad  Step down w/L x10         Neuro Re-Education --  SLS 2x30\" B w/o Ue's; cues for neutral   Rockerboard AP and lateral sway x60\" each, static stance x60\"  Aeromat walking (next time) SLS x30\" floor; 2x30\" on airex (increased L dorsal foot pain)  Rockerboard AP 2x60\"  Aeromat walking - lateral and fwd/retro 4 laps ea       Manual Therapy   STM x5' to L posterior tibialis, mild tenderness to distal m sandor and tendon   STM x5' to L posterior tibialis, mild tenderness to distal m sandor  and tendon    Passive stretch of toe flexors and anterior tibialis; pt likely having muscle soreness from increased activity/ex's and encouraged to perform self stretches.     Modalities Kinesiotape to L posterior tibialis- inhibition technic for pain relief       Additional Treatment Pt provided guidance for trial of resuming walking outdoors; also pt applied orthotics to shoes today during session and reported good feel.  Pt to trial in coming days for benefit.  Pt had multiple questions re: stretching and intended purpose/benefit, which were addressed during today's session.      Therapeutic Exercise Minutes 45 33 43 45   Neuro Re-Educ Minutes  10     Manual Therapy Minutes 10      Total Time Of Timed Procedures 55 43 43 45   Total Time Of Service-Based Procedures 0 0 0 0   Total Treatment Time 55 43 43 45   HEP  Access Code: [email or web address]  URL: [email or web address]/  Date: 06/20/2025  Prepared by: Linda Pinzon    Exercises  - Gastroc Stretch on Wall  - 1 x daily - 7 x weekly - 3 reps - 30 hold  - Soleus Stretch on Wall  - 1 x daily - 7 x weekly - 3 reps - 30 hold  - Single Leg Stance  - 1 x daily - 7 x weekly - 2-3 reps - 30 hold  - Ankle Inversion Eversion Towel Slide  - 1 x daily - 7 x weekly - 3 sets - 10 reps  - Ankle Inversion with Resistance  - 1 x daily - 7 x weekly - 2 sets - 10 reps  - Ankle Eversion with Resistance  - 1 x daily - 7 x weekly - 2 sets - 10 reps          HEP  Access Code: [email or web address]  URL: [email or web address]/  Date: 06/20/2025  Prepared by: Linda Pinzon    Exercises  - Gastroc Stretch on Wall  - 1 x daily - 7 x weekly - 3 reps - 30 hold  - Soleus Stretch on Wall  - 1 x daily - 7 x weekly - 3 reps - 30 hold  - Single Leg Stance  - 1 x daily - 7 x weekly - 2-3 reps - 30 hold  - Ankle Inversion Eversion Towel Slide  - 1 x daily - 7 x weekly - 3 sets - 10 reps  - Ankle Inversion with Resistance  - 1 x daily - 7 x weekly - 2 sets - 10 reps  - Ankle Eversion with  Resistance  - 1 x daily - 7 x weekly - 2 sets - 10 reps    Charges   Therex 3

## 2025-07-02 ENCOUNTER — OFFICE VISIT (OUTPATIENT)
Dept: PHYSICAL THERAPY | Age: 55
End: 2025-07-02
Attending: PODIATRIST
Payer: COMMERCIAL

## 2025-07-02 PROCEDURE — 97110 THERAPEUTIC EXERCISES: CPT

## 2025-07-02 NOTE — PROGRESS NOTES
Patient: Anna Palacios (54 year old, female) Referring Provider:  Insurance:   Diagnosis: Posterior tibial tendinitis, left (M76.286) Britney Pineda  [employer]   Date of Surgery: No data recorded Next MD visit:  N/A   Precautions:    No data recorded Referral Information:    Date of Evaluation: Req: 0, Auth: 0, Exp:     06/12/25 POC Auth Visits:          Today's Date   7/2/2025    Subjective  Pt with no specific pain today; states she stepped off her bike a little hard and had a little soreness in L foot.  reports vague dorsal and medial L ankle pain, 'not much'.  wearing regular inserts in shoe today, not orthotics, for benefit of full foot coverage.  feels it may relate to her dorsal foot tightness/soreness.  Pt reports continued mild pain with prolonged walking; has not yet returned to normal duration of walk due to fear of increased symptoms.        Pain: 1/10          Assessment  pt with gradual improving tolerance to CKC/WB ex's to promote improved intrinsic strength, balance and functional stability for steps and single leg activity.  MIld tenderness remains to L medial foot.    Goals (to be met in 10 visits)      Not Met Progress Toward Partially Met Met   Pt will demonstrate improved DF AROM to >= 10 degrees to promote proper foot clearance during gait and greater ease descending stairs without compensation. [] [] [] []   Pt will have increased ankle strength to 5/5 throughout for improved ankle control with ADLs such as prolonged gait and stair negotiation. [] [] [] []   Pt will have improved SLS to >15s for increased ankle stability with ambulation on uneven surfaces such as gravel and grass. [] [] [] []   Pt will report <2/10 pain with home/recreational activities such as walking, stairs. [] [] [] []   Pt will be independent and compliant with comprehensive HEP to maintain progress achieved in PT. [] [] [] []   Pt to be independent in use of proper footwear and arch support for improved  tolerance to standing/walking.                                Plan  Continue with PT during break in care and resume at next visit; pt encouraged to continue to trial walking with monitoring symptoms and tolerance.    Treatment Last 4 Visits  Treatment Day: 6 6/20/2025 6/25/2025 6/27/2025 7/2/2025   LE Treatment   Therapeutic Exercise Nustep x5' L4     Gastroc and soleus stretches on incline 2x30\" ea  Heel and toe raises on floor x10 ea (feels pull in toe raises)  SLS 2x30\" B w/o Ue's; slight instability on L  Rockerboard AP and lateral sway x60\" each, static stance x30\"  Seated towel scoot x8 laps  Long sitting L ankle inversion/eversion w/yellow TB x20 each (issued TB for HEP)         Nustep x5' L4     Gastroc and soleus stretches on incline 2x30\" ea   Heel and toe raises from airex  2x10 ea     Seated towel scoot x5 laps   Long sitting L ankle inversion/eversion w/yellow TB x20 each -NP  Seated intrinsic strength - raising arch 5\"x10  Step ups (next time 4\")     Treadmill 1.4 mph x5' for warm up    Gastroc and soleus stretches on incline 3x30\" ea -feels L dorsal discomfort 1-2/10  Heelraises w/controlled lowering - x10 B     Long sitting L ankle inversion/eversion w/yellow TB x10 each, review of technic/position  Seated intrinsic strength - raising arch 5\"x10 -NP  Step ups fwd 6\" - Lx10, some pressure under heel, not bad  Step down w/L x10       Treadmill 1.5 mph x6' for warm up     Gastroc and soleus stretches on incline 2x30\" ea  Heelraise w/single leg lowering x4 reps - pt still reports increased pain, even with partial ROM on Left, grimacing  Heelraises w/controlled lowering bilateral- x10     Towel scoots x8 each way  Long sitting L ankle inversion/eversion w/yellow TB x10 each, review of technic/position   Seated intrinsic strength - raising arch 5\"x10 -NP   Step ups fwd  6\" - Lx15  Step ups lateral x10, step up and over x10  Step down 4\" w/L x10      Neuro Re-Education  SLS 2x30\" B w/o Ue's; cues for  neutral   Rockerboard AP and lateral sway x60\" each, static stance x60\"  Aeromat walking (next time) SLS x30\" floor; 2x30\" on airex (increased L dorsal foot pain)  Rockerboard AP 2x60\"  Aeromat walking - lateral and fwd/retro 4 laps ea     Rockerboard AP and lateral x60\" ea     Manual Therapy  STM x5' to L posterior tibialis, mild tenderness to distal m sandor and tendon   STM x5' to L posterior tibialis, mild tenderness to distal m sandor and tendon    Passive stretch of toe flexors and anterior tibialis; pt likely having muscle soreness from increased activity/ex's and encouraged to perform self stretches.   STM x5' to L posterior tibialis, mild tenderness to distal m sandor and tendon   Therapeutic Exercise Minutes 33 43 45 38   Neuro Re-Educ Minutes 10      Manual Therapy Minutes    5   Total Time Of Timed Procedures 43 43 45 43   Total Time Of Service-Based Procedures 0 0 0 0   Total Treatment Time 43 43 45 43   HEP Access Code: [email or web address]  URL: [email or web address]/  Date: 06/20/2025  Prepared by: Linda Pinzon    Exercises  - Gastroc Stretch on Wall  - 1 x daily - 7 x weekly - 3 reps - 30 hold  - Soleus Stretch on Wall  - 1 x daily - 7 x weekly - 3 reps - 30 hold  - Single Leg Stance  - 1 x daily - 7 x weekly - 2-3 reps - 30 hold  - Ankle Inversion Eversion Towel Slide  - 1 x daily - 7 x weekly - 3 sets - 10 reps  - Ankle Inversion with Resistance  - 1 x daily - 7 x weekly - 2 sets - 10 reps  - Ankle Eversion with Resistance  - 1 x daily - 7 x weekly - 2 sets - 10 reps   Access Code: [email or web address]  URL: [email or web address]/  Date: 07/02/2025  Prepared by: Linda Pinzon    Exercises  - Gastroc Stretch on Wall  - 1 x daily - 7 x weekly - 3 reps - 30 hold  - Soleus Stretch on Wall  - 1 x daily - 7 x weekly - 3 reps - 30 hold  - Single Leg Stance  - 1 x daily - 7 x weekly - 2-3 reps - 30 hold  - Ankle Inversion Eversion Towel Slide  - 1 x daily - 7 x weekly - 1 sets - 10 reps  - Ankle  Inversion with Resistance  - 1 x daily - 7 x weekly - 2 sets - 10 reps  - Ankle Eversion with Resistance  - 1 x daily - 7 x weekly - 2 sets - 10 reps  - Forward Step Down  - 1 x daily - 7 x weekly - 3 sets - 10 reps  - Lateral Step Down  - 1 x daily - 7 x weekly - 3 sets - 10 reps        HEP  Access Code: [email or web address]  URL: [email or web address]  Date: 07/02/2025  Prepared by: Linda Pinzon    Exercises  - Gastroc Stretch on Wall  - 1 x daily - 7 x weekly - 3 reps - 30 hold  - Soleus Stretch on Wall  - 1 x daily - 7 x weekly - 3 reps - 30 hold  - Single Leg Stance  - 1 x daily - 7 x weekly - 2-3 reps - 30 hold  - Ankle Inversion Eversion Towel Slide  - 1 x daily - 7 x weekly - 1 sets - 10 reps  - Ankle Inversion with Resistance  - 1 x daily - 7 x weekly - 2 sets - 10 reps  - Ankle Eversion with Resistance  - 1 x daily - 7 x weekly - 2 sets - 10 reps  - Forward Step Down  - 1 x daily - 7 x weekly - 3 sets - 10 reps  - Lateral Step Down  - 1 x daily - 7 x weekly - 3 sets - 10 reps    Charges   Therex 3

## 2025-07-22 ENCOUNTER — OFFICE VISIT (OUTPATIENT)
Dept: SURGERY | Facility: CLINIC | Age: 55
End: 2025-07-22
Payer: COMMERCIAL

## 2025-07-22 ENCOUNTER — OFFICE VISIT (OUTPATIENT)
Dept: PHYSICAL THERAPY | Age: 55
End: 2025-07-22
Attending: PODIATRIST
Payer: COMMERCIAL

## 2025-07-22 VITALS
BODY MASS INDEX: 33 KG/M2 | HEART RATE: 80 BPM | OXYGEN SATURATION: 97 % | RESPIRATION RATE: 16 BRPM | SYSTOLIC BLOOD PRESSURE: 140 MMHG | DIASTOLIC BLOOD PRESSURE: 81 MMHG | WEIGHT: 200 LBS

## 2025-07-22 DIAGNOSIS — C50.912 MALIGNANT NEOPLASM OF LEFT FEMALE BREAST, UNSPECIFIED ESTROGEN RECEPTOR STATUS, UNSPECIFIED SITE OF BREAST (HCC): Primary | ICD-10-CM

## 2025-07-22 PROCEDURE — 99213 OFFICE O/P EST LOW 20 MIN: CPT | Performed by: SURGERY

## 2025-07-22 PROCEDURE — 97112 NEUROMUSCULAR REEDUCATION: CPT

## 2025-07-22 PROCEDURE — 97110 THERAPEUTIC EXERCISES: CPT

## 2025-07-22 NOTE — PROGRESS NOTES
Patient: Anna Palacios (54 year old, female) Referring Provider:  Insurance:   Diagnosis: Posterior tibial tendinitis, left (M76.425) Britney Pineda  [employer]   Date of Surgery: No data recorded Next MD visit:  N/A   Precautions:    No data recorded Referral Information:    Date of Evaluation: Req: 0, Auth: 0, Exp:     06/12/25 POC Auth Visits:          Today's Date   7/22/2025    Subjective  Pt got new [employer] shoes over weekend and walked 1.5 miles last night; states they are feeling good.  pt reports she is performing stairs better at home, more reciprocal in descent; states she hasn't had much plantar foot pain since last PT treatment.       Pain: 0/10        Assessment  Pt is improving in tolerance to WB'ing/single leg ex's on firm and compiant surface, linsey in stepping activities for improved home mobility tolerance.  Pt had good tolerance to ex's today, with new supportive shoes. Pt will most likely be appropriate for DC fro PT after 2 remaining visits.    Goals (to be met in 10 visits)      Not Met Progress Toward Partially Met Met   Pt will demonstrate improved DF AROM to >= 10 degrees to promote proper foot clearance during gait and greater ease descending stairs without compensation. [] [] [] []   Pt will have increased ankle strength to 5/5 throughout for improved ankle control with ADLs such as prolonged gait and stair negotiation. [] [] [] []   Pt will have improved SLS to >15s for increased ankle stability with ambulation on uneven surfaces such as gravel and grass. [] [] [] []   Pt will report <2/10 pain with home/recreational activities such as walking, stairs. [] [] [] []   Pt will be independent and compliant with comprehensive HEP to maintain progress achieved in PT. [] [] [] []   Pt to be independent in use of proper footwear and arch support for improved tolerance to standing/walking.                                    Plan  continue to progress and upgrade HEP, reassess and work  toward DC    Treatment Last 4 Visits  Treatment Day: 7       6/25/2025 6/27/2025 7/2/2025 7/22/2025   LE Treatment   Therapeutic Exercise Nustep x5' L4     Gastroc and soleus stretches on incline 2x30\" ea   Heel and toe raises from airex  2x10 ea     Seated towel scoot x5 laps   Long sitting L ankle inversion/eversion w/yellow TB x20 each -NP  Seated intrinsic strength - raising arch 5\"x10  Step ups (next time 4\")     Treadmill 1.4 mph x5' for warm up    Gastroc and soleus stretches on incline 3x30\" ea -feels L dorsal discomfort 1-2/10  Heelraises w/controlled lowering - x10 B     Long sitting L ankle inversion/eversion w/yellow TB x10 each, review of technic/position  Seated intrinsic strength - raising arch 5\"x10 -NP  Step ups fwd 6\" - Lx10, some pressure under heel, not bad  Step down w/L x10       Treadmill 1.5 mph x6' for warm up     Gastroc and soleus stretches on incline 2x30\" ea  Heelraise w/single leg lowering x4 reps - pt still reports increased pain, even with partial ROM on Left, grimacing  Heelraises w/controlled lowering bilateral- x10     Towel scoots x8 each way  Long sitting L ankle inversion/eversion w/yellow TB x10 each, review of technic/position   Seated intrinsic strength - raising arch 5\"x10 -NP   Step ups fwd  6\" - Lx15  Step ups lateral x10, step up and over x10  Step down 4\" w/L x10    Treadmill 1.5 mph x6' for warm up      Gastroc and soleus stretches on incline 2x30\" ea  Heelraise w/single leg lowering x7 reps - improved     Shuttle BLP w/B, Y straps 3x10    Step ups fwd  6\" 2x10  Step ups lateral 2x10  Step down 6\" w/L x10         Neuro Re-Education SLS 2x30\" B w/o Ue's; cues for neutral   Rockerboard AP and lateral sway x60\" each, static stance x60\"  Aeromat walking (next time) SLS x30\" floor; 2x30\" on airex (increased L dorsal foot pain)  Rockerboard AP 2x60\"  Aeromat walking - lateral and fwd/retro 4 laps ea     Rockerboard AP and lateral x60\" ea   Rockerboard AP and lateral  x60  Alternating BOSU lunge steps x10 ea  Trampoline walk/light jog 2x1'      Manual Therapy STM x5' to L posterior tibialis, mild tenderness to distal m sandor and tendon   STM x5' to L posterior tibialis, mild tenderness to distal m sandor and tendon    Passive stretch of toe flexors and anterior tibialis; pt likely having muscle soreness from increased activity/ex's and encouraged to perform self stretches.   STM x5' to L posterior tibialis, mild tenderness to distal m sandor and tendon    Therapeutic Exercise Minutes 43 45 38 27   Neuro Re-Educ Minutes    15   Manual Therapy Minutes   5    Total Time Of Timed Procedures 43 45 43 42   Total Time Of Service-Based Procedures 0 0 0 0   Total Treatment Time 43 45 43 42   HEP   Access Code: [email or web address]  URL: [email or web address]  Date: 07/02/2025  Prepared by: Linda Pinzon    Exercises  - Gastroc Stretch on Wall  - 1 x daily - 7 x weekly - 3 reps - 30 hold  - Soleus Stretch on Wall  - 1 x daily - 7 x weekly - 3 reps - 30 hold  - Single Leg Stance  - 1 x daily - 7 x weekly - 2-3 reps - 30 hold  - Ankle Inversion Eversion Towel Slide  - 1 x daily - 7 x weekly - 1 sets - 10 reps  - Ankle Inversion with Resistance  - 1 x daily - 7 x weekly - 2 sets - 10 reps  - Ankle Eversion with Resistance  - 1 x daily - 7 x weekly - 2 sets - 10 reps  - Forward Step Down  - 1 x daily - 7 x weekly - 3 sets - 10 reps  - Lateral Step Down  - 1 x daily - 7 x weekly - 3 sets - 10 reps Review of HEP for frequency/duration        HEP  Review of HEP for frequency/duration    Charges   Therex 2  NM 1

## 2025-07-25 ENCOUNTER — APPOINTMENT (OUTPATIENT)
Dept: PHYSICAL THERAPY | Age: 55
End: 2025-07-25
Attending: PODIATRIST
Payer: COMMERCIAL

## 2025-07-28 ENCOUNTER — OFFICE VISIT (OUTPATIENT)
Dept: PHYSICAL THERAPY | Age: 55
End: 2025-07-28
Attending: PODIATRIST
Payer: COMMERCIAL

## 2025-07-28 PROBLEM — C50.912 MALIGNANT NEOPLASM OF LEFT FEMALE BREAST (HCC): Status: ACTIVE | Noted: 2025-07-28

## 2025-07-28 PROCEDURE — 97112 NEUROMUSCULAR REEDUCATION: CPT

## 2025-07-28 PROCEDURE — 97110 THERAPEUTIC EXERCISES: CPT

## 2025-07-28 NOTE — PROGRESS NOTES
Breast Surgery Surveillance Visit    Diagnosis: Invasive ductal carcinoma, left breast, status post lumpectomy with sentinel lymph node biopsy on 5/16/2024.    Stage: T1cN0 (I+)Mx    Disease Status:  Surgical treatment complete, radiation completed, chemotherapy completed, letrozole stopped due to joint pain.    History of Present Illness:   Ms. Anna Palacios is a 54 year old woman who presents with imaging detected left breast cancer.  The patient denies any palpable masses, nipple discharge, skin changes or axillary symptoms.  She does not have any known family history of breast cancer.  She has no personal prior history of breast disease or biopsies.  She presented for screening mammogram on March 7, 2023 and was found to have scattered densities with no suspicious findings.  Her screening mammogram in March 2024 demonstrated nodular asymmetries in the left breast for which additional imaging was recommended.  A left diagnostic evaluation on April 2, 2024 confirmed a 1.5 cm nodule at 3:00, 6 cm from the nipple for which biopsy was recommended.  She was noted to otherwise have a benign and incidental cyst and was noted to have normal-appearing axillary lymph nodes.  She had the left breast ultrasound-guided biopsy on April 12, 2024 and was found to have invasive ductal carcinoma with prominent lymphocytic response that was grade 3 measuring up to 7 mm, ER 15%, SD 15%, HER2/mark negative with a Ki-67 greater than 90%. She underwent lumpectomy with sentinel lymph node biopsy, which occurred without complication. She healed well since surgery.  She did start on letrozole although this was discontinued due to joint pain.  She had a bilateral diagnostic surveillance on May 27, 2025 that showed no suspicious findings.She is here today for evaluation and recommendations for further therapy.        Past Medical History:    Anesthesia complication    gets shakey    Breast cancer (HCC)    IDC    DCIS (ductal carcinoma in  situ) of breast    DUB (dysfunctional uterine bleeding)    Headache disorder    sometimes    High cholesterol    Sleep disturbance    some insomnia lately due to menopause    Visual impairment    reading glasses    Wears glasses    only for reading    Weight gain    through menopause       Past Surgical History:   Procedure Laterality Date    Chemotherapy      Colonoscopy  24    Colonoscopy      D & c  2022    Dilation/curettage,diagnostic      Endometrial ablation      Endometrial biopsy - jar(s): 2  2022    Hysteroscopy  2022    Lumpectomy left Left 2024    IDC    Radiation left      [place] teeth removed         Gynecological History:  Pt is a   Pt was 28 years old at time of first pregnancy.    She has cumulative breastfeeding history of 10 months.  She achieved menarche at age 14 and LMP age 52, pt underwent ablation   She denies any history of hormone replacement therapy   She has history of oral contraceptive use for 1 years, last in .  She denies infertility treatment to achieve pregnancy.    Medications:     Calcium Carbonate (CALCIUM 600 OR) Take 600 mg by mouth in the morning.      Multiple Vitamins-Minerals (MULTI-VITAMIN/MINERALS) Oral Tab Take 1 tablet by mouth in the morning.      Cholecalciferol (VITAMIN D) 50 MCG (2000 UT) Oral Cap Take 1 capsule (2,000 Units total) by mouth in the morning.      Nystatin 074227 UNIT/GM External Powder Apply 1 Application topically in the morning.      hydrocortisone 2.5 % External Cream Use on AA QD-BID prn 30 g 2    ketoconazole 2 % External Cream Apply to AA QD-BID prn. 60 g 2       Allergies:    Allergies   Allergen Reactions    Penicillins ITCHING     As a child       Family History:   Family History   Problem Relation Age of Onset    Breast Cancer Self 53        IDC    Hypertension Mother     Cancer Father 74        prostate with lung mets    Prostate Cancer Father     Other (lung cancer) Paternal Uncle     Other (esophagus  cancer) Paternal Cousin        She is not of Ashkenazi Rastafari ancestry.    Social History:  History   Alcohol Use Never       History   Smoking Status    Never   Smokeless Tobacco    Never     Ms. Anna Palacios is  with 2 children. She has 2 siblings. She is currently Homemaker      Review of Systems:  General:   The patient denies, fever, chills, +night sweats, +fatigue, generalized weakness, change in appetite or weight loss.    HEENT:     The patient denies eye irritation, cataracts, redness, glaucoma, yellowing of the eyes, change in vision, color blindness, or +wearing contacts/glasses. The patient denies hearing loss, ringing in the ears, ear drainage, earaches, nasal congestion, nose bleeds, snoring, pain in mouth/throat, hoarseness, change in voice, facial trauma.    Respiratory:  The patient denies chronic cough, phlegm, hemoptysis, pleurisy/chest pain, pneumonia, asthma, wheezing, difficulty in breathing with exertion, emphysema, chronic bronchitis, shortness of breath or abnormal sound when breathing.     Cardiovascular:  There is no history of chest pain, chest pressure/discomfort, palpitations, irregular heartbeat, fainting or near-fainting, difficulty breathing when lying flat, SOB/Coughing at night, swelling of the legs or chest pain while walking.    Breasts:  See history of present illness    Gastrointestinal:     There is no history of difficulty or pain with swallowing, reflux symptoms, vomiting, dark or bloody stools, constipation, yellowing of the skin, indigestion, nausea, change in bowel habits, diarrhea, abdominal pain or vomiting blood.     Genitourinary:  The patient denies frequent urination, needing to get up at night to urinate, urinary hesitancy or retaining urine, painful urination, urinary incontinence, decreased urine stream, blood in the urine or vaginal/penile discharge.    Skin:    The patient denies rash, itching, skin lesions, dry skin, change in skin color or change in  moles.     Hematologic/Lymphatic:  The patient denies easily bruising or bleeding or persistent swollen glands or lymph nodes.     Musculoskeletal:  The patient denies muscle aches/pain, joint pain, stiff joints, neck pain, back pain or bone pain.    Neuropsychiatric:  There is no history of migraines or severe headaches, seizure/epilepsy, speech problems, coordination problems, trembling/tremors, fainting/black outs, dizziness, memory problems, loss of sensation/numbness, problems walking, weakness, tingling or burning in hands/feet. There is no history of abusive relationship, bipolar disorder, +sleep disturbance, anxiety, depression or feeling of despair.    Endocrine:    There is no history of poor/slow wound healing, weight loss/gain, fertility or hormone problems, cold intolerance, thyroid disease.     Allergic/Immunologic:  There is no history of hives, hay fever, angioedema or anaphylaxis.    /81 (BP Location: Left arm, Patient Position: Sitting, Cuff Size: adult)   Pulse 80   Resp 16   Wt 90.7 kg (200 lb)   SpO2 97%   BMI 33.12 kg/m²     Physical Exam:  The patient is an alert, oriented, well-nourished and  well-developed woman who appears her stated age. Her speech patterns and movements are normal. Her affect is appropriate.    HEENT: The head is normocephalic. The neck is supple. The thyroid is not enlarged and is without palpable masses/nodules. There are no palpable masses. The trachea is in the midline. Conjunctiva are clear, non-icteric.    Chest: The chest expands symmetrically. The lungs are clear to auscultation.    Heart: The rhythm is regular.  There are no murmurs, rubs, gallops or thrills.    Breasts:  Her breasts are symmetrical with a cup size 38B.  Right breast: The skin, nipple ,and areola appear normal. There is no skin dimpling with movement of the pectoralis. There is no nipple retraction. No nipple discharge can be elicited. The parenchyma is mildly nodular. There are no  dominant masses in the breast. The axillary tail is normal.  Left breast:   The skin, nipple, and areola appear normal. There is no skin dimpling with movement of the pectoralis. There is no nipple retraction. No nipple discharge can be elicited. The parenchyma is mildly nodular. There are no dominant masses in the breast. The axillary tail is normal.  There is a well-healed incision with no signs of new or recurrent concerns.  Abdomen:  The abdomen is soft, flat and non tender. The liver is not enlarged. There are no palpable masses.    Lymph Nodes:  The supraclavicular, axillary and cervical regions are free of significant lymphadenopathy.    Back: There is no vertebral column tenderness.    Skin: The skin appears normal. There are no suspicious appearing rashes or lesions.    Extremities: The extremities are without deformity, cyanosis or edema.    Impression:   Ms. Anna Palacios is a 54 year old woman presents with left breast cancer, s/p lumpectomy with SLNB.    Recommendations:   I had a discussion with the Patient regarding her breast exam.  She is healing well since surgery with no signs of recurrent disease.  I reviewed the recent imaging which is concordant with her clinical exam. I  reviewed her pathology.  Pathology demonstrated a 1.8 cm tumor with a satellite nodule also measuring 6 mm, clear margins and 1 out of 3 lymph nodes with isolated tumor cells.  No further surgery is recommended this time.  Due to radiation and continues to follow with medical oncology status post completion of her chemotherapy.  She does report that she is currently not taking her letrozole due to joint pain and I have advised she discuss this further with medical oncology.  Will plan for left diagnostic evaluation in November 2025 with a clinical exam to follow for ongoing surveillance.  I encouraged her to continue monitoring her ROM and strength and explained that a referral to physical therapy may be warranted in the  future if she identifies any limitations or restrictions. She was given ample opportunity for questions and those questions were answered to her satisfaction. She was encouraged to contact the office with any questions or concerns prior to her next scheduled appointment.       This note was created by [employer] voice recognition. Errors in content may be related to improper recognition by the system; efforts to review and correct have been done but errors may still exist. Please be advised the primary purpose of this note is for me to communicate medical care. Standard sentence structure is not always used. Medical terminology and medical abbreviations may be used. There may be grammatical, typographical, and automated fill ins that may have errors missed in proofreading.    This encounter lasted a total of 25 minutes, more than 50% of which was dedicated to the discussion of management options.

## 2025-07-28 NOTE — PROGRESS NOTES
Patient: Anna Palacios (54 year old, female) Referring Provider:  Insurance:   Diagnosis: Posterior tibial tendinitis, left (M76.619) Britney Pineda  [facility]   Date of Surgery: No data recorded Next MD visit:  N/A   Precautions:    No data recorded Referral Information:    Date of Evaluation: Req: 0, Auth: 0, Exp:     06/12/25 POC Auth Visits:          Today's Date   7/28/2025    Subjective  Pt reprorts practicing step downs at home, putting more pressure on L foot; no c/o plantar foot pain since last visit.       Pain: 0/10            Assessment  Pt is doing well, able to tolerate increased dynamic balance and strength ex's on firm/compliant surface; however, pt hesitant to progress to single leg heelraise due to feeling pressure and afraid pain will return.  Pt encouraged to progress as able to work toward normalized functional strength.    Goals (to be met in 10 visits)      Not Met Progress Toward Partially Met Met   Pt will demonstrate improved DF AROM to >= 10 degrees to promote proper foot clearance during gait and greater ease descending stairs without compensation. [] [] [] []   Pt will have increased ankle strength to 5/5 throughout for improved ankle control with ADLs such as prolonged gait and stair negotiation. [] [] [] []   Pt will have improved SLS to >15s for increased ankle stability with ambulation on uneven surfaces such as gravel and grass. [] [] [] []   Pt will report <2/10 pain with home/recreational activities such as walking, stairs. [] [] [] []   Pt will be independent and compliant with comprehensive HEP to maintain progress achieved in PT. [] [] [] []   Pt to be independent in use of proper footwear and arch support for improved tolerance to standing/walking.                                        Plan  reassess and DC at next visit    Treatment Last 4 Visits  Treatment Day: 8       6/27/2025 7/2/2025 7/22/2025 7/28/2025   LE Treatment   Therapeutic Exercise Treadmill 1.4  mph x5' for warm up    Gastroc and soleus stretches on incline 3x30\" ea -feels L dorsal discomfort 1-2/10  Heelraises w/controlled lowering - x10 B     Long sitting L ankle inversion/eversion w/yellow TB x10 each, review of technic/position  Seated intrinsic strength - raising arch 5\"x10 -NP  Step ups fwd 6\" - Lx10, some pressure under heel, not bad  Step down w/L x10       Treadmill 1.5 mph x6' for warm up     Gastroc and soleus stretches on incline 2x30\" ea  Heelraise w/single leg lowering x4 reps - pt still reports increased pain, even with partial ROM on Left, grimacing  Heelraises w/controlled lowering bilateral- x10     Towel scoots x8 each way  Long sitting L ankle inversion/eversion w/yellow TB x10 each, review of technic/position   Seated intrinsic strength - raising arch 5\"x10 -NP   Step ups fwd  6\" - Lx15  Step ups lateral x10, step up and over x10  Step down 4\" w/L x10    Treadmill 1.5 mph x6' for warm up      Gastroc and soleus stretches on incline 2x30\" ea  Heelraise w/single leg lowering x7 reps - improved     Shuttle BLP w/B, Y straps 3x10    Step ups fwd  6\" 2x10  Step ups lateral 2x10  Step down 6\" w/L x10       Treadmill 1.5 mph x5' for warm up      Gastroc and soleus stretches on incline 2x30\" ea   Heelraise w/single leg lowering x7 reps      Shuttle BLP w/B, G straps 2x10   Shuttle SLP w/B x10  Shuttle alternate prancing w/G x20    Single leg HR x7 ea leg - pt feels pressure, not pain, hesitant to rely on single leg; encouraged to practice as able to continue strengthening for functional progression     Neuro Re-Education SLS x30\" floor; 2x30\" on airex (increased L dorsal foot pain)  Rockerboard AP 2x60\"  Aeromat walking - lateral and fwd/retro 4 laps ea     Rockerboard AP and lateral x60\" ea   Rockerboard AP and lateral x60  Alternating BOSU lunge steps x10 ea  Trampoline walk/light jog 2x1'    Rockerboard AP and lateral x10 each  Alternating BOSU lunge steps x10 ea  Trampoline walk/light jog  2'  SLS w/opp hip 3-way kick x10 ea        Manual Therapy   STM x5' to L posterior tibialis, mild tenderness to distal m sandor and tendon    Passive stretch of toe flexors and anterior tibialis; pt likely having muscle soreness from increased activity/ex's and encouraged to perform self stretches.   STM x5' to L posterior tibialis, mild tenderness to distal m sandor and tendon     Therapeutic Exercise Minutes 45 38 27 28   Neuro Re-Educ Minutes   15 12   Manual Therapy Minutes  5     Total Time Of Timed Procedures 45 43 42 40   Total Time Of Service-Based Procedures 0 0 0 0   Total Treatment Time 45 43 42 40   HEP  Access Code: [email or web address]  URL: [email or web address]  Date: 07/02/2025  Prepared by: Linda Pinzon    Exercises  - Gastroc Stretch on Wall  - 1 x daily - 7 x weekly - 3 reps - 30 hold  - Soleus Stretch on Wall  - 1 x daily - 7 x weekly - 3 reps - 30 hold  - Single Leg Stance  - 1 x daily - 7 x weekly - 2-3 reps - 30 hold  - Ankle Inversion Eversion Towel Slide  - 1 x daily - 7 x weekly - 1 sets - 10 reps  - Ankle Inversion with Resistance  - 1 x daily - 7 x weekly - 2 sets - 10 reps  - Ankle Eversion with Resistance  - 1 x daily - 7 x weekly - 2 sets - 10 reps  - Forward Step Down  - 1 x daily - 7 x weekly - 3 sets - 10 reps  - Lateral Step Down  - 1 x daily - 7 x weekly - 3 sets - 10 reps Review of HEP for frequency/duration         HEP  Review of HEP for frequency/duration    Charges   Therex 2  NM 1

## 2025-07-30 ENCOUNTER — OFFICE VISIT (OUTPATIENT)
Dept: PHYSICAL THERAPY | Age: 55
End: 2025-07-30
Attending: PODIATRIST
Payer: COMMERCIAL

## 2025-07-30 PROCEDURE — 97110 THERAPEUTIC EXERCISES: CPT

## (undated) DIAGNOSIS — N93.8 DYSFUNCTIONAL UTERINE BLEEDING: Primary | ICD-10-CM

## (undated) DIAGNOSIS — Z23 NEED FOR SHINGLES VACCINE: Primary | ICD-10-CM

## (undated) DEVICE — SLEEVE COMPR MD KNEE LEN SGL USE KENDALL SCD

## (undated) DEVICE — GLOVE SUR 6.5 SENSICARE PI PIP CRM PWD F

## (undated) DEVICE — DEV REMOVAL TRUCLEAR DNS PLUS

## (undated) DEVICE — SYRINGE MED 5ML STD CLR PLAS LL TIP N CTRL

## (undated) DEVICE — DRAPE,TAPE STRIPS,STERILE: Brand: MEDLINE

## (undated) DEVICE — SOLUTION IRRIG 1000ML 0.9% NACL USP BTL

## (undated) DEVICE — MEDI-VAC NON-CONDUCTIVE SUCTION TUBING: Brand: CARDINAL HEALTH

## (undated) DEVICE — CLIP LIG M BLU TI HRT SHP WIRE HORZ

## (undated) DEVICE — STERILE POLYISOPRENE POWDER-FREE SURGICAL GLOVES: Brand: PROTEXIS

## (undated) DEVICE — DEV REMOVAL TRUCLEAR SFT PLUS

## (undated) DEVICE — SLEEVE KENDALL SCD EXPRESS MED

## (undated) DEVICE — CLEAR MONOFILAMENT (POLYDIOXANONE), ABSORBABLE SURGICAL SUTURE: Brand: PDS

## (undated) DEVICE — SPECIMEN SOCK - STANDARD: Brand: MEDI-VAC

## (undated) DEVICE — SUT MCRYL 4-0 27IN ABSRB UD 19MM PS-2 3/8

## (undated) DEVICE — BREAST-HERNIA-PORT CDS-LF: Brand: MEDLINE INDUSTRIES, INC.

## (undated) DEVICE — SOLUTION  .9 3000ML

## (undated) DEVICE — DRAPE PACK CHEST

## (undated) DEVICE — GYN CDS: Brand: MEDLINE INDUSTRIES, INC.

## (undated) DEVICE — INFLOWHYSTER S&N

## (undated) DEVICE — 2000CC GUARDIAN II: Brand: GUARDIAN

## (undated) DEVICE — PROVE COVER: Brand: UNBRANDED

## (undated) DEVICE — Device

## (undated) DEVICE — DEV REMOVAL TRUCLEAR SFT MINI

## (undated) DEVICE — OUTFLOW HYSTER S&N

## (undated) DEVICE — #15 STERILE STAINLESS BLADE: Brand: STERILE STAINLESS BLADES

## (undated) DEVICE — SOLUTION  .9 1000ML BTL

## (undated) DEVICE — SOL  .9 1000ML BTL

## (undated) DEVICE — CLIP SUR SM TI HRT SHP WIRE HORZ LIG SYS

## (undated) DEVICE — SEAL TRUCLEAR  HYSTERSCOP

## (undated) DEVICE — SOL  .9 3000ML

## (undated) DEVICE — ELECTRODE ES 2.75IN PTFE BLDE MOD E-Z CLN

## (undated) DEVICE — SCD SLEEVE KNEE HI BLEND

## (undated) DEVICE — STANDARD HYPODERMIC NEEDLE,POLYPROPYLENE HUB: Brand: MONOJECT

## (undated) DEVICE — SUT PERMA- 2-0 18IN FS NABSRB BLK 26MM 3/8

## (undated) DEVICE — UNDERPAD INCONT 23X36IN STD BLU BACKSHEET

## (undated) DEVICE — ANTIBACTERIAL UNDYED BRAIDED (POLYGLACTIN 910), SYNTHETIC ABSORBABLE SUTURE: Brand: COATED VICRYL

## (undated) DEVICE — COVER LT HNDL RIG FOR SUR CAM DISP

## (undated) DEVICE — 3M™ STERI-DRAPE™ INSTRUMENT POUCH 1018: Brand: STERI-DRAPE™

## (undated) NOTE — MR AVS SNAPSHOT
After Visit Summary   11/19/2021    Joe Solis   MRN: [id number]           Visit Information     Date & Time  11/19/2021 11:45 AM Provider  Lex Pastor MD [facility] [address] [address] [address] [facility].  Phone  [phone] midline   [618.01. ICD-9-CM]             Follow-up    Return in about 1 year (around 11/19/2022) for [address] with Misty Rizzo and annual exam to follow.      We Ordered the Following     Normal Orders This Visit    HPV HIGH RISK , THIN PREP COLLECTION [[id number] CUSTOM] hassle-free using a credit, debit, or health savings card. Not active on [employer]? Ask us how to get signed up today! If you receive a survey from [employer], please take a few minutes to complete it and provide feedback.  We strive to deliver the b

## (undated) NOTE — Clinical Note
Just started letrozole, 3 days, lower GI issues, will see with more time. Bone dens this week, sees you in April. Encouraged exercise, psychosocial support, thx

## (undated) NOTE — Clinical Note
I did her ed today and she was very overwhelmed and anxious. She kept repeating \"its just happening so quickly\" (diagnosed in April). She declined to sign the consent today and said she needed time to \"process\". Struggling to understand why we're recommending chemo if all the cancer is gone. Wanted to give you the update. Consent is in, just needs to be signed.